# Patient Record
Sex: FEMALE | Race: WHITE | NOT HISPANIC OR LATINO | Employment: OTHER | URBAN - METROPOLITAN AREA
[De-identification: names, ages, dates, MRNs, and addresses within clinical notes are randomized per-mention and may not be internally consistent; named-entity substitution may affect disease eponyms.]

---

## 2017-01-31 ENCOUNTER — OFFICE VISIT (OUTPATIENT)
Dept: MEDICAL GROUP | Facility: PHYSICIAN GROUP | Age: 82
End: 2017-01-31
Payer: COMMERCIAL

## 2017-01-31 VITALS
SYSTOLIC BLOOD PRESSURE: 122 MMHG | RESPIRATION RATE: 16 BRPM | DIASTOLIC BLOOD PRESSURE: 80 MMHG | OXYGEN SATURATION: 91 % | WEIGHT: 110 LBS | HEIGHT: 62 IN | BODY MASS INDEX: 20.24 KG/M2 | TEMPERATURE: 98.1 F | HEART RATE: 61 BPM

## 2017-01-31 DIAGNOSIS — F41.0 PANIC ATTACKS: ICD-10-CM

## 2017-01-31 DIAGNOSIS — M62.830 MUSCLE SPASM OF BACK: ICD-10-CM

## 2017-01-31 DIAGNOSIS — F41.9 ANXIETY: ICD-10-CM

## 2017-01-31 DIAGNOSIS — M85.80 OSTEOPENIA: ICD-10-CM

## 2017-01-31 DIAGNOSIS — I10 ESSENTIAL HYPERTENSION, BENIGN: ICD-10-CM

## 2017-01-31 PROCEDURE — 3288F FALL RISK ASSESSMENT DOCD: CPT | Performed by: FAMILY MEDICINE

## 2017-01-31 PROCEDURE — 4040F PNEUMOC VAC/ADMIN/RCVD: CPT | Performed by: FAMILY MEDICINE

## 2017-01-31 PROCEDURE — 0518F FALL PLAN OF CARE DOCD: CPT | Mod: 8P | Performed by: FAMILY MEDICINE

## 2017-01-31 PROCEDURE — G8482 FLU IMMUNIZE ORDER/ADMIN: HCPCS | Performed by: FAMILY MEDICINE

## 2017-01-31 PROCEDURE — G8419 CALC BMI OUT NRM PARAM NOF/U: HCPCS | Performed by: FAMILY MEDICINE

## 2017-01-31 PROCEDURE — 1036F TOBACCO NON-USER: CPT | Performed by: FAMILY MEDICINE

## 2017-01-31 PROCEDURE — G8432 DEP SCR NOT DOC, RNG: HCPCS | Performed by: FAMILY MEDICINE

## 2017-01-31 PROCEDURE — 99214 OFFICE O/P EST MOD 30 MIN: CPT | Performed by: FAMILY MEDICINE

## 2017-01-31 PROCEDURE — 1100F PTFALLS ASSESS-DOCD GE2>/YR: CPT | Performed by: FAMILY MEDICINE

## 2017-01-31 RX ORDER — PAROXETINE HYDROCHLORIDE 20 MG/1
TABLET, FILM COATED ORAL
Qty: 90 TAB | Refills: 3 | Status: SHIPPED | OUTPATIENT
Start: 2017-01-31 | End: 2017-08-23 | Stop reason: SDUPTHER

## 2017-01-31 RX ORDER — ALENDRONATE SODIUM 70 MG/1
TABLET ORAL
Qty: 12 TAB | Refills: 3 | Status: SHIPPED | OUTPATIENT
Start: 2017-01-31 | End: 2017-12-19 | Stop reason: SDUPTHER

## 2017-01-31 NOTE — MR AVS SNAPSHOT
"        Marifer Eldridge   2017 10:40 AM   Office Visit   MRN: [id number]    Department:  [facility]   Dept Phone:  [phone]    Description:  Female : 1931   Provider:  Angelito Thomas M.D.           Reason for Visit     Hypertension 3 month follow up      Allergies as of 2017     Allergen Noted Reactions    Chocolate 2014       Diarrhea    Metrogel 2012       Headaches    Tessalon [Benzonatate] 2009       Headache      Zoloft 2009       headache      You were diagnosed with     Osteopenia   [867629]       Panic attacks   [258466]       Anxiety   [037508]       Essential hypertension, benign   [401.1.ICD-9-CM]       Muscle spasm of back   [648398]         Vital Signs     Blood Pressure Pulse Temperature Respirations Height Weight    122/80 mmHg 61 36.7 °C (98.1 °F) 16 1.575 m (5' 2\") 49.896 kg (110 lb)    Body Mass Index Oxygen Saturation Last Menstrual Period Smoking Status          20.11 kg/m2 91% 1986 Never Smoker         Basic Information     Date Of Birth Sex Race Ethnicity Preferred Language    1931 Female White Non- English      Problem List              ICD-10-CM Priority Class Noted - Resolved    Anxiety F41.9   2009 - Present    Essential hypertension, benign I10   2009 - Present    Diarrhea following gastrointestinal surgery R19.7, Z98.890   2009 - Present    Reactive airway disease J45.909   2009 - Present    Panic attacks F41.0   2009 - Present    Systolic murmur R01.1   2009 - Present    Hemorrhoid K64.9   2009 - Present    Vitamin D deficiency disease E55.9   2010 - Present    Osteopenia M85.80   2011 - Present    Acne rosacea L71.9   2012 - Present    Personal history of ovarian cancer Z85.43   2012 - Present    Peripheral neuropathy (CMS-HCC) G62.9   3/12/2013 - Present    Neural foraminal stenosis of cervical spine M99.81   4/10/2014 - Present    Overactive bladder N32.81  "  4/7/2016 - Present    Meningioma (HCC) D32.9   4/29/2016 - Present    Muscle spasm of back M62.830   10/25/2016 - Present      Health Maintenance        Date Due Completion Dates    MAMMOGRAM 11/18/2014 11/18/2013, 11/16/2012, 4/12/2011, 3/30/2010, 3/30/2010, 3/10/2009, 3/10/2009, 2/12/2008, 2/12/2008    BONE DENSITY 12/5/2018 12/5/2013, 11/23/2011    COLONOSCOPY 3/30/2020 3/30/2010 (Done)    Override on 3/30/2010: Done (normal)    IMM DTaP/Tdap/Td Vaccine (2 - Td) 7/17/2022 7/17/2012            Current Immunizations     13-VALENT PCV PREVNAR 10/25/2016    Influenza TIV (IM) 11/1/2012, 10/31/2011, 10/5/2010    Influenza Vaccine 10/1/2007    Influenza Vaccine Adult HD 10/25/2016    Influenza Vaccine Quad Inj (Pf) 10/6/2014    Influenza Vaccine Quad Inj (Preserved) 10/22/2015    Pneumococcal polysaccharide vaccine (PPSV-23) 12/13/2011    SHINGLES VACCINE 2/25/2010    Tdap Vaccine 7/17/2012    Tetanus Vaccine 9/1/2002      Below and/or attached are the medications your provider expects you to take. Review all of your home medications and newly ordered medications with your provider and/or pharmacist. Follow medication instructions as directed by your provider and/or pharmacist. Please keep your medication list with you and share with your provider. Update the information when medications are discontinued, doses are changed, or new medications (including over-the-counter products) are added; and carry medication information at all times in the event of emergency situations     Allergies:  CHOCOLATE - (reactions not documented)     METROGEL - (reactions not documented)     TESSALON - (reactions not documented)     ZOLOFT - (reactions not documented)               Medications  Valid as of: January 31, 2017 - 11:56 AM    Generic Name Brand Name Tablet Size Instructions for use    Alendronate Sodium (Tab) FOSAMAX 70 MG TAKE 1 TABLET EVERY 7 DAYS        Alendronate Sodium (Tab) FOSAMAX 70 MG TAKE 1 TABLET EVERY 7 DAYS           Aspirin (Tablet Delayed Response) ECOTRIN 325 MG Take 1 Tab by mouth every day.        B Complex-C   Take  by mouth.        Cholecalciferol (Tab) cholecalciferol 1000 UNIT Take 1 Tab by mouth every day.        Diphenoxylate-Atropine (Tab) LOMOTIL 2.5-0.025 MG TAKE  (1)  TABLET  FOUR TIMES DAILY AS NEEDED FOR DIARRHEA.        Hydrocodone-Acetaminophen (Tab) NORCO 5-325 MG Take 1-2 Tabs by mouth every four hours as needed.        Latanoprost (Solution) XALATAN 0.005 % Place 1 Drop in right eye every evening.        Metaxalone (Tab) Metaxalone 400 MG Take 400-800 mg by mouth 3 times a day as needed for Muscle Spasms.        Metaxalone (Tab) SKELAXIN 800 MG TAKE 1/2-1 TABLET BY MOUTH 3 TIMES A DAY AS NEEDED FOR MUSCLE SPASM.        Oxybutynin Chloride (Tab) DITROPAN 5 MG TAKE 1 TABLET BY MOUTH 4 TIMES DAILY AS NEEDED.        Oxybutynin Chloride (Tab) DITROPAN 5 MG TAKE 1 TABLET BY MOUTH 4 TIMES DAILY AS NEEDED.        PARoxetine HCl (Tab) PAXIL 20 MG TAKE 1 TABLET DAILY WITH   FOOD        Propranolol HCl (Tab) INDERAL 10 MG TAKE (1) TABLET BY MOUTH TWICE DAILY.        Triamterene-HCTZ (Cap) MAXZIDE-25/DYAZIDE 37.5-25 MG TAKE 1 CAPSULE EVERY       MORNING        .                 Medicines prescribed today were sent to:     [facility] [address] [address]    Phone: [phone] Fax: [phone]    Open 24 Hours?: No    [facility] [place], [place] - [address] AT PORTAL TO REGISTERED [facility]    [address] [facility] [address]    Phone: [phone] Fax: [phone]    Open 24 Hours?: No    [id number] - [place] - [address]    [address] [place] [address]    Phone: [phone] Fax: [phone]    Open 24 Hours?: No      Medication refill instructions:       If your prescription bottle indicates you have medication refills left, it is not necessary to call your provider’s office.  Please contact your pharmacy and they will refill your medication.    If your prescription bottle indicates you do not have any refills left, you may request refills at any time through one of the following ways: The online [employer] system (except Urgent Care), by calling your provider’s office, or by asking your pharmacy to contact your provider’s office with a refill request. Medication refills are processed only during regular business hours and may not be available until the next business day. Your provider may request additional information or to have a follow-up visit with you prior to refilling your medication.   *Please Note: Medication refills are assigned a new Rx number when refilled electronically. Your pharmacy may indicate that no refills were authorized even though a new prescription for the same medication is available at the pharmacy. Please request the medicine by name with the pharmacy before contacting your provider for a refill.           [employer] Access Code: [email or web address]  Expires: 3/2/2017 11:56 AM    [employer]  A secure, online tool to manage your health information     [employer]’s [employer]® is a secure, online tool that connects you to your personalized health information from the privacy of your home -- day or night - making it very easy for you to manage your healthcare. Once the activation process is completed, you can even access your medical information using the [employer] denny, which is available for free in the Apple Denny store or Google Play store.     [employer] provides the following levels of access (as shown below):   My Chart Features   [facility] Doctor [facility]  Specialists [facility]  Urgent  Care Non-[facility]  Primary Care  Doctor   Email your healthcare team securely and privately 24/7 X X X    Manage appointments: schedule your next appointment; view details of past/upcoming appointments X      Request prescription refills. X      View recent personal medical records,  including lab and immunizations X X X X   View health record, including health history, allergies, medications X X X X   Read reports about your outpatient visits, procedures, consult and ER notes X X X X   See your discharge summary, which is a recap of your hospital and/or ER visit that includes your diagnosis, lab results, and care plan. X X       How to register for [employer]:  1. Go to  [email or web address].  2. Click on the Sign Up Now box, which takes you to the New Member Sign Up page. You will need to provide the following information:  a. Enter your [employer] Access Code exactly as it appears at the top of this page. (You will not need to use this code after you’ve completed the sign-up process. If you do not sign up before the expiration date, you must request a new code.)   b. Enter your date of birth.   c. Enter your home email address.   d. Click Submit, and follow the next screen’s instructions.  3. Create a [employer] ID. This will be your [employer] login ID and cannot be changed, so think of one that is secure and easy to remember.  4. Create a [employer] password. You can change your password at any time.  5. Enter your Password Reset Question and Answer. This can be used at a later time if you forget your password.   6. Enter your e-mail address. This allows you to receive e-mail notifications when new information is available in [employer].  7. Click Sign Up. You can now view your health information.    For assistance activating your [employer] account, call [phone]

## 2017-02-01 NOTE — PROGRESS NOTES
Patient comes in. Her weight is stable. She still having problems with her back. She is going to be seeing Dr. Dyer her pain specialist tomorrow about this. She needs refills of some of her medications. She has no chest pains and no shortness of breath.    I reviewed the following    Past Medical History   Diagnosis Date   • Essential hypertension, benign    • Symptomatic menopausal or female climacteric states    • Generalized anxiety disorder    • Unspecified pleural effusion    • Panic attacks    • Pain      low back pain   • Dental disorder      upper partial   • Unspecified urinary incontinence    • Arthritis      back   • Cancer (CMS-HCC) 2004     ovarian   • Reactive airway disease      uses no inhalers   • Unspecified cataract      bilateral IOL   • Heart murmur      systolic   • Other specified disorder of intestines      diarrhea from some foods   • Overactive bladder         Past Surgical History   Procedure Laterality Date   • Colonoscopy  3/2010     Normal    • Angiogram  02/23/2007     cardiac  wnl   • Thoracotomy  01/2008     pleurodesis   • Colonoscopy  1999, 2005     both ok    • Abdominal hysterectomy total       still has 1 ovary   • Oophorectomy  5/2008        Dr Dee   • Lumbar laminectomy diskectomy  1/27/2015     Performed by Maxwell Lei M.D. at SURGERY [facility] ORS   • Abdominal exploration         Allergies   Allergen Reactions   • Chocolate      Diarrhea   • Metrogel      Headaches   • Tessalon [Benzonatate]      Headache     • Zoloft      headache       Current Outpatient Prescriptions   Medication Sig Dispense Refill   • alendronate (FOSAMAX) 70 MG Tab TAKE 1 TABLET EVERY 7 DAYS 12 Tab 3   • paroxetine (PAXIL) 20 MG Tab TAKE 1 TABLET DAILY WITH   FOOD 90 Tab 3   • metaxalone (SKELAXIN) 800 MG Tab TAKE 1/2-1 TABLET BY MOUTH 3 TIMES A DAY AS NEEDED FOR MUSCLE SPASM. 30 Tab 0   • diphenoxylate-atropine (LOMOTIL) 2.5-0.025 MG Tab TAKE  (1)  TABLET  FOUR TIMES DAILY AS NEEDED FOR  DIARRHEA. 90 Tab 1   • oxybutynin (DITROPAN) 5 MG Tab TAKE 1 TABLET BY MOUTH 4 TIMES DAILY AS NEEDED. 120 Tab 3   • alendronate (FOSAMAX) 70 MG Tab TAKE 1 TABLET EVERY 7 DAYS 12 Tab 3   • metaxalone 400 MG Tab Take 400-800 mg by mouth 3 times a day as needed for Muscle Spasms. 60 Tab 2   • triamterene/hctz (MAXZIDE-25/DYAZIDE) 37.5-25 MG Cap TAKE 1 CAPSULE EVERY       MORNING 90 Cap 3   • hydrocodone-acetaminophen (NORCO) 5-325 MG Tab per tablet Take 1-2 Tabs by mouth every four hours as needed. 60 Tab 0   • oxybutynin (DITROPAN) 5 MG Tab TAKE 1 TABLET BY MOUTH 4 TIMES DAILY AS NEEDED. 120 Tab 3   • aspirin EC (ECOTRIN) 325 MG Tablet Delayed Response Take 1 Tab by mouth every day. 90 Tab 3   • propranolol (INDERAL) 10 MG Tab TAKE (1) TABLET BY MOUTH TWICE DAILY. 180 Tab 3   • latanoprost (XALATAN) 0.005 % SOLN Place 1 Drop in right eye every evening.     • vitamin D (CHOLECALCIFEROL) 1000 UNIT TABS Take 1 Tab by mouth every day. 30 Tab 3   • SUPER B COMPLEX/C PO Take  by mouth.       No current facility-administered medications for this visit.        History reviewed. No pertinent family history.    Social History     Social History   • Marital Status:      Spouse Name: N/A   • Number of Children: N/A   • Years of Education: N/A     Occupational History   • Not on file.     Social History Main Topics   • Smoking status: Never Smoker    • Smokeless tobacco: Never Used   • Alcohol Use: No   • Drug Use: No   • Sexual Activity: No     Other Topics Concern   • Not on file     Social History Narrative          Physical Exam   Constitutional: She is oriented. She appears well-developed and well-nourished. No distress.   HENT:  Head: Normocephalic and atraumatic.   Right Ear: External ear normal. Ear canal and TM normal   Left Ear: External ear normal. Ear canal and TM normal  Nose: Nose normal.   Mouth/Throat: Oropharynx is clear and moist.   Eyes: Conjunctivae and extraocular motions are normal. Pupils are equal,  round, and reactive to light. Fundi benign bilaterally   Neck: No thyromegaly present.   Cardiovascular: Normal rate, regular rhythm, normal heart sounds and intact distal pulses.  Exam reveals no gallop.    No murmur heard.  Pulmonary/Chest: Effort normal and breath sounds normal. No respiratory distress. She has no wheezes. She has no rales.   Abdominal: Soft. Bowel sounds are normal. No hepatosplenomegaly She exhibits no distension. No tenderness. She has no rebound and no guarding.   Musculoskeletal: Normal range of motion. She exhibits no edema back is 2+ tender over the L1-3 paraspinous muscles bilaterally.  Lymphadenopathy:     She has no cervical adenopathy.   No supraclavicular adenopathy  Neurological: She is alert and oriented. She has normal reflexes.        Babinskis downgoing bilaterally   Skin: Skin is warm and dry. No rash noted. No erythema.   Psychiatric: She has a normal mood and appropriate affect. Her behavior is normal. Judgment and thought content normal.     1. Osteopenia  alendronate (FOSAMAX) 70 MG Tab--refill    2. Panic attacks--controlled with paroxetine  paroxetine (PAXIL) 20 MG Tab--refill    3. Anxiety   continue paroxetine    4. Essential hypertension, benign   controlled    5. Muscle spasm of back   continue to see Dr. Dyer      Recheck 6 months or when necessary    Please note that this dictation was created using voice recognition software. I have worked with consultants from the vendor as well as technical experts from [facility] to optimize the interface. I have made every reasonable attempt to correct obvious errors, but I expect that there are errors of grammar and possibly content that I did not discover before finalizing the note.

## 2017-02-01 NOTE — PATIENT INSTRUCTIONS
Patient given written instructions regarding labs, imaging, medications, referrals, dietary and lifestyle management, and return visit.    Angelito Thomas MD

## 2017-02-09 ENCOUNTER — APPOINTMENT (OUTPATIENT)
Dept: RADIOLOGY | Facility: MEDICAL CENTER | Age: 82
End: 2017-02-09
Attending: PHYSICAL MEDICINE & REHABILITATION
Payer: COMMERCIAL

## 2017-03-10 RX ORDER — METAXALONE 800 MG/1
TABLET ORAL
Qty: 30 TAB | Refills: 2 | Status: SHIPPED | OUTPATIENT
Start: 2017-03-10 | End: 2017-06-15 | Stop reason: SDUPTHER

## 2017-08-10 RX ORDER — OXYBUTYNIN CHLORIDE 5 MG/1
5 TABLET ORAL 4 TIMES DAILY PRN
Qty: 120 TAB | Refills: 6 | Status: SHIPPED | OUTPATIENT
Start: 2017-08-10 | End: 2017-10-10

## 2017-08-23 ENCOUNTER — OFFICE VISIT (OUTPATIENT)
Dept: MEDICAL GROUP | Facility: PHYSICIAN GROUP | Age: 82
End: 2017-08-23
Payer: COMMERCIAL

## 2017-08-23 ENCOUNTER — HOSPITAL ENCOUNTER (OUTPATIENT)
Dept: LAB | Facility: MEDICAL CENTER | Age: 82
End: 2017-08-23
Attending: FAMILY MEDICINE
Payer: COMMERCIAL

## 2017-08-23 VITALS
WEIGHT: 106 LBS | RESPIRATION RATE: 16 BRPM | BODY MASS INDEX: 19.51 KG/M2 | DIASTOLIC BLOOD PRESSURE: 62 MMHG | HEIGHT: 62 IN | TEMPERATURE: 97 F | SYSTOLIC BLOOD PRESSURE: 130 MMHG | HEART RATE: 66 BPM | OXYGEN SATURATION: 92 %

## 2017-08-23 DIAGNOSIS — R19.7 DIARRHEA FOLLOWING GASTROINTESTINAL SURGERY: ICD-10-CM

## 2017-08-23 DIAGNOSIS — Z98.890 DIARRHEA FOLLOWING GASTROINTESTINAL SURGERY: ICD-10-CM

## 2017-08-23 DIAGNOSIS — Z85.43 PERSONAL HISTORY OF OVARIAN CANCER: ICD-10-CM

## 2017-08-23 DIAGNOSIS — M48.02 NEURAL FORAMINAL STENOSIS OF CERVICAL SPINE: ICD-10-CM

## 2017-08-23 DIAGNOSIS — I10 ESSENTIAL HYPERTENSION: ICD-10-CM

## 2017-08-23 DIAGNOSIS — R53.83 FATIGUE, UNSPECIFIED TYPE: ICD-10-CM

## 2017-08-23 DIAGNOSIS — R63.4 UNINTENTIONAL WEIGHT LOSS: ICD-10-CM

## 2017-08-23 DIAGNOSIS — F41.0 PANIC ATTACKS: ICD-10-CM

## 2017-08-23 LAB
BASOPHILS # BLD AUTO: 0.4 % (ref 0–1.8)
BASOPHILS # BLD: 0.02 K/UL (ref 0–0.12)
BUN SERPL-MCNC: 23 MG/DL (ref 8–22)
CANCER AG125 SERPL-ACNC: 9.8 U/ML (ref 0–35)
CREAT SERPL-MCNC: 0.78 MG/DL (ref 0.5–1.4)
EOSINOPHIL # BLD AUTO: 0.17 K/UL (ref 0–0.51)
EOSINOPHIL NFR BLD: 3.5 % (ref 0–6.9)
ERYTHROCYTE [DISTWIDTH] IN BLOOD BY AUTOMATED COUNT: 44.3 FL (ref 35.9–50)
GFR SERPL CREATININE-BSD FRML MDRD: >60 ML/MIN/1.73 M 2
HCT VFR BLD AUTO: 41.7 % (ref 37–47)
HGB BLD-MCNC: 13.5 G/DL (ref 12–16)
IMM GRANULOCYTES # BLD AUTO: 0.01 K/UL (ref 0–0.11)
IMM GRANULOCYTES NFR BLD AUTO: 0.2 % (ref 0–0.9)
LYMPHOCYTES # BLD AUTO: 1.58 K/UL (ref 1–4.8)
LYMPHOCYTES NFR BLD: 32.9 % (ref 22–41)
MCH RBC QN AUTO: 33.1 PG (ref 27–33)
MCHC RBC AUTO-ENTMCNC: 32.4 G/DL (ref 33.6–35)
MCV RBC AUTO: 102.2 FL (ref 81.4–97.8)
MONOCYTES # BLD AUTO: 0.37 K/UL (ref 0–0.85)
MONOCYTES NFR BLD AUTO: 7.7 % (ref 0–13.4)
NEUTROPHILS # BLD AUTO: 2.65 K/UL (ref 2–7.15)
NEUTROPHILS NFR BLD: 55.3 % (ref 44–72)
NRBC # BLD AUTO: 0 K/UL
NRBC BLD AUTO-RTO: 0 /100 WBC
PLATELET # BLD AUTO: 226 K/UL (ref 164–446)
PMV BLD AUTO: 10 FL (ref 9–12.9)
RBC # BLD AUTO: 4.08 M/UL (ref 4.2–5.4)
T4 FREE SERPL-MCNC: 0.8 NG/DL (ref 0.53–1.43)
TSH SERPL DL<=0.005 MIU/L-ACNC: 1.03 UIU/ML (ref 0.3–3.7)
WBC # BLD AUTO: 4.8 K/UL (ref 4.8–10.8)

## 2017-08-23 PROCEDURE — 99214 OFFICE O/P EST MOD 30 MIN: CPT | Performed by: FAMILY MEDICINE

## 2017-08-23 PROCEDURE — 84439 ASSAY OF FREE THYROXINE: CPT

## 2017-08-23 PROCEDURE — 82565 ASSAY OF CREATININE: CPT

## 2017-08-23 PROCEDURE — 85025 COMPLETE CBC W/AUTO DIFF WBC: CPT

## 2017-08-23 PROCEDURE — 86304 IMMUNOASSAY TUMOR CA 125: CPT

## 2017-08-23 PROCEDURE — 84520 ASSAY OF UREA NITROGEN: CPT

## 2017-08-23 PROCEDURE — 36415 COLL VENOUS BLD VENIPUNCTURE: CPT

## 2017-08-23 PROCEDURE — 84443 ASSAY THYROID STIM HORMONE: CPT

## 2017-08-23 RX ORDER — DIPHENOXYLATE HYDROCHLORIDE AND ATROPINE SULFATE 2.5; .025 MG/1; MG/1
TABLET ORAL
Qty: 90 TAB | Refills: 1 | Status: SHIPPED | OUTPATIENT
Start: 2017-08-23 | End: 2019-08-16

## 2017-08-23 RX ORDER — PAROXETINE HYDROCHLORIDE 20 MG/1
TABLET, FILM COATED ORAL
Qty: 90 TAB | Refills: 3 | Status: SHIPPED | OUTPATIENT
Start: 2017-08-23 | End: 2018-09-18 | Stop reason: SDUPTHER

## 2017-08-23 RX ORDER — TRIAMTERENE AND HYDROCHLOROTHIAZIDE 37.5; 25 MG/1; MG/1
CAPSULE ORAL
Qty: 90 CAP | Refills: 3 | Status: SHIPPED | OUTPATIENT
Start: 2017-08-23 | End: 2018-10-22 | Stop reason: SDUPTHER

## 2017-08-23 RX ORDER — METAXALONE 800 MG/1
800 TABLET ORAL 3 TIMES DAILY
Qty: 90 TAB | Refills: 2 | Status: SHIPPED | OUTPATIENT
Start: 2017-08-23 | End: 2017-10-10

## 2017-08-23 ASSESSMENT — PATIENT HEALTH QUESTIONNAIRE - PHQ9
CLINICAL INTERPRETATION OF PHQ2 SCORE: 2
SUM OF ALL RESPONSES TO PHQ QUESTIONS 1-9: 7
5. POOR APPETITE OR OVEREATING: 1 - SEVERAL DAYS

## 2017-08-23 NOTE — PROGRESS NOTES
Patient comes in with several issues. She is tired. She's lost 4 more pounds. She is worried that her ovarian cancer may have returned. She does not have any pelvic fullness or vaginal bleeding.  She needs refills of medications.  She has ongoing issues with back pain. She is thinking of going to a chiropractor. I recommended Dr. Ceja in [facility] who does a good job.    I reviewed the following    Past Medical History   Diagnosis Date   • Essential hypertension, benign    • Symptomatic menopausal or female climacteric states    • Generalized anxiety disorder    • Unspecified pleural effusion    • Panic attacks    • Pain      low back pain   • Dental disorder      upper partial   • Unspecified urinary incontinence    • Arthritis      back   • Cancer (CMS-HCC) 2004     ovarian   • Reactive airway disease      uses no inhalers   • Unspecified cataract      bilateral IOL   • Heart murmur      systolic   • Other specified disorder of intestines      diarrhea from some foods   • Overactive bladder         Past Surgical History   Procedure Laterality Date   • Colonoscopy  3/2010     Normal    • Angiogram  02/23/2007     cardiac  wnl   • Thoracotomy  01/2008     pleurodesis   • Colonoscopy  1999, 2005     both ok    • Abdominal hysterectomy total       still has 1 ovary   • Oophorectomy  5/2008        Dr Dee   • Lumbar laminectomy diskectomy  1/27/2015     Performed by Maxwell Lei M.D. at SURGERY [facility] ORS   • Abdominal exploration         Allergies   Allergen Reactions   • Chocolate      Diarrhea   • Metrogel      Headaches   • Tessalon [Benzonatate]      Headache     • Zoloft      headache       Current Outpatient Prescriptions   Medication Sig Dispense Refill   • metaxalone (SKELAXIN) 800 MG Tab Take 1 Tab by mouth 3 times a day. 90 Tab 2   • triamterene/hctz (MAXZIDE-25/DYAZIDE) 37.5-25 MG Cap TAKE 1 CAPSULE EVERY       MORNING 90 Cap 3   • paroxetine (PAXIL) 20 MG Tab TAKE 1 TABLET DAILY WITH    FOOD 90 Tab 3   • diphenoxylate-atropine (LOMOTIL) 2.5-0.025 MG Tab TAKE  (1)  TABLET  FOUR TIMES DAILY AS NEEDED FOR DIARRHEA. 90 Tab 1   • alendronate (FOSAMAX) 70 MG Tab TAKE 1 TABLET EVERY 7 DAYS 12 Tab 3   • oxybutynin (DITROPAN) 5 MG Tab TAKE 1 TABLET BY MOUTH 4 TIMES DAILY AS NEEDED. 120 Tab 3   • aspirin EC (ECOTRIN) 325 MG Tablet Delayed Response Take 1 Tab by mouth every day. 90 Tab 3   • latanoprost (XALATAN) 0.005 % SOLN Place 1 Drop in right eye every evening.     • vitamin D (CHOLECALCIFEROL) 1000 UNIT TABS Take 1 Tab by mouth every day. 30 Tab 3   • SUPER B COMPLEX/C PO Take  by mouth.     • oxybutynin (DITROPAN) 5 MG Tab Take 1 Tab by mouth 4 times a day as needed. 120 Tab 6   • propranolol (INDERAL) 10 MG Tab TAKE (1) TABLET BY MOUTH TWICE DAILY. 180 Tab 3   • PROCTOSOL HC 2.5 % Cream APPLY TO ANAL AREA 2 TIMES A DAY. 28.35 g 1   • alendronate (FOSAMAX) 70 MG Tab TAKE 1 TABLET EVERY 7 DAYS 12 Tab 3   • metaxalone 400 MG Tab Take 400-800 mg by mouth 3 times a day as needed for Muscle Spasms. 60 Tab 2   • hydrocodone-acetaminophen (NORCO) 5-325 MG Tab per tablet Take 1-2 Tabs by mouth every four hours as needed. (Patient not taking: Reported on 8/23/2017) 60 Tab 0     No current facility-administered medications for this visit.        History reviewed. No pertinent family history.    Social History     Social History   • Marital Status:      Spouse Name: N/A   • Number of Children: N/A   • Years of Education: N/A     Occupational History   • Not on file.     Social History Main Topics   • Smoking status: Never Smoker    • Smokeless tobacco: Never Used   • Alcohol Use: No   • Drug Use: No   • Sexual Activity: No     Other Topics Concern   • Not on file     Social History Narrative          Physical Exam   Constitutional: She is oriented. She appears well-developed and thin. No distress.   HENT:  Head: Normocephalic and atraumatic.   Right Ear: External ear normal. Ear canal and TM normal   Left  Ear: External ear normal. Ear canal and TM normal  Nose: Nose normal.   Mouth/Throat: Oropharynx is clear and moist.   Eyes: Conjunctivae and extraocular motions are normal. Pupils are equal, round, and reactive to light. Fundi benign bilaterally   Neck: No thyromegaly present.   Cardiovascular: Normal rate, regular rhythm, normal heart sounds and intact distal pulses.  Exam reveals no gallop.    No murmur heard.  Pulmonary/Chest: Effort normal and breath sounds normal. No respiratory distress. She has no wheezes. She has no rales.   Abdominal: Soft. Bowel sounds are normal. No hepatosplenomegaly She exhibits no distension. No tenderness. She has no rebound and no guarding.   Musculoskeletal: Normal range of motion. She exhibits no edema and no tenderness.   Lymphadenopathy:     She has no cervical adenopathy.   No supraclavicular adenopathy  Neurological: She is alert and oriented. She has normal reflexes.        Babinskis downgoing bilaterally   Skin: Skin is warm and dry. No rash noted. No erythema.   Psychiatric: She has a normal mood and appropriate affect. Her behavior is normal. Judgment and thought content normal.     1. Unintentional weight loss--Etiology not clear  BUN    CREATININE    CBC WITH DIFFERENTIAL    CANCER ANTIGEN 125    CT-CHEST,ABDOMEN,PELVIS WITH   2. Fatigue, unspecified type --etiology  BUN    CREATININE    CBC WITH DIFFERENTIAL    TSH    FREE THYROXINE   3. Personal history of ovarian cancer--needs reassessment  BUN    CREATININE    CANCER ANTIGEN 125    CT-CHEST,ABDOMEN,PELVIS WITH   4. Neural foraminal stenosis of cervical spine  metaxalone (SKELAXIN) 800 MG Tab  Patient will also try Dr. Ceja in [facility] who is a good chiropractor    5. Essential hypertension  triamterene/hctz (MAXZIDE-25/DYAZIDE) 37.5-25 MG Cap--Refill    6. Panic attacks  paroxetine (PAXIL) 20 MG Tab--Refill    7. Diarrhea following gastrointestinal surgery  diphenoxylate-atropine (LOMOTIL) 2.5-0.025 MG  Tab--Refill      Recheck when necessary above results    Please note that this dictation was created using voice recognition software. I have worked with consultants from the vendor as well as technical experts from [facility] to optimize the interface. I have made every reasonable attempt to correct obvious errors, but I expect that there are errors of grammar and possibly content that I did not discover before finalizing the note.

## 2017-08-23 NOTE — MR AVS SNAPSHOT
"        Marifer Eldridge   2017 10:20 AM   Office Visit   MRN: [id number]    Department:  [facility]   Dept Phone:  [phone]    Description:  Female : 1931   Provider:  Angelito Thomas M.D.           Reason for Visit     Fatigue           Allergies as of 2017     Allergen Noted Reactions    Chocolate 2014       Diarrhea    Metrogel 2012       Headaches    Tessalon [Benzonatate] 2009       Headache      Zoloft 2009       headache      You were diagnosed with     Unintentional weight loss   [175452]       Fatigue, unspecified type   [1308743]       Personal history of ovarian cancer   [354372]       Neural foraminal stenosis of cervical spine   [394540]       Essential hypertension   [2610621]       Panic attacks   [887531]       Diarrhea following gastrointestinal surgery   [481719]         Vital Signs     Blood Pressure Pulse Temperature Respirations Height Weight    130/62 mmHg 66 36.1 °C (97 °F) 16 1.575 m (5' 2\") 48.081 kg (106 lb)    Body Mass Index Oxygen Saturation Last Menstrual Period Smoking Status          19.38 kg/m2 92% 1986 Never Smoker         Basic Information     Date Of Birth Sex Race Ethnicity Preferred Language    1931 Female White Non- English      Problem List              ICD-10-CM Priority Class Noted - Resolved    Anxiety F41.9   2009 - Present    Essential hypertension, benign I10   2009 - Present    Diarrhea following gastrointestinal surgery R19.7, Z98.890   2009 - Present    Reactive airway disease J45.909   2009 - Present    Panic attacks F41.0   2009 - Present    Systolic murmur R01.1   2009 - Present    Hemorrhoid K64.9   2009 - Present    Vitamin D deficiency disease E55.9   2010 - Present    Osteopenia M85.80   2011 - Present    Acne rosacea L71.9   2012 - Present    Personal history of ovarian cancer Z85.43   2012 - Present    Peripheral neuropathy G62.9   " 3/12/2013 - Present    Neural foraminal stenosis of cervical spine M99.81   4/10/2014 - Present    Overactive bladder N32.81   4/7/2016 - Present    Meningioma (HCC) D32.9   4/29/2016 - Present    Muscle spasm of back M62.830   10/25/2016 - Present      Health Maintenance        Date Due Completion Dates    MAMMOGRAM 11/18/2014 11/18/2013, 11/16/2012, 4/12/2011, 3/30/2010, 3/30/2010, 3/10/2009, 3/10/2009, 2/12/2008, 2/12/2008    IMM INFLUENZA (1) 9/1/2017 10/25/2016, 10/22/2015, 10/6/2014, 11/1/2012, 10/31/2011, 10/5/2010    BONE DENSITY 12/5/2018 12/5/2013, 11/23/2011    COLONOSCOPY 3/30/2020 3/30/2010 (Done)    Override on 3/30/2010: Done (normal)    IMM DTaP/Tdap/Td Vaccine (2 - Td) 7/17/2022 7/17/2012            Current Immunizations     13-VALENT PCV PREVNAR 10/25/2016    Influenza TIV (IM) 11/1/2012, 10/31/2011, 10/5/2010    Influenza Vaccine 10/1/2007    Influenza Vaccine Adult HD 10/25/2016    Influenza Vaccine Quad Inj (Pf) 10/6/2014    Influenza Vaccine Quad Inj (Preserved) 10/22/2015    Pneumococcal polysaccharide vaccine (PPSV-23) 12/13/2011    SHINGLES VACCINE 2/25/2010    Tdap Vaccine 7/17/2012    Tetanus Vaccine 9/1/2002      Below and/or attached are the medications your provider expects you to take. Review all of your home medications and newly ordered medications with your provider and/or pharmacist. Follow medication instructions as directed by your provider and/or pharmacist. Please keep your medication list with you and share with your provider. Update the information when medications are discontinued, doses are changed, or new medications (including over-the-counter products) are added; and carry medication information at all times in the event of emergency situations     Allergies:  CHOCOLATE - (reactions not documented)     METROGEL - (reactions not documented)     TESSALON - (reactions not documented)     ZOLOFT - (reactions not documented)               Medications  Valid as of: August 23,  2017 - 11:06 AM    Generic Name Brand Name Tablet Size Instructions for use    Alendronate Sodium (Tab) FOSAMAX 70 MG TAKE 1 TABLET EVERY 7 DAYS        Alendronate Sodium (Tab) FOSAMAX 70 MG TAKE 1 TABLET EVERY 7 DAYS        Aspirin (Tablet Delayed Response) ECOTRIN 325 MG Take 1 Tab by mouth every day.        B Complex-C   Take  by mouth.        Cholecalciferol (Tab) cholecalciferol 1000 UNIT Take 1 Tab by mouth every day.        Diphenoxylate-Atropine (Tab) LOMOTIL 2.5-0.025 MG TAKE  (1)  TABLET  FOUR TIMES DAILY AS NEEDED FOR DIARRHEA.        Hydrocodone-Acetaminophen (Tab) NORCO 5-325 MG Take 1-2 Tabs by mouth every four hours as needed.        Hydrocortisone (Cream) PROCTOSOL HC 2.5 % APPLY TO ANAL AREA 2 TIMES A DAY.        Latanoprost (Solution) XALATAN 0.005 % Place 1 Drop in right eye every evening.        Metaxalone (Tab) Metaxalone 400 MG Take 400-800 mg by mouth 3 times a day as needed for Muscle Spasms.        Metaxalone (Tab) SKELAXIN 800 MG Take 1 Tab by mouth 3 times a day.        Oxybutynin Chloride (Tab) DITROPAN 5 MG TAKE 1 TABLET BY MOUTH 4 TIMES DAILY AS NEEDED.        Oxybutynin Chloride (Tab) DITROPAN 5 MG Take 1 Tab by mouth 4 times a day as needed.        PARoxetine HCl (Tab) PAXIL 20 MG TAKE 1 TABLET DAILY WITH   FOOD        Propranolol HCl (Tab) INDERAL 10 MG TAKE (1) TABLET BY MOUTH TWICE DAILY.        Triamterene-HCTZ (Cap) MAXZIDE-25/DYAZIDE 37.5-25 MG TAKE 1 CAPSULE EVERY       MORNING        .                 Medicines prescribed today were sent to:     [facility] - [place], [place] - [address]    [address] [facility] [address]    Phone: [phone] Fax: [phone]    Open 24 Hours?: No    [facility] - [place], [place] - [address] AT PORTAL TO REGISTERED [facility] SITES    [address] [facility] [address]    Phone: [phone] Fax: [phone]    Open 24 Hours?: No    [id number] - [place], [place] - [address] [facility]    [address] [facility] [place] [address]    Phone: [phone] Fax: [phone]    Open 24 Hours?: No      Medication refill instructions:       If your prescription bottle indicates you have medication refills left, it is not necessary to call your provider’s office. Please contact your pharmacy and they will refill your medication.    If your prescription bottle indicates you do not have any refills left, you may request refills at any time through one of the following ways: The online [facility] system (except Urgent Care), by calling your provider’s office, or by asking your pharmacy to contact your provider’s office with a refill request. Medication refills are processed only during regular business hours and may not be available until the next business day. Your provider may request additional information or to have a follow-up visit with you prior to refilling your medication.   *Please Note: Medication refills are assigned a new Rx number when refilled electronically. Your pharmacy may indicate that no refills were authorized even though a new prescription for the same medication is available at the pharmacy. Please request the medicine by name with the pharmacy before contacting your provider for a refill.        Your To Do List     Future Labs/Procedures Complete By Expires    BUN  As directed 8/24/2018    CANCER ANTIGEN 125  As directed 8/23/2018    CBC WITH DIFFERENTIAL  As directed 8/24/2018    CREATININE  As directed 8/24/2018    CT-CHEST,ABDOMEN,PELVIS WITH  As directed 8/23/2018    FREE THYROXINE  As directed 8/24/2018    TSH  As directed 8/24/2018         [facility] Status: Patient Declined

## 2017-09-07 ENCOUNTER — HOSPITAL ENCOUNTER (OUTPATIENT)
Dept: RADIOLOGY | Facility: MEDICAL CENTER | Age: 82
End: 2017-09-07
Attending: FAMILY MEDICINE
Payer: COMMERCIAL

## 2017-09-07 DIAGNOSIS — R63.4 UNINTENTIONAL WEIGHT LOSS: ICD-10-CM

## 2017-09-07 DIAGNOSIS — Z85.43 PERSONAL HISTORY OF OVARIAN CANCER: ICD-10-CM

## 2017-09-07 PROCEDURE — 700117 HCHG RX CONTRAST REV CODE 255: Performed by: FAMILY MEDICINE

## 2017-09-07 PROCEDURE — 71260 CT THORAX DX C+: CPT

## 2017-09-07 RX ADMIN — IOHEXOL 100 ML: 350 INJECTION, SOLUTION INTRAVENOUS at 11:19

## 2017-09-07 RX ADMIN — IOHEXOL 50 ML: 240 INJECTION, SOLUTION INTRATHECAL; INTRAVASCULAR; INTRAVENOUS; ORAL at 11:19

## 2017-09-12 DIAGNOSIS — R59.0 HILAR ADENOPATHY: ICD-10-CM

## 2017-09-12 DIAGNOSIS — Z85.43 PERSONAL HISTORY OF OVARIAN CANCER: ICD-10-CM

## 2017-09-14 ENCOUNTER — TELEPHONE (OUTPATIENT)
Dept: MEDICAL GROUP | Facility: PHYSICIAN GROUP | Age: 82
End: 2017-09-14

## 2017-09-14 NOTE — TELEPHONE ENCOUNTER
1. Caller Name: Marifer Eldridge                                         Call Back Number: [phone] (home)       Patient approves a detailed voicemail message: N\A    Marifer called with concerns with her appointment with Dr. Vee, She was told he is on vacation and couldn't get in until 9/28/17. I called there office spoke with Alexsandra and Alexsandra told when her appointment was and Dr. Vee will be back on Monday. Marifer wanted me to get her in A.S.A.P. and didn't care who she saw. After finding out that Dr. Vee will be back on Monday. I called Marifer let her know the update on Dr. Vee.   Marifer also wanted to understand more of what was found. So I googled hilar lymphadenopathy and explained that this lymph Node is behind the lungs but she needs further testing and that would be done By Dr. Vee to determine if it is cancerous or not some times when an node is in large it could be irritated or she has an infection as well as possible mastitis of cancer but would not know until the Doctor does all of his testing to confirm what it is. Pt stated she understood and will wait until her visit. I also let her know that Dr. Vee office will be calling her about her appointment.

## 2017-10-10 ENCOUNTER — APPOINTMENT (OUTPATIENT)
Dept: RADIOLOGY | Facility: MEDICAL CENTER | Age: 82
End: 2017-10-10
Attending: INTERNAL MEDICINE
Payer: COMMERCIAL

## 2017-10-10 ENCOUNTER — APPOINTMENT (OUTPATIENT)
Dept: RADIOLOGY | Facility: MEDICAL CENTER | Age: 82
DRG: 871 | End: 2017-10-10
Payer: COMMERCIAL

## 2017-10-10 ENCOUNTER — HOSPITAL ENCOUNTER (INPATIENT)
Facility: MEDICAL CENTER | Age: 82
LOS: 2 days | DRG: 871 | End: 2017-10-12
Attending: EMERGENCY MEDICINE | Admitting: INTERNAL MEDICINE
Payer: COMMERCIAL

## 2017-10-10 ENCOUNTER — RESOLUTE PROFESSIONAL BILLING HOSPITAL PROF FEE (OUTPATIENT)
Dept: HOSPITALIST | Facility: MEDICAL CENTER | Age: 82
End: 2017-10-10
Payer: COMMERCIAL

## 2017-10-10 DIAGNOSIS — R53.1 WEAKNESS: ICD-10-CM

## 2017-10-10 DIAGNOSIS — R41.0 CONFUSION: ICD-10-CM

## 2017-10-10 DIAGNOSIS — R31.9 URINARY TRACT INFECTION WITH HEMATURIA, SITE UNSPECIFIED: ICD-10-CM

## 2017-10-10 DIAGNOSIS — E86.0 DEHYDRATION: ICD-10-CM

## 2017-10-10 DIAGNOSIS — N39.0 URINARY TRACT INFECTION WITH HEMATURIA, SITE UNSPECIFIED: ICD-10-CM

## 2017-10-10 PROBLEM — R41.82 ALTERED MENTAL STATUS: Status: ACTIVE | Noted: 2017-10-10

## 2017-10-10 PROBLEM — A41.9 SEPSIS (HCC): Status: ACTIVE | Noted: 2017-10-10

## 2017-10-10 PROBLEM — R79.89 ELEVATED TROPONIN: Status: ACTIVE | Noted: 2017-10-10

## 2017-10-10 LAB
ALBUMIN SERPL BCP-MCNC: 3.6 G/DL (ref 3.2–4.9)
ALBUMIN/GLOB SERPL: 1.5 G/DL
ALP SERPL-CCNC: 48 U/L (ref 30–99)
ALT SERPL-CCNC: 15 U/L (ref 2–50)
ANION GAP SERPL CALC-SCNC: 9 MMOL/L (ref 0–11.9)
APPEARANCE UR: CLEAR
APTT PPP: 25 SEC (ref 24.7–36)
AST SERPL-CCNC: 23 U/L (ref 12–45)
BASOPHILS # BLD AUTO: 0.1 % (ref 0–1.8)
BASOPHILS # BLD AUTO: 0.1 % (ref 0–1.8)
BASOPHILS # BLD: 0.01 K/UL (ref 0–0.12)
BASOPHILS # BLD: 0.01 K/UL (ref 0–0.12)
BILIRUB SERPL-MCNC: 1.3 MG/DL (ref 0.1–1.5)
BILIRUB UR QL STRIP.AUTO: NEGATIVE
BNP SERPL-MCNC: 65 PG/ML (ref 0–100)
BUN SERPL-MCNC: 23 MG/DL (ref 8–22)
CALCIUM SERPL-MCNC: 8.3 MG/DL (ref 8.5–10.5)
CHLORIDE SERPL-SCNC: 101 MMOL/L (ref 96–112)
CO2 SERPL-SCNC: 28 MMOL/L (ref 20–33)
COLOR UR: ABNORMAL
CREAT SERPL-MCNC: 0.61 MG/DL (ref 0.5–1.4)
EOSINOPHIL # BLD AUTO: 0 K/UL (ref 0–0.51)
EOSINOPHIL # BLD AUTO: 0 K/UL (ref 0–0.51)
EOSINOPHIL NFR BLD: 0 % (ref 0–6.9)
EOSINOPHIL NFR BLD: 0 % (ref 0–6.9)
ERYTHROCYTE [DISTWIDTH] IN BLOOD BY AUTOMATED COUNT: 43.4 FL (ref 35.9–50)
ERYTHROCYTE [DISTWIDTH] IN BLOOD BY AUTOMATED COUNT: 43.9 FL (ref 35.9–50)
EST. AVERAGE GLUCOSE BLD GHB EST-MCNC: 120 MG/DL
GFR SERPL CREATININE-BSD FRML MDRD: >60 ML/MIN/1.73 M 2
GLOBULIN SER CALC-MCNC: 2.4 G/DL (ref 1.9–3.5)
GLUCOSE SERPL-MCNC: 158 MG/DL (ref 65–99)
GLUCOSE UR STRIP.AUTO-MCNC: NEGATIVE MG/DL
HBA1C MFR BLD: 5.8 % (ref 0–5.6)
HCT VFR BLD AUTO: 33.6 % (ref 37–47)
HCT VFR BLD AUTO: 35.9 % (ref 37–47)
HGB BLD-MCNC: 11.3 G/DL (ref 12–16)
HGB BLD-MCNC: 12.3 G/DL (ref 12–16)
IMM GRANULOCYTES # BLD AUTO: 0.01 K/UL (ref 0–0.11)
IMM GRANULOCYTES # BLD AUTO: 0.02 K/UL (ref 0–0.11)
IMM GRANULOCYTES NFR BLD AUTO: 0.1 % (ref 0–0.9)
IMM GRANULOCYTES NFR BLD AUTO: 0.3 % (ref 0–0.9)
INR PPP: 1.08 (ref 0.87–1.13)
KETONES UR STRIP.AUTO-MCNC: ABNORMAL MG/DL
LACTATE BLD-SCNC: 1.7 MMOL/L (ref 0.5–2)
LACTATE BLD-SCNC: 2.2 MMOL/L (ref 0.5–2)
LACTATE BLD-SCNC: 2.5 MMOL/L (ref 0.5–2)
LEUKOCYTE ESTERASE UR QL STRIP.AUTO: NEGATIVE
LYMPHOCYTES # BLD AUTO: 0.47 K/UL (ref 1–4.8)
LYMPHOCYTES # BLD AUTO: 1.25 K/UL (ref 1–4.8)
LYMPHOCYTES NFR BLD: 13.5 % (ref 22–41)
LYMPHOCYTES NFR BLD: 6.1 % (ref 22–41)
MAGNESIUM SERPL-MCNC: 1.3 MG/DL (ref 1.5–2.5)
MCH RBC QN AUTO: 33.8 PG (ref 27–33)
MCH RBC QN AUTO: 34.2 PG (ref 27–33)
MCHC RBC AUTO-ENTMCNC: 33.6 G/DL (ref 33.6–35)
MCHC RBC AUTO-ENTMCNC: 34.3 G/DL (ref 33.6–35)
MCV RBC AUTO: 100.6 FL (ref 81.4–97.8)
MCV RBC AUTO: 99.7 FL (ref 81.4–97.8)
MICRO URNS: ABNORMAL
MONOCYTES # BLD AUTO: 0.28 K/UL (ref 0–0.85)
MONOCYTES # BLD AUTO: 0.85 K/UL (ref 0–0.85)
MONOCYTES NFR BLD AUTO: 3.7 % (ref 0–13.4)
MONOCYTES NFR BLD AUTO: 9.2 % (ref 0–13.4)
NEUTROPHILS # BLD AUTO: 6.89 K/UL (ref 2–7.15)
NEUTROPHILS # BLD AUTO: 7.11 K/UL (ref 2–7.15)
NEUTROPHILS NFR BLD: 77.1 % (ref 44–72)
NEUTROPHILS NFR BLD: 89.8 % (ref 44–72)
NITRITE UR QL STRIP.AUTO: NEGATIVE
NRBC # BLD AUTO: 0 K/UL
NRBC # BLD AUTO: 0 K/UL
NRBC BLD AUTO-RTO: 0 /100 WBC
NRBC BLD AUTO-RTO: 0 /100 WBC
PH UR STRIP.AUTO: 5 [PH]
PHOSPHATE SERPL-MCNC: 2.6 MG/DL (ref 2.5–4.5)
PLATELET # BLD AUTO: 180 K/UL (ref 164–446)
PLATELET # BLD AUTO: 183 K/UL (ref 164–446)
PMV BLD AUTO: 9.5 FL (ref 9–12.9)
PMV BLD AUTO: 9.7 FL (ref 9–12.9)
POTASSIUM SERPL-SCNC: 3 MMOL/L (ref 3.6–5.5)
PROT SERPL-MCNC: 6 G/DL (ref 6–8.2)
PROT UR QL STRIP: NEGATIVE MG/DL
PROTHROMBIN TIME: 14.3 SEC (ref 12–14.6)
RBC # BLD AUTO: 3.34 M/UL (ref 4.2–5.4)
RBC # BLD AUTO: 3.6 M/UL (ref 4.2–5.4)
RBC UR QL AUTO: NEGATIVE
SODIUM SERPL-SCNC: 138 MMOL/L (ref 135–145)
SP GR UR STRIP.AUTO: 1.02
TROPONIN I SERPL-MCNC: 0.2 NG/ML (ref 0–0.04)
TSH SERPL DL<=0.005 MIU/L-ACNC: 0.45 UIU/ML (ref 0.3–3.7)
UROBILINOGEN UR STRIP.AUTO-MCNC: NORMAL MG/DL
WBC # BLD AUTO: 7.7 K/UL (ref 4.8–10.8)
WBC # BLD AUTO: 9.2 K/UL (ref 4.8–10.8)

## 2017-10-10 PROCEDURE — 85730 THROMBOPLASTIN TIME PARTIAL: CPT

## 2017-10-10 PROCEDURE — 700101 HCHG RX REV CODE 250: Performed by: INTERNAL MEDICINE

## 2017-10-10 PROCEDURE — 84484 ASSAY OF TROPONIN QUANT: CPT

## 2017-10-10 PROCEDURE — 83036 HEMOGLOBIN GLYCOSYLATED A1C: CPT

## 2017-10-10 PROCEDURE — 700105 HCHG RX REV CODE 258: Performed by: EMERGENCY MEDICINE

## 2017-10-10 PROCEDURE — 84443 ASSAY THYROID STIM HORMONE: CPT

## 2017-10-10 PROCEDURE — 85025 COMPLETE CBC W/AUTO DIFF WBC: CPT | Mod: 91

## 2017-10-10 PROCEDURE — 87040 BLOOD CULTURE FOR BACTERIA: CPT

## 2017-10-10 PROCEDURE — 84100 ASSAY OF PHOSPHORUS: CPT

## 2017-10-10 PROCEDURE — 700111 HCHG RX REV CODE 636 W/ 250 OVERRIDE (IP): Performed by: INTERNAL MEDICINE

## 2017-10-10 PROCEDURE — 36415 COLL VENOUS BLD VENIPUNCTURE: CPT

## 2017-10-10 PROCEDURE — 99285 EMERGENCY DEPT VISIT HI MDM: CPT

## 2017-10-10 PROCEDURE — 96361 HYDRATE IV INFUSION ADD-ON: CPT

## 2017-10-10 PROCEDURE — 85610 PROTHROMBIN TIME: CPT

## 2017-10-10 PROCEDURE — 83605 ASSAY OF LACTIC ACID: CPT

## 2017-10-10 PROCEDURE — 96372 THER/PROPH/DIAG INJ SC/IM: CPT

## 2017-10-10 PROCEDURE — 87086 URINE CULTURE/COLONY COUNT: CPT

## 2017-10-10 PROCEDURE — 96365 THER/PROPH/DIAG IV INF INIT: CPT

## 2017-10-10 PROCEDURE — 99223 1ST HOSP IP/OBS HIGH 75: CPT | Performed by: INTERNAL MEDICINE

## 2017-10-10 PROCEDURE — 83735 ASSAY OF MAGNESIUM: CPT

## 2017-10-10 PROCEDURE — 770020 HCHG ROOM/CARE - TELE (206)

## 2017-10-10 PROCEDURE — 80053 COMPREHEN METABOLIC PANEL: CPT

## 2017-10-10 PROCEDURE — 81003 URINALYSIS AUTO W/O SCOPE: CPT

## 2017-10-10 PROCEDURE — 83880 ASSAY OF NATRIURETIC PEPTIDE: CPT

## 2017-10-10 PROCEDURE — 71010 DX-CHEST-PORTABLE (1 VIEW): CPT

## 2017-10-10 PROCEDURE — 80048 BASIC METABOLIC PNL TOTAL CA: CPT

## 2017-10-10 RX ORDER — SODIUM CHLORIDE AND POTASSIUM CHLORIDE 150; 900 MG/100ML; MG/100ML
INJECTION, SOLUTION INTRAVENOUS CONTINUOUS
Status: DISCONTINUED | OUTPATIENT
Start: 2017-10-10 | End: 2017-10-12 | Stop reason: HOSPADM

## 2017-10-10 RX ORDER — CEFTRIAXONE 1 G/1
1 INJECTION, POWDER, FOR SOLUTION INTRAMUSCULAR; INTRAVENOUS DAILY
Status: DISCONTINUED | OUTPATIENT
Start: 2017-10-10 | End: 2017-10-10

## 2017-10-10 RX ORDER — DEXTROSE MONOHYDRATE 25 G/50ML
25 INJECTION, SOLUTION INTRAVENOUS
Status: DISCONTINUED | OUTPATIENT
Start: 2017-10-10 | End: 2017-10-12 | Stop reason: HOSPADM

## 2017-10-10 RX ORDER — SODIUM CHLORIDE 9 MG/ML
1000 INJECTION, SOLUTION INTRAVENOUS
Status: COMPLETED | OUTPATIENT
Start: 2017-10-10 | End: 2017-10-11

## 2017-10-10 RX ORDER — HYDROCODONE BITARTRATE AND ACETAMINOPHEN 5; 325 MG/1; MG/1
1-2 TABLET ORAL EVERY 4 HOURS PRN
COMMUNITY
End: 2017-12-07

## 2017-10-10 RX ORDER — AMOXICILLIN 250 MG
2 CAPSULE ORAL 2 TIMES DAILY
Status: DISCONTINUED | OUTPATIENT
Start: 2017-10-10 | End: 2017-10-12

## 2017-10-10 RX ORDER — BISACODYL 10 MG
10 SUPPOSITORY, RECTAL RECTAL
Status: DISCONTINUED | OUTPATIENT
Start: 2017-10-10 | End: 2017-10-12 | Stop reason: HOSPADM

## 2017-10-10 RX ORDER — POLYETHYLENE GLYCOL 3350 17 G/17G
1 POWDER, FOR SOLUTION ORAL
Status: DISCONTINUED | OUTPATIENT
Start: 2017-10-10 | End: 2017-10-12 | Stop reason: HOSPADM

## 2017-10-10 RX ORDER — SODIUM CHLORIDE 9 MG/ML
30 INJECTION, SOLUTION INTRAVENOUS
Status: DISCONTINUED | OUTPATIENT
Start: 2017-10-10 | End: 2017-10-12 | Stop reason: HOSPADM

## 2017-10-10 RX ORDER — CEFTRIAXONE 1 G/1
1 INJECTION, POWDER, FOR SOLUTION INTRAMUSCULAR; INTRAVENOUS ONCE
Status: COMPLETED | OUTPATIENT
Start: 2017-10-10 | End: 2017-10-10

## 2017-10-10 RX ORDER — OXYBUTYNIN CHLORIDE 5 MG/1
5 TABLET ORAL 4 TIMES DAILY PRN
COMMUNITY
End: 2019-08-16

## 2017-10-10 RX ORDER — ACETAMINOPHEN 325 MG/1
650 TABLET ORAL EVERY 6 HOURS PRN
Status: DISCONTINUED | OUTPATIENT
Start: 2017-10-10 | End: 2017-10-12 | Stop reason: HOSPADM

## 2017-10-10 RX ORDER — ONDANSETRON 2 MG/ML
4 INJECTION INTRAMUSCULAR; INTRAVENOUS EVERY 4 HOURS PRN
Status: DISCONTINUED | OUTPATIENT
Start: 2017-10-10 | End: 2017-10-12 | Stop reason: HOSPADM

## 2017-10-10 RX ORDER — PROPRANOLOL HYDROCHLORIDE 10 MG/1
10 TABLET ORAL 2 TIMES DAILY
COMMUNITY
End: 2017-10-10

## 2017-10-10 RX ORDER — ONDANSETRON 4 MG/1
4 TABLET, ORALLY DISINTEGRATING ORAL EVERY 4 HOURS PRN
Status: DISCONTINUED | OUTPATIENT
Start: 2017-10-10 | End: 2017-10-12 | Stop reason: HOSPADM

## 2017-10-10 RX ORDER — SODIUM CHLORIDE 9 MG/ML
1000 INJECTION, SOLUTION INTRAVENOUS CONTINUOUS
Status: DISCONTINUED | OUTPATIENT
Start: 2017-10-10 | End: 2017-10-10

## 2017-10-10 RX ADMIN — POTASSIUM CHLORIDE AND SODIUM CHLORIDE: 900; 150 INJECTION, SOLUTION INTRAVENOUS at 20:07

## 2017-10-10 RX ADMIN — SODIUM CHLORIDE 1000 ML: 9 INJECTION, SOLUTION INTRAVENOUS at 16:02

## 2017-10-10 RX ADMIN — CEFTRIAXONE SODIUM 1 G: 1 INJECTION, POWDER, FOR SOLUTION INTRAMUSCULAR; INTRAVENOUS at 19:25

## 2017-10-10 RX ADMIN — ENOXAPARIN SODIUM 40 MG: 100 INJECTION SUBCUTANEOUS at 19:25

## 2017-10-10 ASSESSMENT — COPD QUESTIONNAIRES
COPD SCREENING SCORE: 2
HAVE YOU SMOKED AT LEAST 100 CIGARETTES IN YOUR ENTIRE LIFE: NO/DON'T KNOW
DURING THE PAST 4 WEEKS HOW MUCH DID YOU FEEL SHORT OF BREATH: NONE/LITTLE OF THE TIME
DO YOU EVER COUGH UP ANY MUCUS OR PHLEGM?: NO/ONLY WITH OCCASIONAL COLDS OR INFECTIONS

## 2017-10-10 ASSESSMENT — LIFESTYLE VARIABLES: EVER_SMOKED: UNABLE TO EVALUATE AT THIS TIME - NEEDS ASSESSMENT PRIOR TO DISCHARGE

## 2017-10-10 NOTE — ED NOTES
85 y/o female transfer from [place] for [facility]. She lives alone and spoke with family on the phone last night and sounded well. Today she was discovered to be very confused and agitated. Given normal saline 2300mL and cipro 400mg IV prior to arrival for UTI. AAOxself only. Placed in two point soft restraints as a result of pulling at lines.  SIRS score 3, orders placed per protocol. Chart up for ERP.

## 2017-10-10 NOTE — ED PROVIDER NOTES
"ED Provider Note    Scribed for Dr. Andres Elliott M.D. by Fernando Nunez. 10/10/2017  3:20 PM    Primary Care Provider: Angelito Thomas M.D.  Means of arrival: EMS  History limited by: Altered level of consciousness    CHIEF COMPLAINT  Chief Complaint   Patient presents with   • ALOC       HPI  Marifer Eldridge is a 86 y.o. female who presents to the ED brought in by EMS as a transfer patient from [place] due to an altered level of consciousness. Per nurse's notes, the patient lives alone. Her brother saw her at 5 PM last night and she seemed well. Today, she was found unresponsive in her home by her family. The patient denies pain currently and states that \"nothing\" happened to her. The patient thinks that she is 24 years old and she does not know where she is or why she is here.     Further history of present illness cannot be obtained due to the patient's altered level of consciousness.      REVIEW OF SYSTEMS    NEUROLOGIC:  Positive for altered level of consciousness.   See HPI for further details. Further review of systems is unobtainable as noted above.   C    PAST MEDICAL HISTORY  Past Medical History:   Diagnosis Date   • Cancer (CMS-HCC) 2004    ovarian   • Arthritis     back   • Dental disorder     upper partial   • Essential hypertension, benign    • Generalized anxiety disorder    • Heart murmur     systolic   • Other specified disorder of intestines     diarrhea from some foods   • Overactive bladder    • Pain     low back pain   • Panic attacks    • Reactive airway disease     uses no inhalers   • Symptomatic menopausal or female climacteric states    • Unspecified cataract     bilateral IOL   • Unspecified pleural effusion    • Unspecified urinary incontinence        FAMILY HISTORY  Patient would not tell me    SOCIAL HISTORY   reports that she has never smoked. She has never used smokeless tobacco. She reports that she does not drink alcohol or use drugs.    SURGICAL HISTORY  Past Surgical " "History:   Procedure Laterality Date   • LUMBAR LAMINECTOMY DISKECTOMY  1/27/2015    Performed by Maxwell Lei M.D. at [facility]   • COLONOSCOPY  3/2010    Normal    • OOPHORECTOMY  5/2008       Dr Dee   • THORACOTOMY  01/2008    pleurodesis   • ANGIOGRAM  02/23/2007    cardiac  wnl   • ABDOMINAL EXPLORATION     • ABDOMINAL HYSTERECTOMY TOTAL      still has 1 ovary   • COLONOSCOPY  1999, 2005    both ok        CURRENT MEDICATIONS  No current facility-administered medications on file prior to encounter.      Current Outpatient Prescriptions on File Prior to Encounter   Medication Sig Dispense Refill   • triamterene/hctz (MAXZIDE-25/DYAZIDE) 37.5-25 MG Cap TAKE 1 CAPSULE EVERY       MORNING 90 Cap 3   • paroxetine (PAXIL) 20 MG Tab TAKE 1 TABLET DAILY WITH   FOOD 90 Tab 3   • diphenoxylate-atropine (LOMOTIL) 2.5-0.025 MG Tab TAKE  (1)  TABLET  FOUR TIMES DAILY AS NEEDED FOR DIARRHEA. 90 Tab 1   • propranolol (INDERAL) 10 MG Tab TAKE (1) TABLET BY MOUTH TWICE DAILY. 180 Tab 3   • alendronate (FOSAMAX) 70 MG Tab TAKE 1 TABLET EVERY 7 DAYS 12 Tab 3   • metaxalone 400 MG Tab Take 400-800 mg by mouth 3 times a day as needed for Muscle Spasms. 60 Tab 2   • aspirin EC (ECOTRIN) 325 MG Tablet Delayed Response Take 1 Tab by mouth every day. 90 Tab 3   • latanoprost (XALATAN) 0.005 % SOLN Place 1 Drop in right eye every evening.     • vitamin D (CHOLECALCIFEROL) 1000 UNIT TABS Take 1 Tab by mouth every day. 30 Tab 3       ALLERGIES  Allergies   Allergen Reactions   • Chocolate      Diarrhea   • Metrogel      Headaches   • Tessalon [Benzonatate]      Headache     • Zoloft      headache       PHYSICAL EXAM  VITAL SIGNS: /57   Pulse 90   Temp 37.5 °C (99.5 °F)   Resp 14   Ht 1.575 m (5' 2\")   Wt 48.1 kg (106 lb)   LMP 03/20/2008   SpO2 94%   BMI 19.39 kg/m²      Constitutional: Non-verbal, opens eyes when I walk in. Trying to cover herself up with sheet but is not verbal. After a few minutes in room, " starts talking a little bit. But very confused.. Very pale and ill-appearing.   HENT: Normocephalic, Atraumatic, Bilateral external ears normal, Oropharynx pink and very dry with no exudates, Nose patent.  Eyes: Sclera and conjunctiva clear, No discharge.   Neck: Anterior is midline, Supple no nuchal rigidity, no thyromegaly or mass. Non-tender  Lymphatic: No supraclavicular lymph nodes.   Cardiovascular: Heart is regular rate and rhythm, positive murmur, rub or thrill.   Thorax & Lungs: Chest is symmetrical, with good breath sounds anteriorly.  Abdomen: Soft, No tenderness no hepatosplenomegaly there is no guarding or rebound, No masses, No pulsatile masses.   Skin: Very pale. Warm, Dry, no petechia, purpura, or rash.   Back: Non tender with palpation, No CVA tenderness.   Extremities: No edema. Non tender.   Musculoskeletal: Restraints on arms. Resists me raising her legs. Good passive range of motion to upper or lower extremities. Pulses 2+ radially and femorally. Very thin frail female  Neurologic: Oriented to self only. Not following commands but moving arms and legs somewhat appropriately and trying to cover herself with sheets. Strength is 4/5 in the upper and lower extremities. Sensory could not tell she could feel me touching or not.  Psychiatric: Confused. Flat affect     LABS  Results for orders placed or performed during the hospital encounter of 10/10/17   Lactic acid (lactate)   Result Value Ref Range    Lactic Acid 2.2 (H) 0.5 - 2.0 mmol/L   Lactic acid (lactate)   Result Value Ref Range    Lactic Acid 1.7 0.5 - 2.0 mmol/L   CBC WITH DIFFERENTIAL   Result Value Ref Range    WBC 7.7 4.8 - 10.8 K/uL    RBC 3.60 (L) 4.20 - 5.40 M/uL    Hemoglobin 12.3 12.0 - 16.0 g/dL    Hematocrit 35.9 (L) 37.0 - 47.0 %    MCV 99.7 (H) 81.4 - 97.8 fL    MCH 34.2 (H) 27.0 - 33.0 pg    MCHC 34.3 33.6 - 35.0 g/dL    RDW 43.4 35.9 - 50.0 fL    Platelet Count 180 164 - 446 K/uL    MPV 9.5 9.0 - 12.9 fL    Neutrophils-Polys  89.80 (H) 44.00 - 72.00 %    Lymphocytes 6.10 (L) 22.00 - 41.00 %    Monocytes 3.70 0.00 - 13.40 %    Eosinophils 0.00 0.00 - 6.90 %    Basophils 0.10 0.00 - 1.80 %    Immature Granulocytes 0.30 0.00 - 0.90 %    Nucleated RBC 0.00 /100 WBC    Neutrophils (Absolute) 6.89 2.00 - 7.15 K/uL    Lymphs (Absolute) 0.47 (L) 1.00 - 4.80 K/uL    Monos (Absolute) 0.28 0.00 - 0.85 K/uL    Eos (Absolute) 0.00 0.00 - 0.51 K/uL    Baso (Absolute) 0.01 0.00 - 0.12 K/uL    Immature Granulocytes (abs) 0.02 0.00 - 0.11 K/uL    NRBC (Absolute) 0.00 K/uL   COMP METABOLIC PANEL   Result Value Ref Range    Sodium 138 135 - 145 mmol/L    Potassium 3.0 (L) 3.6 - 5.5 mmol/L    Chloride 101 96 - 112 mmol/L    Co2 28 20 - 33 mmol/L    Anion Gap 9.0 0.0 - 11.9    Glucose 158 (H) 65 - 99 mg/dL    Bun 23 (H) 8 - 22 mg/dL    Creatinine 0.61 0.50 - 1.40 mg/dL    Calcium 8.3 (L) 8.5 - 10.5 mg/dL    AST(SGOT) 23 12 - 45 U/L    ALT(SGPT) 15 2 - 50 U/L    Alkaline Phosphatase 48 30 - 99 U/L    Total Bilirubin 1.3 0.1 - 1.5 mg/dL    Albumin 3.6 3.2 - 4.9 g/dL    Total Protein 6.0 6.0 - 8.2 g/dL    Globulin 2.4 1.9 - 3.5 g/dL    A-G Ratio 1.5 g/dL   URINALYSIS   Result Value Ref Range    Micro Urine Req see below     Color Straw     Character Clear     Specific Gravity 1.025 <1.035    Ph 5.0 5.0 - 8.0    Glucose Negative Negative mg/dL    Ketones Trace (A) Negative mg/dL    Protein Negative Negative mg/dL    Bilirubin Negative Negative    Urobilinogen, Urine Normal Negative    Nitrite Negative Negative    Leukocyte Esterase Negative Negative    Occult Blood Negative Negative   PROTHROMBIN TIME   Result Value Ref Range    PT 14.3 12.0 - 14.6 sec    INR 1.08 0.87 - 1.13   APTT   Result Value Ref Range    APTT 25.0 24.7 - 36.0 sec   ESTIMATED GFR   Result Value Ref Range    GFR If African American >60 >60 mL/min/1.73 m 2    GFR If Non African American >60 >60 mL/min/1.73 m 2   TROPONIN   Result Value Ref Range    Troponin I 0.20 (H) 0.00 - 0.04 ng/mL    BTYPE NATRIURETIC PEPTIDE   Result Value Ref Range    B Natriuretic Peptide 65 0 - 100 pg/mL   MAGNESIUM   Result Value Ref Range    Magnesium 1.3 (L) 1.5 - 2.5 mg/dL   PHOSPHORUS   Result Value Ref Range    Phosphorus 2.6 2.5 - 4.5 mg/dL     All labs reviewed by me.    RADIOLOGY/PROCEDURES  DX-CHEST-PORTABLE (1 VIEW)   Final Result      Right midlung atelectasis versus scarring.      Tiny right midlung and left upper lobe pulmonary nodules.      Mild bronchial wall thickening can be seen in the setting of bronchitis.      Atherosclerotic plaque.      ECHOCARDIOGRAM COMP W/O CONT    (Results Pending)     The radiologist's interpretations of all radiological studies have been reviewed by me.     COURSE & MEDICAL DECISION MAKING  Pertinent Labs & Imaging studies reviewed. (See chart for details)    Differential diagnoses include but are not limited to Mental status changes secondary to stroke, intracranial bleed, infections, pneumonia, urinary tract infection, dehydration, medication side effects, electrolyte imbalances.    3:20 PM- Reviewed medical records from transferring hospital. She thought she was dehydrated and was drinking something so she could have a CT scan today. When she got to the hospital, she felt dehydrated with confusion and was given 2 L normal saline. CT head was negative but she was found to have a UTI. The patient was given cipro IV. She had abnormal labs with glucose of 157, potassium of 3.7, BUN of 30, creatinine of 0.89, elevated troponin at 0.07. Her urine drug screen is negative. She has a WBC count of 8 and hemoglobin of 14.5. Urine is positive for nitrates and leukocytes.     3:25 PM - Patient seen and examined at bedside. Ordered blood culture, urine culture, urinalysis, CMP, CBC with differential, lactic acid, and chest x-ray.  Patient will be medicated with NS infusion 1,000 mL IV since she looks dehydrated.     3:39 PM- Ordered phosphorous, magnesium, BNP, troponin, and estimated  GFR to further evaluate symptoms.     3:48 PM - I discussed the patient's case and the above findings with Dr. Beyer (Hospitalist) who will see the patient.     3:51 PM- Ordered prothrombin time, blood culture, BNP, troponin, and APTT to further evaluate symptoms.     Decision Making  Patient who we are told was doing well at 5:00 last night. She was doing some type of bowel prepped for a CAT scan today. However they found this morning with a marked decreased mental status. They felt she was dehydrated at the [facility] hospital. A urine showed urinary tract infection positive. CAT scan was negative. She was given IV antibiotics and IV fluids. She isn't transferred to the emergency department here for further care and evaluation is given IV fluids here we ordered further blood work on her. [facility] Again she is receiving IV fluid boluses lactate has been done along with blood cultures urine culture and IV antibiotics. Still unclear as to what happened to her possibly she became dehydrated and had a urinary tract infection and became septic and confused from this but this is still somewhat unclear.    DISPOSITION:  Patient will be admitted to  Dr. Beyer (Hospitalist) in guarded condition.    FINAL IMPRESSION  1. Confusion    2. Weakness    3. Dehydration    4. Urinary tract infection with hematuria, site unspecified        PLAN  1.Admission [facility]ist's  2. Continue IV fluids and IV antibiotics  3. Continue workup and evaluation of her mental status change       Fernando HENAO (Malena), am scribing for, and in the presence of, Andres Elliott M.D..    Electronically signed by: Fernando Nunez (Malena), 10/10/2017    Andres HENAO M.D. personally performed the services described in this documentation, as scribed by Fernando Nunez in my presence, and it is both accurate and complete.      The note accurately reflects work and  decisions made by me.  Andres Elliott  10/10/2017  8:34 PM

## 2017-10-11 ENCOUNTER — APPOINTMENT (OUTPATIENT)
Dept: RADIOLOGY | Facility: MEDICAL CENTER | Age: 82
DRG: 871 | End: 2017-10-11
Attending: INTERNAL MEDICINE
Payer: COMMERCIAL

## 2017-10-11 PROBLEM — E83.51 HYPOCALCEMIA: Status: ACTIVE | Noted: 2017-10-11

## 2017-10-11 PROBLEM — G93.40 ACUTE ENCEPHALOPATHY: Status: ACTIVE | Noted: 2017-10-11

## 2017-10-11 PROBLEM — E87.6 HYPOKALEMIA: Status: ACTIVE | Noted: 2017-10-11

## 2017-10-11 PROBLEM — R91.1 LUNG NODULE: Status: ACTIVE | Noted: 2017-10-11

## 2017-10-11 PROBLEM — J18.9 PNEUMONIA: Status: ACTIVE | Noted: 2017-10-10

## 2017-10-11 PROBLEM — E83.42 HYPOMAGNESEMIA: Status: ACTIVE | Noted: 2017-10-11

## 2017-10-11 PROBLEM — G93.40 ACUTE ENCEPHALOPATHY: Status: ACTIVE | Noted: 2017-10-10

## 2017-10-11 PROBLEM — I10 HTN (HYPERTENSION): Status: ACTIVE | Noted: 2017-10-11

## 2017-10-11 LAB
ANION GAP SERPL CALC-SCNC: 6 MMOL/L (ref 0–11.9)
ANION GAP SERPL CALC-SCNC: 9 MMOL/L (ref 0–11.9)
BUN SERPL-MCNC: 17 MG/DL (ref 8–22)
BUN SERPL-MCNC: 19 MG/DL (ref 8–22)
CA-I SERPL-SCNC: 1.1 MMOL/L (ref 1.1–1.3)
CALCIUM SERPL-MCNC: 7.5 MG/DL (ref 8.5–10.5)
CALCIUM SERPL-MCNC: 8.1 MG/DL (ref 8.5–10.5)
CHLORIDE SERPL-SCNC: 102 MMOL/L (ref 96–112)
CHLORIDE SERPL-SCNC: 109 MMOL/L (ref 96–112)
CHOLEST SERPL-MCNC: 171 MG/DL (ref 100–199)
CO2 SERPL-SCNC: 23 MMOL/L (ref 20–33)
CO2 SERPL-SCNC: 27 MMOL/L (ref 20–33)
CREAT SERPL-MCNC: 0.59 MG/DL (ref 0.5–1.4)
CREAT SERPL-MCNC: 0.69 MG/DL (ref 0.5–1.4)
GFR SERPL CREATININE-BSD FRML MDRD: >60 ML/MIN/1.73 M 2
GFR SERPL CREATININE-BSD FRML MDRD: >60 ML/MIN/1.73 M 2
GLUCOSE SERPL-MCNC: 112 MG/DL (ref 65–99)
GLUCOSE SERPL-MCNC: 95 MG/DL (ref 65–99)
HDLC SERPL-MCNC: 71 MG/DL
LACTATE BLD-SCNC: 1.1 MMOL/L (ref 0.5–2)
LACTATE BLD-SCNC: 3 MMOL/L (ref 0.5–2)
LACTATE BLD-SCNC: 3 MMOL/L (ref 0.5–2)
LDLC SERPL CALC-MCNC: 85 MG/DL
LV EJECT FRACT  99904: 75
LV EJECT FRACT MOD 2C 99903: 86.75
LV EJECT FRACT MOD 4C 99902: 88.22
LV EJECT FRACT MOD BP 99901: 87.65
POTASSIUM SERPL-SCNC: 3.1 MMOL/L (ref 3.6–5.5)
POTASSIUM SERPL-SCNC: 3.7 MMOL/L (ref 3.6–5.5)
PROCALCITONIN SERPL-MCNC: <0.05 NG/ML
SODIUM SERPL-SCNC: 138 MMOL/L (ref 135–145)
SODIUM SERPL-SCNC: 138 MMOL/L (ref 135–145)
TRIGL SERPL-MCNC: 74 MG/DL (ref 0–149)
TROPONIN I SERPL-MCNC: 0.2 NG/ML (ref 0–0.04)
TROPONIN I SERPL-MCNC: 0.26 NG/ML (ref 0–0.04)

## 2017-10-11 PROCEDURE — 99233 SBSQ HOSP IP/OBS HIGH 50: CPT | Performed by: INTERNAL MEDICINE

## 2017-10-11 PROCEDURE — 87493 C DIFF AMPLIFIED PROBE: CPT

## 2017-10-11 PROCEDURE — 700102 HCHG RX REV CODE 250 W/ 637 OVERRIDE(OP): Performed by: INTERNAL MEDICINE

## 2017-10-11 PROCEDURE — 83605 ASSAY OF LACTIC ACID: CPT | Mod: 91

## 2017-10-11 PROCEDURE — 90662 IIV NO PRSV INCREASED AG IM: CPT | Performed by: INTERNAL MEDICINE

## 2017-10-11 PROCEDURE — 700101 HCHG RX REV CODE 250: Performed by: INTERNAL MEDICINE

## 2017-10-11 PROCEDURE — 770020 HCHG ROOM/CARE - TELE (206)

## 2017-10-11 PROCEDURE — 82330 ASSAY OF CALCIUM: CPT

## 2017-10-11 PROCEDURE — 93306 TTE W/DOPPLER COMPLETE: CPT

## 2017-10-11 PROCEDURE — 80048 BASIC METABOLIC PNL TOTAL CA: CPT

## 2017-10-11 PROCEDURE — 84484 ASSAY OF TROPONIN QUANT: CPT

## 2017-10-11 PROCEDURE — 700111 HCHG RX REV CODE 636 W/ 250 OVERRIDE (IP): Performed by: INTERNAL MEDICINE

## 2017-10-11 PROCEDURE — 700117 HCHG RX CONTRAST REV CODE 255: Performed by: INTERNAL MEDICINE

## 2017-10-11 PROCEDURE — 93306 TTE W/DOPPLER COMPLETE: CPT | Mod: 26 | Performed by: INTERNAL MEDICINE

## 2017-10-11 PROCEDURE — 700105 HCHG RX REV CODE 258: Performed by: INTERNAL MEDICINE

## 2017-10-11 PROCEDURE — 51798 US URINE CAPACITY MEASURE: CPT

## 2017-10-11 PROCEDURE — 71275 CT ANGIOGRAPHY CHEST: CPT

## 2017-10-11 PROCEDURE — 84145 PROCALCITONIN (PCT): CPT

## 2017-10-11 PROCEDURE — 90471 IMMUNIZATION ADMIN: CPT

## 2017-10-11 PROCEDURE — 36415 COLL VENOUS BLD VENIPUNCTURE: CPT

## 2017-10-11 PROCEDURE — A9270 NON-COVERED ITEM OR SERVICE: HCPCS | Performed by: INTERNAL MEDICINE

## 2017-10-11 PROCEDURE — 80061 LIPID PANEL: CPT

## 2017-10-11 RX ORDER — PAROXETINE 10 MG/1
20 TABLET, FILM COATED ORAL DAILY
Status: DISCONTINUED | OUTPATIENT
Start: 2017-10-11 | End: 2017-10-12 | Stop reason: HOSPADM

## 2017-10-11 RX ORDER — METAXALONE 800 MG/1
400-800 TABLET ORAL 3 TIMES DAILY PRN
Status: DISCONTINUED | OUTPATIENT
Start: 2017-10-11 | End: 2017-10-12 | Stop reason: HOSPADM

## 2017-10-11 RX ORDER — SODIUM CHLORIDE 9 MG/ML
1000 INJECTION, SOLUTION INTRAVENOUS ONCE
Status: COMPLETED | OUTPATIENT
Start: 2017-10-11 | End: 2017-10-11

## 2017-10-11 RX ORDER — L. ACIDOPHILUS/L.BULGARICUS 100MM CELL
1 GRANULES IN PACKET (EA) ORAL
Status: DISCONTINUED | OUTPATIENT
Start: 2017-10-11 | End: 2017-10-12 | Stop reason: HOSPADM

## 2017-10-11 RX ORDER — ASPIRIN 325 MG
325 TABLET ORAL DAILY
Status: DISCONTINUED | OUTPATIENT
Start: 2017-10-11 | End: 2017-10-12 | Stop reason: HOSPADM

## 2017-10-11 RX ORDER — PROPRANOLOL HYDROCHLORIDE 10 MG/1
10 TABLET ORAL 2 TIMES DAILY
Status: DISCONTINUED | OUTPATIENT
Start: 2017-10-11 | End: 2017-10-12 | Stop reason: HOSPADM

## 2017-10-11 RX ORDER — POTASSIUM CHLORIDE 20 MEQ/1
40 TABLET, EXTENDED RELEASE ORAL ONCE
Status: COMPLETED | OUTPATIENT
Start: 2017-10-11 | End: 2017-10-11

## 2017-10-11 RX ORDER — OXYBUTYNIN CHLORIDE 5 MG/1
5 TABLET ORAL 4 TIMES DAILY PRN
Status: DISCONTINUED | OUTPATIENT
Start: 2017-10-11 | End: 2017-10-12 | Stop reason: HOSPADM

## 2017-10-11 RX ORDER — METAXALONE 400 MG/1
400-800 TABLET ORAL 3 TIMES DAILY PRN
Status: DISCONTINUED | OUTPATIENT
Start: 2017-10-11 | End: 2017-10-11

## 2017-10-11 RX ORDER — HYDROCODONE BITARTRATE AND ACETAMINOPHEN 5; 325 MG/1; MG/1
1-2 TABLET ORAL EVERY 4 HOURS PRN
Status: DISCONTINUED | OUTPATIENT
Start: 2017-10-11 | End: 2017-10-12 | Stop reason: HOSPADM

## 2017-10-11 RX ORDER — LATANOPROST 50 UG/ML
1 SOLUTION/ DROPS OPHTHALMIC NIGHTLY
Status: DISCONTINUED | OUTPATIENT
Start: 2017-10-11 | End: 2017-10-12 | Stop reason: HOSPADM

## 2017-10-11 RX ADMIN — PROPRANOLOL HYDROCHLORIDE 10 MG: 10 TABLET ORAL at 15:17

## 2017-10-11 RX ADMIN — ASPIRIN 325 MG: 325 TABLET, COATED ORAL at 01:09

## 2017-10-11 RX ADMIN — SODIUM CHLORIDE 1000 ML: 9 INJECTION, SOLUTION INTRAVENOUS at 01:09

## 2017-10-11 RX ADMIN — LACTOBACILLUS ACIDOPHILUS / LACTOBACILLUS BULGARICUS 1 PACKET: 100 MILLION CFU STRENGTH GRANULES at 11:39

## 2017-10-11 RX ADMIN — POTASSIUM CHLORIDE 40 MEQ: 1500 TABLET, EXTENDED RELEASE ORAL at 01:09

## 2017-10-11 RX ADMIN — IOHEXOL: 350 INJECTION, SOLUTION INTRAVENOUS at 11:21

## 2017-10-11 RX ADMIN — POTASSIUM CHLORIDE AND SODIUM CHLORIDE: 900; 150 INJECTION, SOLUTION INTRAVENOUS at 04:55

## 2017-10-11 RX ADMIN — AZITHROMYCIN FOR INJECTION INJECTION, POWDER, LYOPHILIZED, FOR SOLUTION 500 MG: 500 INJECTION INTRAVENOUS at 12:33

## 2017-10-11 RX ADMIN — LACTOBACILLUS ACIDOPHILUS / LACTOBACILLUS BULGARICUS 1 PACKET: 100 MILLION CFU STRENGTH GRANULES at 15:17

## 2017-10-11 RX ADMIN — PROPRANOLOL HYDROCHLORIDE 10 MG: 10 TABLET ORAL at 20:22

## 2017-10-11 RX ADMIN — LATANOPROST 1 DROP: 50 SOLUTION OPHTHALMIC at 20:22

## 2017-10-11 RX ADMIN — MAGNESIUM GLUCONATE 500 MG ORAL TABLET 400 MG: 500 TABLET ORAL at 11:39

## 2017-10-11 RX ADMIN — PAROXETINE HYDROCHLORIDE 20 MG: 10 TABLET, FILM COATED ORAL at 15:17

## 2017-10-11 RX ADMIN — SODIUM CHLORIDE 1000 ML: 9 INJECTION, SOLUTION INTRAVENOUS at 18:39

## 2017-10-11 RX ADMIN — POTASSIUM CHLORIDE AND SODIUM CHLORIDE: 900; 150 INJECTION, SOLUTION INTRAVENOUS at 15:21

## 2017-10-11 RX ADMIN — CEFTRIAXONE 2 G: 2 INJECTION, SOLUTION INTRAVENOUS at 07:51

## 2017-10-11 RX ADMIN — STANDARDIZED SENNA CONCENTRATE AND DOCUSATE SODIUM 2 TABLET: 8.6; 5 TABLET, FILM COATED ORAL at 07:55

## 2017-10-11 RX ADMIN — ENOXAPARIN SODIUM 40 MG: 100 INJECTION SUBCUTANEOUS at 07:44

## 2017-10-11 RX ADMIN — INFLUENZA A VIRUSA/MICHIGAN/45/2015 X-275 (H1N1) ANTIGEN (FORMALDEHYDE INACTIVATED), INFLUENZA A VIRUS A/HONG KONG/4801/2014 X-263B (H3N2) ANTIGEN (FORMALDEHYDE INACTIVATED), AND INFLUENZA B VIRUS B/BRISBANE/60/2008 ANTIGEN (FORMALDEHYDE INACTIVATED) 0.5 ML: 60; 60; 60 INJECTION, SUSPENSION INTRAMUSCULAR at 07:45

## 2017-10-11 RX ADMIN — VITAMIN D, TAB 1000IU (100/BT) 1000 UNITS: 25 TAB at 15:17

## 2017-10-11 ASSESSMENT — COGNITIVE AND FUNCTIONAL STATUS - GENERAL
HELP NEEDED FOR BATHING: A LITTLE
MOVING TO AND FROM BED TO CHAIR: A LITTLE
TOILETING: A LITTLE
DRESSING REGULAR LOWER BODY CLOTHING: A LITTLE
DAILY ACTIVITIY SCORE: 18
TURNING FROM BACK TO SIDE WHILE IN FLAT BAD: A LITTLE
MOBILITY SCORE: 18
EATING MEALS: A LITTLE
SUGGESTED CMS G CODE MODIFIER MOBILITY: CK
CLIMB 3 TO 5 STEPS WITH RAILING: A LITTLE
WALKING IN HOSPITAL ROOM: A LITTLE
PERSONAL GROOMING: A LITTLE
STANDING UP FROM CHAIR USING ARMS: A LITTLE
SUGGESTED CMS G CODE MODIFIER DAILY ACTIVITY: CK
MOVING FROM LYING ON BACK TO SITTING ON SIDE OF FLAT BED: A LITTLE
DRESSING REGULAR UPPER BODY CLOTHING: A LITTLE

## 2017-10-11 ASSESSMENT — PAIN SCALES - GENERAL
PAINLEVEL_OUTOF10: 0

## 2017-10-11 ASSESSMENT — PATIENT HEALTH QUESTIONNAIRE - PHQ9
SUM OF ALL RESPONSES TO PHQ QUESTIONS 1-9: 0
SUM OF ALL RESPONSES TO PHQ9 QUESTIONS 1 AND 2: 0
1. LITTLE INTEREST OR PLEASURE IN DOING THINGS: NOT AT ALL
2. FEELING DOWN, DEPRESSED, IRRITABLE, OR HOPELESS: NOT AT ALL

## 2017-10-11 ASSESSMENT — LIFESTYLE VARIABLES: EVER_SMOKED: NEVER

## 2017-10-11 NOTE — PROGRESS NOTES
0600: Restraints discontinued. Pt A/Ox3, not clear as to reason she's hospitalized.  0643: Hospitalist paged  0647: Dr. Lizzy Amaral notified of drop in pt's Calcium level. Current Ca = 7.5. Verbal order given for ionized calcium level, order placed in EPIC.  0655: Bedside report given to JOHNNY Blackwood. Bed alarm on, call light within reach, bed locked & in lowest position, side rails up x2, safety maintained, pt demonstrated proper use of call light.

## 2017-10-11 NOTE — ASSESSMENT & PLAN NOTE
- pt admits to not drinking adequately at home. She lives alone.   - continue IV NS+KCl at 125cc/hr. Repeat BMP in AM.   - monitor hemodynamics.

## 2017-10-11 NOTE — ASSESSMENT & PLAN NOTE
- likely due possible pneumonia/bronchitis. Dehydration likely significantly contributing. Doubt sepsis contributing.   - continue antibiotic. Continue IVF rehydration.   - seems to be back to her baseline. Frequent re-orientation, reestablish circadian rhythm, encourage familiar faces/family in room, avoid or minimize narcotics/sedatives.

## 2017-10-11 NOTE — PROGRESS NOTES
[facility] Hospitalist Progress Note    Date of Service: 10/11/2017    Chief Complaint  86 y.o. female with HTN, reactive airway disease, arthritis, admitted 10/10/2017 with AMS, found unconscious by her family, and was confused on awakening. Noted to be very dehydrated, lactate 2.5. Trop 0.26. No leukocytosis. K 3.0. Crea WNL. BNP 65. UA showed 2-5 WBC, trace ketones, negative nitrite/LE. CXR showed mild bronchial wall thickening, with tiny right midlung and ANTONIO pulmonary nodules. Felt to have sepsis. Started on IVF ceftriaxone and IVF.     Interval Problem Update  10/11/2017 - uneventful night. VSS. Afebrile. Saturating well on RA. BMP unimpressive. Mg 1.3.     > Seen and examined. Feeling better. No CP, SOB, nausea, vomiting, abd pain, diarrhea. Denied any cough/phlegm. Admits to not drinking or eating much in last several days.         Consultants/Specialty  none    Disposition  Monitor on telemetry. PT/OT eval.         ROS     Pertinent positives/negatives as mentioned above.     A complete review of systems was done. All other systems were negative.      Physical Exam  Laboratory/Imaging   Hemodynamics  Temp (24hrs), Av.4 °C (99.3 °F), Min:37.2 °C (99 °F), Max:37.5 °C (99.5 °F)   Temperature: 37.4 °C (99.4 °F)  Pulse  Av.4  Min: 75  Max: 98 Heart Rate (Monitored): 83  Blood Pressure : 114/62, NIBP: 112/55      Respiratory      Respiration: 16, Pulse Oximetry: 93 %, O2 Daily Delivery Respiratory : Silicone Nasal Cannula             Fluids    Intake/Output Summary (Last 24 hours) at 10/11/17 7432  Last data filed at 10/11/17 0504   Gross per 24 hour   Intake              120 ml   Output              275 ml   Net             -155 ml       Nutrition  Orders Placed This Encounter   Procedures   • DIET ORDER     Standing Status:   Standing     Number of Occurrences:   1     Order Specific Question:   Diet:     Answer:   Cardiac [6]     Physical Exam   Constitutional: She appears well-developed. No distress.    Thin   HENT:   Head: Normocephalic and atraumatic.   Mouth/Throat: No oropharyngeal exudate.   Dry lips/oral mucosa   Eyes: Conjunctivae are normal. Pupils are equal, round, and reactive to light. No scleral icterus.   Neck: Normal range of motion. Neck supple.   Cardiovascular: Normal rate and regular rhythm.  Exam reveals no gallop and no friction rub.    No murmur heard.  Pulmonary/Chest: Effort normal and breath sounds normal. No respiratory distress. She has no wheezes. She has no rales. She exhibits no tenderness.   Bibasilar crackles   Abdominal: Soft. Bowel sounds are normal. She exhibits no distension. There is no tenderness. There is no rebound and no guarding.   Musculoskeletal: Normal range of motion. She exhibits no edema or tenderness.   Lymphadenopathy:     She has no cervical adenopathy.   Neurological: She is alert. No cranial nerve deficit. Coordination normal.   Skin: Skin is warm and dry. No rash noted. No erythema. No pallor.   Psychiatric: She has a normal mood and affect. Her behavior is normal. Judgment and thought content normal.   Nursing note and vitals reviewed.      Recent Labs      10/10/17   1539  10/10/17   2332   WBC  7.7  9.2   RBC  3.60*  3.34*   HEMOGLOBIN  12.3  11.3*   HEMATOCRIT  35.9*  33.6*   MCV  99.7*  100.6*   MCH  34.2*  33.8*   MCHC  34.3  33.6   RDW  43.4  43.9   PLATELETCT  180  183   MPV  9.5  9.7     Recent Labs      10/10/17   1539  10/10/17   2317  10/11/17   0517   SODIUM  138  138  138   POTASSIUM  3.0*  3.1*  3.7   CHLORIDE  101  102  109   CO2  28  27  23   GLUCOSE  158*  112*  95   BUN  23*  19  17   CREATININE  0.61  0.69  0.59   CALCIUM  8.3*  8.1*  7.5*     Recent Labs      10/10/17   1539   APTT  25.0   INR  1.08     Recent Labs      10/10/17   1539   BNPBTYPENAT  65              Assessment/Plan     Sepsis (CMS-[facility])- (present on admission)   Assessment & Plan    - This is sepsis (without associated acute organ dysfunction). Source likely respiratory. I  do not think she has UTI, UA was very unimpressive.   - continue IV ceftriaxone, will add azithromycin. Check procalcitonin. Follow cultures. Will get CTA chest to r/o consolidation.   - continue IVF, with PRN bolus per sepsis protocol. Will do serial lactate monitoring.   - close hemodynamic monitoring on telemetry.         Pneumonia- (present on admission)   Assessment & Plan    - radiographic findings consistent with possible pneumonia/bronchitis.   - continue IV ceftriaxone, add azithromycin. Check procalcitonin, and trend. Will get chest CT to evaluate further for consolidation.   - follow cultures.        Hypomagnesemia- (present on admission)   Assessment & Plan    - start magnesium oxide 400mg Po daily.         Hypocalcemia- (present on admission)   Assessment & Plan    - await ionized calcium, replace if low.         Hypokalemia- (present on admission)   Assessment & Plan    - continue K replacement with IVF. Replace magnesium.        Elevated troponin, likely demand ischemia- (present on admission)   Assessment & Plan    - await echo to evaluate for regional hypokinesia. She will need cardiac stress test once acute issues resolve. Continue ASA. Check lipid profile. Check CTA chest r/o PE.   - continue to monitor on telemetry.         Dehydration- (present on admission)   Assessment & Plan    - pt admits to not drinking adequately at home. She lives alone.   - continue IV NS+KCl at 125cc/hr. Repeat BMP in AM.   - monitor hemodynamics.         Acute encephalopathy- (present on admission)   Assessment & Plan    - likely due possible pneumonia/bronchitis. Dehydration likely significantly contributing. Doubt sepsis contributing.   - continue antibiotic. Continue IVF rehydration.   - seems to be back to her baseline. Frequent re-orientation, reestablish circadian rhythm, encourage familiar faces/family in room, avoid or minimize narcotics/sedatives.           HTN (hypertension)- (present on admission)    Assessment & Plan    - resume home dose propranolol. Hold triamterene/HCTZ for now as hydrating.         Lung nodule- (present on admission)   Assessment & Plan    - low risk. Never a smoker. Chest CT to further evaluate.   - will need continued outpatient surveillance of lung nodule per Fleischner society recommendations.             Reviewed items::  Labs reviewed, Medications reviewed and Radiology images reviewed  Michael catheter::  No Michael  DVT prophylaxis pharmacological::  Enoxaparin (Lovenox)  Ulcer Prophylaxis::  Not indicated  Antibiotics:  Treating active infection/contamination beyond 24 hours perioperative coverage

## 2017-10-11 NOTE — H&P
Hospital Medicine History and Physical    Date of Service  10/10/2017    Chief Complaint  Chief Complaint   Patient presents with   • ALOC       History of Presenting Illness  86 y.o. female who presented 10/10/2017 with altered mental status and dehydration. History is limited due to patients mental status. She was found unconscious at her home by her family. On awakening she was more confused than her baseline. She was sent to the ER for further evaluation. This time the patient is confused and is not answering questions appropriately. She is on a very severely dehydrated and elevated lactate of 2.5. Troponin was elevated 0.26. She does not appear to complain of any chest pain or shortness of breath    Primary Care Physician  Angelito Thomas M.D.    Consultants  None    Code Status  Full Code    Review of Systems  Review of Systems   Unable to perform ROS: Mental acuity        Past Medical History  Past Medical History:   Diagnosis Date   • Cancer (CMS-HCC) 2004    ovarian   • Arthritis     back   • Dental disorder     upper partial   • Essential hypertension, benign    • Generalized anxiety disorder    • Heart murmur     systolic   • Other specified disorder of intestines     diarrhea from some foods   • Overactive bladder    • Pain     low back pain   • Panic attacks    • Reactive airway disease     uses no inhalers   • Symptomatic menopausal or female climacteric states    • Unspecified cataract     bilateral IOL   • Unspecified pleural effusion    • Unspecified urinary incontinence        Surgical History  Past Surgical History:   Procedure Laterality Date   • LUMBAR LAMINECTOMY DISKECTOMY  1/27/2015    Performed by Maxwell Lei M.D. at SURGERY [facility] ORS   • COLONOSCOPY  3/2010    Normal    • OOPHORECTOMY  5/2008       Dr Dee   • THORACOTOMY  01/2008    pleurodesis   • ANGIOGRAM  02/23/2007    cardiac  wnl   • ABDOMINAL EXPLORATION     • ABDOMINAL HYSTERECTOMY TOTAL      still has 1 ovary   •  COLONOSCOPY  ,     both ok        Medications  No current facility-administered medications on file prior to encounter.      Current Outpatient Prescriptions on File Prior to Encounter   Medication Sig Dispense Refill   • triamterene/hctz (MAXZIDE-25/DYAZIDE) 37.5-25 MG Cap TAKE 1 CAPSULE EVERY       MORNING 90 Cap 3   • paroxetine (PAXIL) 20 MG Tab TAKE 1 TABLET DAILY WITH   FOOD 90 Tab 3   • diphenoxylate-atropine (LOMOTIL) 2.5-0.025 MG Tab TAKE  (1)  TABLET  FOUR TIMES DAILY AS NEEDED FOR DIARRHEA. 90 Tab 1   • propranolol (INDERAL) 10 MG Tab TAKE (1) TABLET BY MOUTH TWICE DAILY. 180 Tab 3   • alendronate (FOSAMAX) 70 MG Tab TAKE 1 TABLET EVERY 7 DAYS 12 Tab 3   • metaxalone 400 MG Tab Take 400-800 mg by mouth 3 times a day as needed for Muscle Spasms. 60 Tab 2   • hydrocodone-acetaminophen (NORCO) 5-325 MG Tab per tablet Take 1-2 Tabs by mouth every four hours as needed. (Patient not taking: Reported on 2017) 60 Tab 0   • aspirin EC (ECOTRIN) 325 MG Tablet Delayed Response Take 1 Tab by mouth every day. 90 Tab 3   • latanoprost (XALATAN) 0.005 % SOLN Place 1 Drop in right eye every evening.     • vitamin D (CHOLECALCIFEROL) 1000 UNIT TABS Take 1 Tab by mouth every day. 30 Tab 3       Family History  No family history on file.    Social History  Social History   Substance Use Topics   • Smoking status: Never Smoker   • Smokeless tobacco: Never Used   • Alcohol use No       Allergies  Allergies   Allergen Reactions   • Chocolate      Diarrhea   • Metrogel      Headaches   • Tessalon [Benzonatate]      Headache     • Zoloft      headache        Physical Exam  Laboratory   Hemodynamics  Temp (24hrs), Av.5 °C (99.5 °F), Min:37.5 °C (99.5 °F), Max:37.5 °C (99.5 °F)   Temperature: 37.5 °C (99.5 °F)  Pulse  Av.8  Min: 85  Max: 98 Heart Rate (Monitored): 98  Blood Pressure : 129/57, NIBP: 126/59      Respiratory      Respiration: 14, Pulse Oximetry: 92 %             Physical Exam   Constitutional:    Frail   HENT:   Head: Normocephalic and atraumatic.   Oral mucous membranes are dry   Eyes: Conjunctivae are normal. Pupils are equal, round, and reactive to light.   Neck: Neck supple.   Cardiovascular: Normal rate and regular rhythm.    Pulmonary/Chest: Effort normal and breath sounds normal. No respiratory distress. She has no wheezes. She has no rales.   Abdominal: Soft. Bowel sounds are normal. She exhibits no distension. There is no tenderness.   Musculoskeletal: Normal range of motion. She exhibits no edema.   Neurological: She is alert. No cranial nerve deficit. Coordination normal.   Disoriented to time place and person  Moves all 4 extremities purposefully   Skin: Skin is warm and dry.   Psychiatric: She has a normal mood and affect. Her behavior is normal.   Nursing note and vitals reviewed.      Recent Labs      10/10/17   1539   WBC  7.7   RBC  3.60*   HEMOGLOBIN  12.3   HEMATOCRIT  35.9*   MCV  99.7*   MCH  34.2*   MCHC  34.3   RDW  43.4   PLATELETCT  180   MPV  9.5     Recent Labs      10/10/17   1539   SODIUM  138   POTASSIUM  3.0*   CHLORIDE  101   CO2  28   GLUCOSE  158*   BUN  23*   CREATININE  0.61   CALCIUM  8.3*     Recent Labs      10/10/17   1539   ALTSGPT  15   ASTSGOT  23   ALKPHOSPHAT  48   TBILIRUBIN  1.3   GLUCOSE  158*     Recent Labs      10/10/17   1539   APTT  25.0   INR  1.08     Recent Labs      10/10/17   1539   BNPBTYPENAT  65         Lab Results   Component Value Date    TROPONINI 0.20 (H) 10/10/2017     Urinalysis:    Lab Results  Component Value Date/Time   SPECGRAVITY 1.025 10/10/2017 1628   GLUCOSEUR Negative 10/10/2017 1628   KETONES Trace (A) 10/10/2017 1628   NITRITE Negative 10/10/2017 1628   WBCURINE 2-5 04/30/2008 1230   BACTERIA Few (A) 04/30/2008 1230   EPITHELCELL Few 04/30/2008 1230        Imaging  DX-CHEST-PORTABLE (1 VIEW)   Final Result      Right midlung atelectasis versus scarring.      Tiny right midlung and left upper lobe pulmonary nodules.      Mild  bronchial wall thickening can be seen in the setting of bronchitis.      Atherosclerotic plaque.      ECHOCARDIOGRAM COMP W/O CONT    (Results Pending)        Assessment/Plan     I anticipate this patient will require at least two midnights for appropriate medical management, necessitating inpatient admission.    Sepsis (CMS-HCC)- (present on admission)   Assessment & Plan    This is sepsis (without associated acute organ dysfunction).   Sources UTI  IV fluids for septic protocol  Trend lactate to resolution  Continue IV ceftriaxone   Follow cultures        UTI (urinary tract infection)- (present on admission)   Assessment & Plan    Started on IV ceftriaxone  Follow blood and urine cultures  Continue IV fluid hydration        Dehydration- (present on admission)   Assessment & Plan    Continue IV fluid hydration  Follow BMP        Altered mental status- (present on admission)   Assessment & Plan    She likely has baseline dementia and she is currently confused  This is likely secondary to urinary tract infection, patient has been started on ceftriaxone  Avoid sedatives  Frequent reorientation            VTE prophylaxis: Lovenox.

## 2017-10-11 NOTE — PROGRESS NOTES
2 RN skin assessment with JOHNNY oNel.    Bilateral ears reddened & blanchable.  Bruising and swelling noted to nose.  BUE bruising noted.  Coccyx reddened & blanchable.  Bruise to right hip.  Bilateral heels dry/cracked.

## 2017-10-11 NOTE — ASSESSMENT & PLAN NOTE
- low risk. Never a smoker. Chest CT to further evaluate.   - will need continued outpatient surveillance of lung nodule per Fleischner society recommendations.

## 2017-10-11 NOTE — PROGRESS NOTES
0025: Hosptialist paged.  0052: Dr. Beyer returned page, informed that pt's K = 3.1, Lactic Acid = 2.5, & Troponin = 0.26. MD to put orders in.  0229: Hospitalist paged  0235: Dr. Fraire called back, updated on pt's status; bolus was complete, UOP low. Verbal order given for BMP with next scheduled Troponin & to straight cath if bladder scan >300ml. Ok to order cardiac diet.

## 2017-10-11 NOTE — ASSESSMENT & PLAN NOTE
- radiographic findings consistent with possible pneumonia/bronchitis.   - continue IV ceftriaxone, add azithromycin. Check procalcitonin, and trend. Will get chest CT to evaluate further for consolidation.   - follow cultures.

## 2017-10-11 NOTE — PROGRESS NOTES
Pt to floor @ 1950, bedside report given by ER nurse, . Pt confused, alert to self only, in bilateral soft wrist restraints. Pt is in a normal Sinus Rhythm per Josh in monitor room. Unable to complete admission database due to pt's condition, no family at bedside. Bed alarm on, bed locked & in lowest position, side rails up x2, room door open, monitoring ongoing.

## 2017-10-11 NOTE — ASSESSMENT & PLAN NOTE
- await echo to evaluate for regional hypokinesia. She will need cardiac stress test once acute issues resolve. Continue ASA. Check lipid profile. Check CTA chest r/o PE.   - continue to monitor on telemetry.

## 2017-10-11 NOTE — ED NOTES
Pt is unable to participate in an interview  At this time.  Med rec is updated placing call to pts home pharmacy.  No antibiotics noted on file.  Unable to verify last doses taken.

## 2017-10-11 NOTE — ASSESSMENT & PLAN NOTE
- This is sepsis (without associated acute organ dysfunction). Source likely respiratory. I do not think she has UTI, UA was very unimpressive.   - continue IV ceftriaxone, will add azithromycin. Check procalcitonin. Follow cultures. Will get CTA chest to r/o consolidation.   - continue IVF, with PRN bolus per sepsis protocol. Will do serial lactate monitoring.   - close hemodynamic monitoring on telemetry.

## 2017-10-12 VITALS
WEIGHT: 112.88 LBS | OXYGEN SATURATION: 95 % | RESPIRATION RATE: 16 BRPM | HEIGHT: 63 IN | HEART RATE: 64 BPM | SYSTOLIC BLOOD PRESSURE: 147 MMHG | TEMPERATURE: 98 F | DIASTOLIC BLOOD PRESSURE: 74 MMHG | BODY MASS INDEX: 20 KG/M2

## 2017-10-12 PROBLEM — G93.40 ACUTE ENCEPHALOPATHY: Status: RESOLVED | Noted: 2017-10-10 | Resolved: 2017-10-12

## 2017-10-12 PROBLEM — R79.89 ELEVATED TROPONIN: Status: RESOLVED | Noted: 2017-10-10 | Resolved: 2017-10-12

## 2017-10-12 PROBLEM — E86.0 DEHYDRATION: Status: RESOLVED | Noted: 2017-10-10 | Resolved: 2017-10-12

## 2017-10-12 PROBLEM — E87.6 HYPOKALEMIA: Status: RESOLVED | Noted: 2017-10-11 | Resolved: 2017-10-12

## 2017-10-12 PROBLEM — A41.9 SEPSIS (HCC): Status: RESOLVED | Noted: 2017-10-10 | Resolved: 2017-10-12

## 2017-10-12 PROBLEM — E83.51 HYPOCALCEMIA: Status: RESOLVED | Noted: 2017-10-11 | Resolved: 2017-10-12

## 2017-10-12 PROBLEM — E83.42 HYPOMAGNESEMIA: Status: RESOLVED | Noted: 2017-10-11 | Resolved: 2017-10-12

## 2017-10-12 PROBLEM — J18.9 PNEUMONIA: Status: RESOLVED | Noted: 2017-10-10 | Resolved: 2017-10-12

## 2017-10-12 LAB
ANION GAP SERPL CALC-SCNC: 5 MMOL/L (ref 0–11.9)
BACTERIA UR CULT: NORMAL
BASOPHILS # BLD AUTO: 0.3 % (ref 0–1.8)
BASOPHILS # BLD: 0.02 K/UL (ref 0–0.12)
BUN SERPL-MCNC: 16 MG/DL (ref 8–22)
C DIFF DNA SPEC QL NAA+PROBE: NEGATIVE
C DIFF TOX GENS STL QL NAA+PROBE: NEGATIVE
CALCIUM SERPL-MCNC: 7.6 MG/DL (ref 8.5–10.5)
CHLORIDE SERPL-SCNC: 113 MMOL/L (ref 96–112)
CO2 SERPL-SCNC: 23 MMOL/L (ref 20–33)
CREAT SERPL-MCNC: 0.61 MG/DL (ref 0.5–1.4)
EOSINOPHIL # BLD AUTO: 0.06 K/UL (ref 0–0.51)
EOSINOPHIL NFR BLD: 1 % (ref 0–6.9)
ERYTHROCYTE [DISTWIDTH] IN BLOOD BY AUTOMATED COUNT: 46.5 FL (ref 35.9–50)
GFR SERPL CREATININE-BSD FRML MDRD: >60 ML/MIN/1.73 M 2
GLUCOSE SERPL-MCNC: 115 MG/DL (ref 65–99)
HCT VFR BLD AUTO: 31.7 % (ref 37–47)
HGB BLD-MCNC: 10.5 G/DL (ref 12–16)
IMM GRANULOCYTES # BLD AUTO: 0.03 K/UL (ref 0–0.11)
IMM GRANULOCYTES NFR BLD AUTO: 0.5 % (ref 0–0.9)
LACTATE BLD-SCNC: 1 MMOL/L (ref 0.5–2)
LACTATE BLD-SCNC: 1.2 MMOL/L (ref 0.5–2)
LACTATE BLD-SCNC: 1.9 MMOL/L (ref 0.5–2)
LYMPHOCYTES # BLD AUTO: 1.01 K/UL (ref 1–4.8)
LYMPHOCYTES NFR BLD: 17.5 % (ref 22–41)
MCH RBC QN AUTO: 33.7 PG (ref 27–33)
MCHC RBC AUTO-ENTMCNC: 33.1 G/DL (ref 33.6–35)
MCV RBC AUTO: 101.6 FL (ref 81.4–97.8)
MONOCYTES # BLD AUTO: 0.54 K/UL (ref 0–0.85)
MONOCYTES NFR BLD AUTO: 9.4 % (ref 0–13.4)
NEUTROPHILS # BLD AUTO: 4.1 K/UL (ref 2–7.15)
NEUTROPHILS NFR BLD: 71.3 % (ref 44–72)
NRBC # BLD AUTO: 0 K/UL
NRBC BLD AUTO-RTO: 0 /100 WBC
PLATELET # BLD AUTO: 164 K/UL (ref 164–446)
PMV BLD AUTO: 9.7 FL (ref 9–12.9)
POTASSIUM SERPL-SCNC: 3.7 MMOL/L (ref 3.6–5.5)
PROCALCITONIN SERPL-MCNC: 0.05 NG/ML
RBC # BLD AUTO: 3.12 M/UL (ref 4.2–5.4)
SIGNIFICANT IND 70042: NORMAL
SITE SITE: NORMAL
SODIUM SERPL-SCNC: 141 MMOL/L (ref 135–145)
SOURCE SOURCE: NORMAL
WBC # BLD AUTO: 5.8 K/UL (ref 4.8–10.8)

## 2017-10-12 PROCEDURE — 97535 SELF CARE MNGMENT TRAINING: CPT

## 2017-10-12 PROCEDURE — 83605 ASSAY OF LACTIC ACID: CPT | Mod: 91

## 2017-10-12 PROCEDURE — 80048 BASIC METABOLIC PNL TOTAL CA: CPT

## 2017-10-12 PROCEDURE — G8978 MOBILITY CURRENT STATUS: HCPCS | Mod: CI

## 2017-10-12 PROCEDURE — G8979 MOBILITY GOAL STATUS: HCPCS | Mod: CI

## 2017-10-12 PROCEDURE — 84145 PROCALCITONIN (PCT): CPT

## 2017-10-12 PROCEDURE — 700105 HCHG RX REV CODE 258: Performed by: INTERNAL MEDICINE

## 2017-10-12 PROCEDURE — 99239 HOSP IP/OBS DSCHRG MGMT >30: CPT | Performed by: INTERNAL MEDICINE

## 2017-10-12 PROCEDURE — 97165 OT EVAL LOW COMPLEX 30 MIN: CPT

## 2017-10-12 PROCEDURE — 36415 COLL VENOUS BLD VENIPUNCTURE: CPT

## 2017-10-12 PROCEDURE — G8988 SELF CARE GOAL STATUS: HCPCS | Mod: CI

## 2017-10-12 PROCEDURE — G8987 SELF CARE CURRENT STATUS: HCPCS | Mod: CJ

## 2017-10-12 PROCEDURE — G8980 MOBILITY D/C STATUS: HCPCS | Mod: CI

## 2017-10-12 PROCEDURE — 700101 HCHG RX REV CODE 250: Performed by: INTERNAL MEDICINE

## 2017-10-12 PROCEDURE — 700111 HCHG RX REV CODE 636 W/ 250 OVERRIDE (IP): Performed by: INTERNAL MEDICINE

## 2017-10-12 PROCEDURE — 700102 HCHG RX REV CODE 250 W/ 637 OVERRIDE(OP): Performed by: INTERNAL MEDICINE

## 2017-10-12 PROCEDURE — 85025 COMPLETE CBC W/AUTO DIFF WBC: CPT

## 2017-10-12 PROCEDURE — 97162 PT EVAL MOD COMPLEX 30 MIN: CPT

## 2017-10-12 PROCEDURE — A9270 NON-COVERED ITEM OR SERVICE: HCPCS | Performed by: INTERNAL MEDICINE

## 2017-10-12 RX ORDER — L. ACIDOPHILUS/L.BULGARICUS 100MM CELL
1 GRANULES IN PACKET (EA) ORAL
Qty: 90 PACKET | Refills: 3 | Status: ON HOLD | OUTPATIENT
Start: 2017-10-12 | End: 2017-10-23

## 2017-10-12 RX ORDER — DIPHENOXYLATE HYDROCHLORIDE AND ATROPINE SULFATE 2.5; .025 MG/1; MG/1
1 TABLET ORAL 4 TIMES DAILY PRN
Status: DISCONTINUED | OUTPATIENT
Start: 2017-10-12 | End: 2017-10-12 | Stop reason: HOSPADM

## 2017-10-12 RX ORDER — POTASSIUM CHLORIDE 20 MEQ/1
20 TABLET, EXTENDED RELEASE ORAL DAILY
Qty: 30 TAB | Refills: 2 | Status: SHIPPED | OUTPATIENT
Start: 2017-10-12

## 2017-10-12 RX ADMIN — LACTOBACILLUS ACIDOPHILUS / LACTOBACILLUS BULGARICUS 1 PACKET: 100 MILLION CFU STRENGTH GRANULES at 17:10

## 2017-10-12 RX ADMIN — PAROXETINE HYDROCHLORIDE 20 MG: 10 TABLET, FILM COATED ORAL at 08:30

## 2017-10-12 RX ADMIN — LACTOBACILLUS ACIDOPHILUS / LACTOBACILLUS BULGARICUS 1 PACKET: 100 MILLION CFU STRENGTH GRANULES at 11:41

## 2017-10-12 RX ADMIN — LACTOBACILLUS ACIDOPHILUS / LACTOBACILLUS BULGARICUS 1 PACKET: 100 MILLION CFU STRENGTH GRANULES at 08:31

## 2017-10-12 RX ADMIN — AZITHROMYCIN FOR INJECTION INJECTION, POWDER, LYOPHILIZED, FOR SOLUTION 500 MG: 500 INJECTION INTRAVENOUS at 09:27

## 2017-10-12 RX ADMIN — ENOXAPARIN SODIUM 40 MG: 100 INJECTION SUBCUTANEOUS at 08:29

## 2017-10-12 RX ADMIN — ASPIRIN 325 MG: 325 TABLET, COATED ORAL at 08:30

## 2017-10-12 RX ADMIN — POTASSIUM CHLORIDE AND SODIUM CHLORIDE: 900; 150 INJECTION, SOLUTION INTRAVENOUS at 09:30

## 2017-10-12 RX ADMIN — POTASSIUM CHLORIDE AND SODIUM CHLORIDE: 900; 150 INJECTION, SOLUTION INTRAVENOUS at 00:44

## 2017-10-12 RX ADMIN — CEFTRIAXONE 1 G: 1 INJECTION, SOLUTION INTRAVENOUS at 08:31

## 2017-10-12 RX ADMIN — PROPRANOLOL HYDROCHLORIDE 10 MG: 10 TABLET ORAL at 08:30

## 2017-10-12 RX ADMIN — VITAMIN D, TAB 1000IU (100/BT) 1000 UNITS: 25 TAB at 08:31

## 2017-10-12 RX ADMIN — MAGNESIUM GLUCONATE 500 MG ORAL TABLET 400 MG: 500 TABLET ORAL at 08:30

## 2017-10-12 ASSESSMENT — GAIT ASSESSMENTS
ASSISTIVE DEVICE: FRONT WHEEL WALKER
DISTANCE (FEET): 200
GAIT LEVEL OF ASSIST: STAND BY ASSIST

## 2017-10-12 ASSESSMENT — ACTIVITIES OF DAILY LIVING (ADL): TOILETING: INDEPENDENT

## 2017-10-12 ASSESSMENT — COGNITIVE AND FUNCTIONAL STATUS - GENERAL
DRESSING REGULAR LOWER BODY CLOTHING: A LITTLE
STANDING UP FROM CHAIR USING ARMS: A LITTLE
SUGGESTED CMS G CODE MODIFIER DAILY ACTIVITY: CJ
TOILETING: A LITTLE
TURNING FROM BACK TO SIDE WHILE IN FLAT BAD: A LITTLE
HELP NEEDED FOR BATHING: A LITTLE
DAILY ACTIVITIY SCORE: 21
MOVING TO AND FROM BED TO CHAIR: A LITTLE
SUGGESTED CMS G CODE MODIFIER MOBILITY: CK
MOVING FROM LYING ON BACK TO SITTING ON SIDE OF FLAT BED: A LITTLE
MOBILITY SCORE: 18
CLIMB 3 TO 5 STEPS WITH RAILING: A LITTLE
WALKING IN HOSPITAL ROOM: A LITTLE

## 2017-10-12 ASSESSMENT — PAIN SCALES - GENERAL
PAINLEVEL_OUTOF10: 0

## 2017-10-12 ASSESSMENT — LIFESTYLE VARIABLES: ALCOHOL_USE: NO

## 2017-10-12 NOTE — PROGRESS NOTES
Pt lying in bed. Bedside shift report completed, POC discussed with dayshift RN. Pt verbalizes understanding of POC. Bed low and locked, call light in reach. No needs at this time.

## 2017-10-12 NOTE — THERAPY
"Occupational Therapy Evaluation completed.   Functional Status:  Min/CGA with ADLs and txfs  Plan of Care: Will benefit from Occupational Therapy 3 times per week  Discharge Recommendations:  Equipment: Will Continue to Assess for Equipment Needs. Post-acute therapy Discharge to a transitional care facility for continued skilled therapy services. and Discharge to home with outpatient or home health for additional skilled therapy services.    See \"Rehab Therapy-Acute\" Patient Summary Report for complete documentation.    "

## 2017-10-12 NOTE — DISCHARGE PLANNING
Transitional Care Navigator:    Met with the patient at the bedside regarding discharge needs.  Pt has been recommended to receive home health services.  Pt is agreeable; choice form completed and faxed.   TA Mejia aware; IMM delivered.

## 2017-10-12 NOTE — FACE TO FACE
Face to Face Note  -  Durable Medical Equipment    Willian May M.D. - NPI: [id number]  I certify that this patient is under my care and that they have had a durable medical equipment(DME)face to face encounter by myself that meets the physician DME face-to-face encounter requirements with this patient on:    Date of encounter:   Patient:                    MRN:                       YOB: 2017  Marifer Eldridge  [id number]  4/22/1931     The encounter with the patient was in whole, or in part, for the following medical condition, which is the primary reason for durable medical equipment:  Other - Debility, dehydration, bronchitis    I certify that, based on my findings, the following durable medical equipment is medically necessary:  Walkers.      My Clinical findings support the need for the above equipment due to:  Abnormal Gait    Supporting Symptoms:       ------------------------------------------------------------------------------------------------------------------    Face to Face Supporting Documentation - Home Health    The encounter with this patient was in whole or in part the primary reason for home health admission.    Date of encounter:   Patient:                    MRN:                       YOB: 2017  Marifer Eldridge  [id number]  4/22/1931     Home health to see patient for:  Skilled Nursing care for assessment, interventions & education, Physical Therapy evaluation and treatment and Occupational therapy evaluation and treatment    Skilled need for:  Comment: debility, dehydration, bronchitis    Skilled nursing interventions to include:  Comment:   Skilled nursing care for assessment, intervention and education.       Homebound evidenced status by:  Need the aid of supportive devices such as crutches, canes, wheelchairs or walkers or Needs the assistance of another person in order to leave the home. Leaving home must require a considerable and taxing  effort. There must exist a normal inability to leave the home.    Community Physician to provide follow up care: Angelito Thomas M.D.     Optional Interventions    Wound information & treatment:    Home Infusion Therapy orders:    Line/Drain/Airway:    I certify the face to face encounter for this home care referral meets the CMS requirements and the encounter/clinical assessment with the patient was, in whole, or in part, for the medical condition(s) listed above, which is the primary reason for home health care. Based on my clinical findings: the service(s) are medically necessary, support the need for home health care, and the homebound criteria are met.  I certify that this patient has had a face to face encounter by myself.  Willian May M.D. - NPI: [id number]    *Debility, frailty and advanced age in the absence of an acute deterioration or exacerbation of a condition do not qualify a patient for home health.

## 2017-10-12 NOTE — DISCHARGE INSTRUCTIONS
Discharge Instructions    Discharged to home by car with relative. Discharged via wheelchair, hospital escort: Yes.  Special equipment needed: Oxygen    Be sure to schedule a follow-up appointment with your primary care doctor or any specialists as instructed.     Discharge Plan:   Diet Plan: Discussed  Activity Level: Discussed  Confirmed Follow up Appointment: Patient to Call and Schedule Appointment  Confirmed Symptoms Management: Discussed  Medication Reconciliation Updated: Yes  Influenza Vaccine Indication: Indicated: 65 years and older  Influenza Vaccine Given - only chart on this line when given: Influenza Vaccine Given (See MAR)    I understand that a diet low in cholesterol, fat, and sodium is recommended for good health. Unless I have been given specific instructions below for another diet, I accept this instruction as my diet prescription.   Other diet: low sodium, low cholesterol    Special Instructions: Sepsis, Adult  Sepsis is a serious infection of your blood or tissues that affects your whole body. The infection that causes sepsis may be bacterial, viral, fungal, or parasitic. Sepsis may be life threatening. Sepsis can cause your blood pressure to drop. This may result in shock. Shock causes your central nervous system and your organs to stop working correctly.   RISK FACTORS  Sepsis can happen in anyone, but it is more likely to happen in people who have weakened immune systems.  SIGNS AND SYMPTOMS   Symptoms of sepsis can include:  · Fever or low body temperature (hypothermia).  · Rapid breathing (hyperventilation).  · Chills.  · Rapid heartbeat (tachycardia).  · Confusion or light-headedness.  · Trouble breathing.  · Urinating much less than usual.  · Cool, clammy skin or red, flushed skin.  · Other problems with the heart, kidneys, or brain.  DIAGNOSIS   Your health care provider will likely do tests to look for an infection, to see if the infection has spread to your blood, and to see how  serious your condition is. Tests can include:  · Blood tests, including cultures of your blood.  · Cultures of other fluids from your body, such as:  ¨ Urine.  ¨ Pus from wounds.  ¨ Mucus coughed up from your lungs.  · Urine tests other than cultures.  · X-ray exams or other imaging tests.  TREATMENT   Treatment will begin with elimination of the source of infection. If your sepsis is likely caused by a bacterial or fungal infection, you will be given antibiotic or antifungal medicines.  You may also receive:  · Oxygen.  · Fluids through an IV tube.  · Medicines to increase your blood pressure.  · A machine to clean your blood (dialysis) if your kidneys fail.  · A machine to help you breathe if your lungs fail.  SEEK IMMEDIATE MEDICAL CARE IF:  You get an infection or develop any of the signs and symptoms of sepsis after surgery or a hospitalization.     This information is not intended to replace advice given to you by your health care provider. Make sure you discuss any questions you have with your health care provider.     Document Released: 09/15/2004 Document Revised: 05/03/2016 Document Reviewed: 08/25/2014  [employer] Interactive Patient Education ©2016 [employer]      · Is patient discharged on Warfarin / Coumadin?   No     · Is patient Post Blood Transfusion?  No    Depression / Suicide Risk    As you are discharged from this [facility] facility, it is important to learn how to keep safe from harming yourself.    Recognize the warning signs:  · Abrupt changes in personality, positive or negative- including increase in energy   · Giving away possessions  · Change in eating patterns- significant weight changes-  positive or negative  · Change in sleeping patterns- unable to sleep or sleeping all the time   · Unwillingness or inability to communicate  · Depression  · Unusual sadness, discouragement and loneliness  · Talk of wanting to die  · Neglect of personal appearance   · Rebelliousness- reckless  behavior  · Withdrawal from people/activities they love  · Confusion- inability to concentrate     If you or a loved one observes any of these behaviors or has concerns about self-harm, here's what you can do:  · Talk about it- your feelings and reasons for harming yourself  · Remove any means that you might use to hurt yourself (examples: pills, rope, extension cords, firearm)  · Get professional help from the community (Mental Health, Substance Abuse, psychological counseling)  · Do not be alone:Call your Safe Contact- someone whom you trust who will be there for you.  · Call your local CRISIS HOTLINE 565-5381 or 707-281-7791  · Call your local Children's Mobile Crisis Response Team [place] (582) 295-4143 or www.[employer]DoGlobal  · Call the toll free National Suicide Prevention Hotlines   · National Suicide Prevention Lifeline 641-823-AZLK (4131)  · National Hope Line Network 800-SUICIDE (664-8001)

## 2017-10-12 NOTE — DISCHARGE SUMMARY
HOSPITAL MEDICINE DISCHARGE SUMMARY     Name: Marifer Eldridge  MRN: [id number]  : 1931  Admit Date: 10/10/2017  Discharge Date: 10/12/2017  Attending Provider: Willian May M.D.  Admitting Provider: Benjamin Beyer M.D.  Primary Care Physician: Angelito Thomas M.D.     DISCHARGE DIAGNOSES:   Active Problems:    Lung nodule POA: Yes    HTN (hypertension) POA: Yes  Resolved Problems:    Pneumonia POA: Yes    Sepsis (CMS-HCC) POA: Yes    Acute encephalopathy due to dehydration, pneumonia, NOT related to sepsis POA: Yes    Dehydration POA: Yes    Elevated troponin, likely demand ischemia POA: Yes    Hypokalemia POA: Yes    Hypocalcemia POA: Yes    Hypomagnesemia POA: Yes        SUMMARY OF EVENTS LEADING TO ADMISSION:   This is an 86-year-old female with   hypertension, reactive airway disease and arthritis, who was admitted on   10/10/2017 with altered mentation, after being found unconscious by her family   and was confused on awakening.  She admits to not drinking or eating much in   the last several days.    For further details of history of present illness, past medical, social,   family histories, allergies and medications, please refer to admission H and P   by Dr. Benjamin Beyer on 10/10/2017.    HOSPITAL COURSE:  The patient was admitted to the hospitalist service after   being initially evaluated in the emergency department, where she was noted to   be very dehydrated, with initial labs notable for lactate of 2.5 and troponin   of 0.26.  She did not have any leukocytosis.  She did have low potassium of   3.0, with creatinine within normal limits.  Her BNP was low.  Her urinalysis   was unimpressive with 2-5 wbc, with trace ketones, negative nitrite or   leukocyte esterase.  Chest x-ray did show mild bronchial wall thickening, with   tiny right mid lung and left upper lobe pulmonary nodule.  She was felt to   have sepsis, presumed to be due to respiratory source, and she was  started on   IV ceftriaxone and azithromycin, along with IV fluids.    Her lactate normalized with IV fluids, and her blood culture and urine   cultures remained negative.  Chest CT angiogram was obtained, which showed no   PE, with minimal consolidation on the right lower lobe with stable 3.1 mm   noncalcified nodule in the right middle lobe and stable right hilar   adenopathy.  Echocardiogram was also obtained, which showed ejection fraction   of 75% with moderate concentric LVH, and grade I diastolic dysfunction with no   aortic stenosis and only with mild tricuspid regurgitation.  She did have   diarrhea while she is inhouse, and C. diff was sent, which was negative.  She   was started on probiotics, along with p.r.n. Lomotil, which improved her   diarrhea.    She was continued on antibiotics, and completed 3 days course.  Procalcitonin   was checked and it was low, and thus decision to stop the antibiotics were   made.  She remained hemodynamically stable and afebrile, and her sepsis   resolved.    She was evaluated by PT and OT, and after walking her around the unit, they   recommended home healthcare services, which was arranged, along with free   front wheeled walker.  They also noticed chronic right foot drop with which   the patient stated that has been there for several years now, and they   recommended prosthetist followup for AFO fitting, and prescription was given   to the patient for this.    With her clinical improvement, she was deemed ready to be discharged from the   hospital as she did not have any further inpatient needs.  Patient felt   comfortable going home.    The discharge plan was discussed with the patient, with which she was   agreeable to.    The patient was subsequently sent home in improved and stable condition.    DISCHARGE DISPOSITION: recovered as expected.      FOLLOW-UP ISSUES:   1. Dehydration - adequately rehydrated while in-house. Counseled extensively on keeping up with  adequate oral fluid intake at home.       2. Hypokalemia and hypomagnesemia - continue PO kdur and magnesium oxide supplements.      3. Lung nodule -  low risk. Never a smoker. Will need continued outpatient surveillance of lung nodule per Fleischner society recommendations.      CHANGES IN MANAGEMENT OF CHRONIC CONDITION: As above.      PENDING TESTS: none     FOLLOW-UP TESTS ORDERED POST DISCHARGE: none     DISCHARGE MEDICATIONS:   Medication Sig Start Date End Date   lactobacillus granules (LACTINEX/FLORANEX) Pack Take 1 Packet by mouth 3 times a day, with meals. 10/12/17     magnesium oxide (MAG-OX) 400 (241.3 Mg) MG Tab tablet Take 1 Tab by mouth every day. 10/12/17     potassium chloride SA (KDUR) 20 MEQ Tab CR Take 1 Tab by mouth every day. 10/12/17     oxybutynin (DITROPAN) 5 MG Tab Take 5 mg by mouth 4 times a day as needed.       hydrocodone-acetaminophen (NORCO) 5-325 MG Tab per tablet Take 1-2 Tabs by mouth every four hours as needed.       triamterene/hctz (MAXZIDE-25/DYAZIDE) 37.5-25 MG Cap TAKE 1 CAPSULE EVERY       MORNING 8/23/17     paroxetine (PAXIL) 20 MG Tab TAKE 1 TABLET DAILY WITH   FOOD 8/23/17     diphenoxylate-atropine (LOMOTIL) 2.5-0.025 MG Tab TAKE  (1)  TABLET  FOUR TIMES DAILY AS NEEDED FOR DIARRHEA. 8/23/17     propranolol (INDERAL) 10 MG Tab TAKE (1) TABLET BY MOUTH TWICE DAILY. 6/18/17     alendronate (FOSAMAX) 70 MG Tab TAKE 1 TABLET EVERY 7 DAYS 1/31/17     metaxalone 400 MG Tab Take 400-800 mg by mouth 3 times a day as needed for Muscle Spasms. 10/25/16     aspirin EC (ECOTRIN) 325 MG Tablet Delayed Response Take 1 Tab by mouth every day. 4/4/16     latanoprost (XALATAN) 0.005 % SOLN Place 1 Drop in right eye every evening.       vitamin D (CHOLECALCIFEROL) 1000 UNIT TABS Take 1 Tab by mouth every day. 2/4/14              FOLLOW-UP APPOINTMENTS:   No follow-up provider specified.     1. PCP - 1-2 weeks.      For further details on discharge medications, patient education, diet,  and activity, please refer to electronic copy of discharge instructions.         TIME SPENT: 45 minutes, with greater than 50% of the time spent on face-to-face encounter, addressing medical issues, coordination of care, counseling, discharge planning, medication reconciliation, and documentation.       ____________________________________     MAMADOU Simon / JEAN PAUL    DD:  10/12/2017 11:53:08  DT:  10/12/2017 12:30:34    D#:  [id number]  Job#:  [id number]

## 2017-10-12 NOTE — CARE PLAN
Problem: Venous Thromboembolism (VTW)/Deep Vein Thrombosis (DVT) Prevention:  Goal: Patient will participate in Venous Thrombosis (VTE)/Deep Vein Thrombosis (DVT)Prevention Measures  Outcome: PROGRESSING AS EXPECTED  Pt agreeable to VTE prophylaxis. Pt taking lovenox for prophylaxis    Problem: Respiratory:  Goal: Respiratory status will improve  Outcome: PROGRESSING AS EXPECTED  Pt on home oxygen requirement. Denies SOB or dyspnea

## 2017-10-12 NOTE — CARE PLAN
Problem: Nutritional:  Goal: Achieve adequate nutritional intake  Patient will consume 50% of meals   Outcome: MET Date Met: 10/12/17

## 2017-10-12 NOTE — PROGRESS NOTES
Ivone Silva Fall Risk Assessment:     Last Known Fall: Within the last six months  Mobility: Immobilized/requires assist of one person  Medications: No meds  Mental Status/LOC/Awareness: Memory loss/confusion and requires reorienting  Toileting Needs: Use of assistive device (Bedside commode, bedpan, urinal)  Volume/Electrolyte Status: Use of IV fluids/tube feeds  Communication/Sensory: Visual (Glasses)/hearing deficit  Behavior: Impulsiveness  Ivone Silva Fall Risk Total: 19  Fall Risk Level: HIGH RISK    Universal Fall Precautions:  call light/belongings in reach, bed in low position and locked, wheelchairs and assistive devices out of sight, siderails up x 2, use non-slip footwear, adequate lighting, clutter free and spill free environment, educate on level of risk, educate to call for assistance    Fall Risk Level Interventions:     TRIAL (TELE 8, NEURO, MED AMILCAR 5) High Fall Risk Interventions  Place yellow fall risk ID band on patient: verified  Provide patient/family education based on risk assessment: completed  Educate patient/family to call staff for assistance when getting out of bed: completed  Place fall precaution signage outside patient door: verified  Place patient in room close to nursing station: completed  Utilize bed/chair fall alarm: completed  Notify charge of high risk for huddle: completed    Patient Specific Interventions:     Medication: review medications with patient and family, assess for medications that can be discontinued or dosage decreased and limit combination of prn medications  Mental Status/LOC/Awareness: reorient patient, reinforce falls education, encourage family to stay with patient, check on patient hourly, utilize bed/chair fall alarm, reinforce the use of call light and provide activity  Toileting: provide frquent toileting, monitor intake and output/use of appropriate interventions, instruct patient/family on the use of grab bars and instruct patient/family on the  need to call for assistance when toileting  Volume/Electrolyte Status: ensure patient remains hydrated, administer medications as ordered for nausea and vomiting, monitor abnormal lab values and ensure IV fluids are appropriate  Communication/Sensory: update plan of care on whiteboard, ensure proper positioning when transferrng/ambulating, ensure patient has glasses/contacts and hearing aids/dentures and for visually impaired patients orient to their room surrounding and do not change their surroundings  Behavioral: encourage patient to voice feelings, engage patient in daily activities, administer medication as ordered and instruct/reinforce fall program rationale  Mobility: schedule physical activity throughout the day, provide comfort measures during transport, dangle prior to standing, utilize bed/chair fall alarm, ensure bed is locked and in lowest position, provide appropriate assistive device and instruct patient to exit bed on their strongest side

## 2017-10-12 NOTE — CARE PLAN
Problem: Safety  Goal: Will remain free from injury  Outcome: PROGRESSING AS EXPECTED      Problem: Bowel/Gastric:  Goal: Normal bowel function is maintained or improved  Outcome: PROGRESSING AS EXPECTED      Problem: Fluid Volume:  Goal: Will maintain balanced intake and output  Outcome: PROGRESSING AS EXPECTED      Problem: Respiratory:  Goal: Respiratory status will improve  Outcome: PROGRESSING AS EXPECTED

## 2017-10-12 NOTE — DISCHARGE PLANNING
Received choice form from WALLY Veronica for HH. Referral sent to [facility] at 1246.

## 2017-10-12 NOTE — PROGRESS NOTES
Assumed care of patient bedside report received from Nelson updated on POC, call light within reach and fall precautions in place. Bed locked and in lowest position. Patient instructed to call for assistance before getting out of bed. All questions answered, no other needs at this time.

## 2017-10-12 NOTE — CARE PLAN
Problem: Safety  Goal: Will remain free from injury  Outcome: PROGRESSING AS EXPECTED  Education provided to patient on using the call bell and not to get up with out assistance.      Bed left in low position.  Bed and Chair alarms used.  Hourly rounding completed.  Personal Items left within reach.    Pt educated on the importance of CALL BEFORE YOU FALL.  Call bell and all necessary items in reach.  Hourly rounding focusing on the 3Ps completed by this RN with the assistance of Kerri DODD      Goal: Will remain free from falls  Outcome: PROGRESSING AS EXPECTED  Ivone Silva Fall Risk Assessment:     Last Known Fall:    Mobility: Immobilized/requires assist of one person  Medications: No meds  Mental Status/LOC/Awareness: Oriented to person and place, Memory loss/confusion and requires reorienting  Toileting Needs: Use of assistive device (Bedside commode, bedpan, urinal)  Volume/Electrolyte Status: Use of IV fluids/tube feeds  Communication/Sensory: No deficits  Behavior: Impulsiveness  Ivone Silva Fall Risk Total:    Fall Risk Level:      Universal Fall Precautions:       Fall Risk Level Interventions:          Patient Specific Interventions:     Medication: review medications with patient and family, assess for medications that can be discontinued or dosage decreased and provide patients who received diuretics/laxatives frequent toileting  Mental Status/LOC/Awareness: reorient patient, reinforce falls education, check on patient hourly, utilize bed/chair fall alarm, reinforce the use of call light and provide activity  Toileting: provide frquent toileting, monitor intake and output/use of appropriate interventions, instruct patient/family on the need to call for assistance when toileting and do not leave patient unattended in bathroom/refer to toileting scripting  Volume/Electrolyte Status: ensure patient remains hydrated, advance diet as tolerated, monitor abnormal lab values and ensure IV fluids are  appropriate  Communication/Sensory: update plan of care on whiteboard and provide communication alternatives/  Behavioral: engage patient in daily activities, instruct/reinforce fall program rationale and use appropriate de-escalation techniques  Mobility: schedule physical activity throughout the day, dangle prior to standing, utilize bed/chair fall alarm and instruct patient to exit bed on their strongest side    Problem: Infection  Goal: Will remain free from infection  Outcome: PROGRESSING AS EXPECTED  Education provided on Antibiotics, Wound Care, Hand Washing and Labs as applicable to patient.

## 2017-10-12 NOTE — THERAPY
"Physical Therapy Evaluation completed.   Bed Mobility:  Supine to Sit:  (up chair)  Transfers: Sit to Stand: Contact Guard Assist  Gait: Level Of Assist: Stand by Assist with Front-Wheel Walker       Plan of Care: Patient with no further skilled PT needs in the acute care setting at this time  Discharge Recommendations: Equipment: Front-Wheel Walker. Post-acute therapy Discharge to home with outpatient or home health for additional skilled therapy services or post acute therapy stay, but pt does not want SNF, wants home.  Pt presents with unstable gait when using her cane, unsafe and at risk to fall. Pt shows R foot drop and reports 4 falls in last 6 months. Pt does not want to go to SNF but is agreeable to consistent use of FWW and having an assessment for AFO to control the R footdrop. Pt agrees to have brother stay overnight first night home. Needs home health for safety check at home. No further inpt PT needs.       See \"Rehab Therapy-Acute\" Patient Summary Report for complete documentation.     "

## 2017-10-12 NOTE — PROGRESS NOTES
Ivone Silva Fall Risk Assessment:     Last Known Fall: Within the last six months  Mobility: Immobilized/requires assist of one person  Medications: No meds  Mental Status/LOC/Awareness: Memory loss/confusion and requires reorienting  Toileting Needs: Use of assistive device (Bedside commode, bedpan, urinal)  Volume/Electrolyte Status: Use of IV fluids/tube feeds  Communication/Sensory: Visual (Glasses)/hearing deficit  Behavior: Impulsiveness  Ivone Silva Fall Risk Total: 19  Fall Risk Level: HIGH RISK    Universal Fall Precautions:  call light/belongings in reach, bed in low position and locked, siderails up x 2, wheelchairs and assistive devices out of sight, adequate lighting, use non-slip footwear, clutter free and spill free environment, educate on level of risk, educate to call for assistance    Fall Risk Level Interventions:     TRIAL (TELE 8, NEURO, MED AMILCAR 5) High Fall Risk Interventions  Place yellow fall risk ID band on patient: verified  Provide patient/family education based on risk assessment: completed  Educate patient/family to call staff for assistance when getting out of bed: completed  Place fall precaution signage outside patient door: verified  Place patient in room close to nursing station: completed  Utilize bed/chair fall alarm: completed  Notify charge of high risk for huddle: completed    Patient Specific Interventions:     Medication: review medications with patient and family  Mental Status/LOC/Awareness: reorient patient, reinforce falls education, check on patient hourly, utilize bed/chair fall alarm, reinforce the use of call light and provide activity  Toileting: provide frquent toileting, monitor intake and output/use of appropriate interventions, instruct patient/family on the need to call for assistance when toileting and do not leave patient unattended in bathroom/refer to toileting scripting  Volume/Electrolyte Status: ensure patient remains hydrated, advance diet as  tolerated, monitor abnormal lab values and ensure IV fluids are appropriate  Communication/Sensory: update plan of care on whiteboard and ensure proper positioning when transferrng/ambulating  Behavioral: encourage patient to voice feelings and administer medication as ordered  Mobility: dangle prior to standing, utilize bed/chair fall alarm, ensure bed is locked and in lowest position, provide appropriate assistive device and collaborate with doctor for possible PT/OT consult

## 2017-10-12 NOTE — PROGRESS NOTES
"Patient states she has a walker at home when asked about it. Declined new walker, she states \"mine is adjustable at home and I can set it to fit me.\" Ortho tech informed.   "

## 2017-10-13 NOTE — PROGRESS NOTES
Patient discharged home, IV and tele box removed, discharge instructions provided to patient and reviewed with them. All questions answered, patient provided copy of discharge instructions and instructed on when to F/U with MD. Patient wheeled off unit.

## 2017-10-13 NOTE — DISCHARGE PLANNING
Received notice from [facility] have declined as not provider with insurance.

## 2017-10-15 LAB
BACTERIA BLD CULT: NORMAL
BACTERIA BLD CULT: NORMAL
SIGNIFICANT IND 70042: NORMAL
SIGNIFICANT IND 70042: NORMAL
SITE SITE: NORMAL
SITE SITE: NORMAL
SOURCE SOURCE: NORMAL
SOURCE SOURCE: NORMAL

## 2017-10-18 PROCEDURE — 3E0234Z INTRODUCTION OF SERUM, TOXOID AND VACCINE INTO MUSCLE, PERCUTANEOUS APPROACH: ICD-10-PCS | Performed by: INTERNAL MEDICINE

## 2017-10-19 NOTE — ADDENDUM NOTE
Encounter addended by: Elizabeth L. Sawallisch on: 10/19/2017  1:24 PM<BR>    Actions taken: Pend clinical note

## 2017-10-19 NOTE — DOCUMENTATION QUERY
DOCUMENTATION QUERY    PROVIDERS: Please select “Cosign w/ note” to reply to query.    To better represent the severity of illness of your patient, please review the following information and exercise your independent professional judgment in responding to this query.     The patient was documented to have Sepsis POA and demand ischemia POA in the Progress Notes and DC Summary.  Per coding guidelines, the clinical indicator for severe sepsis is a major organ dysfunction/failure/shock DUE to Sepsis.      Based upon the clinical findings, risk factors, and treatment, can the relationship between these two conditions be further specified?    • Demand ischemia is related to sepsis (severe sepsis)  • Demand ischemia is not related to sepsis  • Other explanation of clinical findings (please document)  • Unable to determine        The medical record reflects the following:   Clinical Findings  Elevated Troponin   Treatment  IV antibiotics   ASA   Risk Factors     Location within medical record  Progress Notes     Thank you,   Elizabeth Sawallisch

## 2017-10-20 NOTE — ADDENDUM NOTE
Encounter addended by: Elizabeth L. Sawallisch on: 10/20/2017  3:17 PM<BR>    Actions taken: Sign clinical note

## 2017-10-23 ENCOUNTER — RESOLUTE PROFESSIONAL BILLING HOSPITAL PROF FEE (OUTPATIENT)
Dept: HOSPITALIST | Facility: MEDICAL CENTER | Age: 82
End: 2017-10-23
Payer: COMMERCIAL

## 2017-10-23 ENCOUNTER — HOSPITAL ENCOUNTER (OUTPATIENT)
Facility: MEDICAL CENTER | Age: 82
End: 2017-10-23
Payer: COMMERCIAL

## 2017-10-23 ENCOUNTER — HOSPITAL ENCOUNTER (INPATIENT)
Facility: MEDICAL CENTER | Age: 82
LOS: 7 days | DRG: 066 | End: 2017-10-30
Attending: HOSPITALIST | Admitting: HOSPITALIST
Payer: COMMERCIAL

## 2017-10-23 VITALS — HEIGHT: 63 IN | BODY MASS INDEX: 19.84 KG/M2 | WEIGHT: 111.99 LBS

## 2017-10-23 DIAGNOSIS — I63.9 CEREBROVASCULAR ACCIDENT (CVA), UNSPECIFIED MECHANISM (HCC): ICD-10-CM

## 2017-10-23 DIAGNOSIS — G62.9 PERIPHERAL POLYNEUROPATHY: ICD-10-CM

## 2017-10-23 DIAGNOSIS — R47.01 EXPRESSIVE APHASIA: ICD-10-CM

## 2017-10-23 DIAGNOSIS — I10 ESSENTIAL HYPERTENSION: ICD-10-CM

## 2017-10-23 PROCEDURE — 99223 1ST HOSP IP/OBS HIGH 75: CPT | Performed by: HOSPITALIST

## 2017-10-23 PROCEDURE — 770020 HCHG ROOM/CARE - TELE (206)

## 2017-10-23 PROCEDURE — 770006 HCHG ROOM/CARE - MED/SURG/GYN SEMI*

## 2017-10-23 RX ORDER — METAXALONE 800 MG/1
400-800 TABLET ORAL 3 TIMES DAILY PRN
Status: DISCONTINUED | OUTPATIENT
Start: 2017-10-23 | End: 2017-10-30 | Stop reason: HOSPADM

## 2017-10-23 RX ORDER — OXYBUTYNIN CHLORIDE 5 MG/1
5 TABLET ORAL 4 TIMES DAILY PRN
Status: DISCONTINUED | OUTPATIENT
Start: 2017-10-23 | End: 2017-10-30 | Stop reason: HOSPADM

## 2017-10-23 RX ORDER — LATANOPROST 50 UG/ML
1 SOLUTION/ DROPS OPHTHALMIC NIGHTLY
Status: DISCONTINUED | OUTPATIENT
Start: 2017-10-23 | End: 2017-10-30 | Stop reason: HOSPADM

## 2017-10-23 RX ORDER — ASPIRIN 300 MG/1
300 SUPPOSITORY RECTAL DAILY
Status: DISCONTINUED | OUTPATIENT
Start: 2017-10-24 | End: 2017-10-23

## 2017-10-23 RX ORDER — CLOPIDOGREL BISULFATE 75 MG/1
75 TABLET ORAL DAILY
Status: DISCONTINUED | OUTPATIENT
Start: 2017-10-24 | End: 2017-10-30 | Stop reason: HOSPADM

## 2017-10-23 RX ORDER — PAROXETINE HYDROCHLORIDE 20 MG/1
20 TABLET, FILM COATED ORAL DAILY
Status: DISCONTINUED | OUTPATIENT
Start: 2017-10-24 | End: 2017-10-30 | Stop reason: HOSPADM

## 2017-10-23 RX ORDER — ATORVASTATIN CALCIUM 40 MG/1
40 TABLET, FILM COATED ORAL EVERY EVENING
Status: DISCONTINUED | OUTPATIENT
Start: 2017-10-23 | End: 2017-10-30 | Stop reason: HOSPADM

## 2017-10-23 RX ORDER — POLYETHYLENE GLYCOL 3350 17 G/17G
1 POWDER, FOR SOLUTION ORAL
Status: DISCONTINUED | OUTPATIENT
Start: 2017-10-23 | End: 2017-10-29

## 2017-10-23 RX ORDER — ASPIRIN 325 MG
325 TABLET ORAL DAILY
Status: DISCONTINUED | OUTPATIENT
Start: 2017-10-24 | End: 2017-10-23

## 2017-10-23 RX ORDER — BISACODYL 10 MG
10 SUPPOSITORY, RECTAL RECTAL
Status: DISCONTINUED | OUTPATIENT
Start: 2017-10-23 | End: 2017-10-29

## 2017-10-23 RX ORDER — ASPIRIN 81 MG/1
324 TABLET, CHEWABLE ORAL DAILY
Status: DISCONTINUED | OUTPATIENT
Start: 2017-10-24 | End: 2017-10-23

## 2017-10-23 RX ORDER — AMOXICILLIN 250 MG
2 CAPSULE ORAL 2 TIMES DAILY
Status: DISCONTINUED | OUTPATIENT
Start: 2017-10-24 | End: 2017-10-29

## 2017-10-23 RX ORDER — SODIUM CHLORIDE 9 MG/ML
INJECTION, SOLUTION INTRAVENOUS CONTINUOUS
Status: DISCONTINUED | OUTPATIENT
Start: 2017-10-23 | End: 2017-10-24

## 2017-10-23 ASSESSMENT — COGNITIVE AND FUNCTIONAL STATUS - GENERAL
MOVING TO AND FROM BED TO CHAIR: A LITTLE
DRESSING REGULAR UPPER BODY CLOTHING: A LITTLE
STANDING UP FROM CHAIR USING ARMS: A LITTLE
TOILETING: A LITTLE
MOVING FROM LYING ON BACK TO SITTING ON SIDE OF FLAT BED: A LITTLE
SUGGESTED CMS G CODE MODIFIER MOBILITY: CK
WALKING IN HOSPITAL ROOM: A LITTLE
TURNING FROM BACK TO SIDE WHILE IN FLAT BAD: A LITTLE
DAILY ACTIVITIY SCORE: 20
CLIMB 3 TO 5 STEPS WITH RAILING: A LITTLE
MOBILITY SCORE: 18
DRESSING REGULAR LOWER BODY CLOTHING: A LITTLE
HELP NEEDED FOR BATHING: A LITTLE
SUGGESTED CMS G CODE MODIFIER DAILY ACTIVITY: CJ

## 2017-10-23 ASSESSMENT — LIFESTYLE VARIABLES
ALCOHOL_USE: NO
EVER_SMOKED: NEVER

## 2017-10-23 ASSESSMENT — PATIENT HEALTH QUESTIONNAIRE - PHQ9
1. LITTLE INTEREST OR PLEASURE IN DOING THINGS: NOT AT ALL
SUM OF ALL RESPONSES TO PHQ QUESTIONS 1-9: 0
2. FEELING DOWN, DEPRESSED, IRRITABLE, OR HOPELESS: NOT AT ALL
SUM OF ALL RESPONSES TO PHQ9 QUESTIONS 1 AND 2: 0

## 2017-10-24 ENCOUNTER — APPOINTMENT (OUTPATIENT)
Dept: RADIOLOGY | Facility: MEDICAL CENTER | Age: 82
DRG: 066 | End: 2017-10-24
Attending: HOSPITALIST
Payer: COMMERCIAL

## 2017-10-24 PROBLEM — I63.9 CVA (CEREBRAL VASCULAR ACCIDENT) (HCC): Status: ACTIVE | Noted: 2017-10-24

## 2017-10-24 PROBLEM — Z66 DNR (DO NOT RESUSCITATE): Status: ACTIVE | Noted: 2017-10-24

## 2017-10-24 LAB
ANION GAP SERPL CALC-SCNC: 6 MMOL/L (ref 0–11.9)
BASOPHILS # BLD AUTO: 0.6 % (ref 0–1.8)
BASOPHILS # BLD: 0.03 K/UL (ref 0–0.12)
BUN SERPL-MCNC: 15 MG/DL (ref 8–22)
CALCIUM SERPL-MCNC: 9 MG/DL (ref 8.5–10.5)
CHLORIDE SERPL-SCNC: 102 MMOL/L (ref 96–112)
CHOLEST SERPL-MCNC: 181 MG/DL (ref 100–199)
CO2 SERPL-SCNC: 29 MMOL/L (ref 20–33)
CREAT SERPL-MCNC: 0.55 MG/DL (ref 0.5–1.4)
EOSINOPHIL # BLD AUTO: 0.08 K/UL (ref 0–0.51)
EOSINOPHIL NFR BLD: 1.5 % (ref 0–6.9)
ERYTHROCYTE [DISTWIDTH] IN BLOOD BY AUTOMATED COUNT: 46.8 FL (ref 35.9–50)
EST. AVERAGE GLUCOSE BLD GHB EST-MCNC: 114 MG/DL
GFR SERPL CREATININE-BSD FRML MDRD: >60 ML/MIN/1.73 M 2
GLUCOSE SERPL-MCNC: 97 MG/DL (ref 65–99)
HBA1C MFR BLD: 5.6 % (ref 0–5.6)
HCT VFR BLD AUTO: 34.7 % (ref 37–47)
HDLC SERPL-MCNC: 87 MG/DL
HGB BLD-MCNC: 11.6 G/DL (ref 12–16)
IMM GRANULOCYTES # BLD AUTO: 0.02 K/UL (ref 0–0.11)
IMM GRANULOCYTES NFR BLD AUTO: 0.4 % (ref 0–0.9)
LDLC SERPL CALC-MCNC: 78 MG/DL
LYMPHOCYTES # BLD AUTO: 1.37 K/UL (ref 1–4.8)
LYMPHOCYTES NFR BLD: 25.2 % (ref 22–41)
MCH RBC QN AUTO: 34.1 PG (ref 27–33)
MCHC RBC AUTO-ENTMCNC: 33.4 G/DL (ref 33.6–35)
MCV RBC AUTO: 102.1 FL (ref 81.4–97.8)
MONOCYTES # BLD AUTO: 0.47 K/UL (ref 0–0.85)
MONOCYTES NFR BLD AUTO: 8.7 % (ref 0–13.4)
NEUTROPHILS # BLD AUTO: 3.46 K/UL (ref 2–7.15)
NEUTROPHILS NFR BLD: 63.6 % (ref 44–72)
NRBC # BLD AUTO: 0 K/UL
NRBC BLD AUTO-RTO: 0 /100 WBC
PLATELET # BLD AUTO: 245 K/UL (ref 164–446)
PMV BLD AUTO: 9.2 FL (ref 9–12.9)
POTASSIUM SERPL-SCNC: 3.9 MMOL/L (ref 3.6–5.5)
RBC # BLD AUTO: 3.4 M/UL (ref 4.2–5.4)
SODIUM SERPL-SCNC: 137 MMOL/L (ref 135–145)
TRIGL SERPL-MCNC: 81 MG/DL (ref 0–149)
WBC # BLD AUTO: 5.4 K/UL (ref 4.8–10.8)

## 2017-10-24 PROCEDURE — 96105 ASSESSMENT OF APHASIA: CPT

## 2017-10-24 PROCEDURE — 700105 HCHG RX REV CODE 258: Performed by: HOSPITALIST

## 2017-10-24 PROCEDURE — 80048 BASIC METABOLIC PNL TOTAL CA: CPT

## 2017-10-24 PROCEDURE — 36415 COLL VENOUS BLD VENIPUNCTURE: CPT

## 2017-10-24 PROCEDURE — 85025 COMPLETE CBC W/AUTO DIFF WBC: CPT

## 2017-10-24 PROCEDURE — 93880 EXTRACRANIAL BILAT STUDY: CPT | Mod: 26 | Performed by: SURGERY

## 2017-10-24 PROCEDURE — 700102 HCHG RX REV CODE 250 W/ 637 OVERRIDE(OP): Performed by: HOSPITALIST

## 2017-10-24 PROCEDURE — G9162 LANG EXPRESS CURRENT STATUS: HCPCS | Mod: CH

## 2017-10-24 PROCEDURE — A9270 NON-COVERED ITEM OR SERVICE: HCPCS | Performed by: HOSPITALIST

## 2017-10-24 PROCEDURE — G8978 MOBILITY CURRENT STATUS: HCPCS | Mod: CK

## 2017-10-24 PROCEDURE — 93880 EXTRACRANIAL BILAT STUDY: CPT

## 2017-10-24 PROCEDURE — G9163 LANG EXPRESS GOAL STATUS: HCPCS | Mod: CH

## 2017-10-24 PROCEDURE — 99233 SBSQ HOSP IP/OBS HIGH 50: CPT | Performed by: INTERNAL MEDICINE

## 2017-10-24 PROCEDURE — 70551 MRI BRAIN STEM W/O DYE: CPT

## 2017-10-24 PROCEDURE — G9164 LANG EXPRESS D/C STATUS: HCPCS | Mod: CH

## 2017-10-24 PROCEDURE — G8979 MOBILITY GOAL STATUS: HCPCS | Mod: CI

## 2017-10-24 PROCEDURE — 83036 HEMOGLOBIN GLYCOSYLATED A1C: CPT

## 2017-10-24 PROCEDURE — 97166 OT EVAL MOD COMPLEX 45 MIN: CPT

## 2017-10-24 PROCEDURE — G8988 SELF CARE GOAL STATUS: HCPCS | Mod: CI

## 2017-10-24 PROCEDURE — 80061 LIPID PANEL: CPT

## 2017-10-24 PROCEDURE — G8987 SELF CARE CURRENT STATUS: HCPCS | Mod: CJ

## 2017-10-24 PROCEDURE — 97162 PT EVAL MOD COMPLEX 30 MIN: CPT

## 2017-10-24 PROCEDURE — 770020 HCHG ROOM/CARE - TELE (206)

## 2017-10-24 RX ORDER — ATORVASTATIN CALCIUM 40 MG/1
40 TABLET, FILM COATED ORAL EVERY EVENING
Qty: 30 TAB | Refills: 0 | Status: SHIPPED | OUTPATIENT
Start: 2017-10-24

## 2017-10-24 RX ORDER — CLOPIDOGREL BISULFATE 75 MG/1
75 TABLET ORAL DAILY
Qty: 30 TAB | Refills: 0 | Status: ON HOLD | OUTPATIENT
Start: 2017-10-24 | End: 2019-08-17 | Stop reason: SDUPTHER

## 2017-10-24 RX ADMIN — SODIUM CHLORIDE: 9 INJECTION, SOLUTION INTRAVENOUS at 01:38

## 2017-10-24 RX ADMIN — ATORVASTATIN CALCIUM 40 MG: 40 TABLET, FILM COATED ORAL at 20:50

## 2017-10-24 RX ADMIN — ATORVASTATIN CALCIUM 40 MG: 40 TABLET, FILM COATED ORAL at 01:37

## 2017-10-24 RX ADMIN — PAROXETINE HYDROCHLORIDE 20 MG: 20 TABLET, FILM COATED ORAL at 09:45

## 2017-10-24 RX ADMIN — CLOPIDOGREL 75 MG: 75 TABLET, FILM COATED ORAL at 09:45

## 2017-10-24 RX ADMIN — LATANOPROST 1 DROP: 50 SOLUTION OPHTHALMIC at 20:50

## 2017-10-24 ASSESSMENT — COGNITIVE AND FUNCTIONAL STATUS - GENERAL
SUGGESTED CMS G CODE MODIFIER MOBILITY: CK
WALKING IN HOSPITAL ROOM: A LITTLE
DAILY ACTIVITIY SCORE: 20
HELP NEEDED FOR BATHING: A LITTLE
STANDING UP FROM CHAIR USING ARMS: A LITTLE
MOVING FROM LYING ON BACK TO SITTING ON SIDE OF FLAT BED: A LITTLE
TURNING FROM BACK TO SIDE WHILE IN FLAT BAD: A LITTLE
TOILETING: A LITTLE
PERSONAL GROOMING: A LITTLE
CLIMB 3 TO 5 STEPS WITH RAILING: A LITTLE
MOVING TO AND FROM BED TO CHAIR: A LITTLE
MOBILITY SCORE: 18
SUGGESTED CMS G CODE MODIFIER DAILY ACTIVITY: CJ
DRESSING REGULAR LOWER BODY CLOTHING: A LITTLE

## 2017-10-24 ASSESSMENT — ENCOUNTER SYMPTOMS
SHORTNESS OF BREATH: 0
WEAKNESS: 1
DOUBLE VISION: 0
DIZZINESS: 0
RESPIRATORY NEGATIVE: 1
MYALGIAS: 0
CHILLS: 0
CONSTIPATION: 0
PALPITATIONS: 0
PSYCHIATRIC NEGATIVE: 1
DEPRESSION: 0
SPEECH CHANGE: 1
SORE THROAT: 0
FOCAL WEAKNESS: 0
CARDIOVASCULAR NEGATIVE: 1
FEVER: 0
HEADACHES: 0
BLURRED VISION: 0
VOMITING: 0
NAUSEA: 0
TINGLING: 0
GASTROINTESTINAL NEGATIVE: 1
BRUISES/BLEEDS EASILY: 0
EYES NEGATIVE: 1
COUGH: 0
DIARRHEA: 0
MUSCULOSKELETAL NEGATIVE: 1

## 2017-10-24 ASSESSMENT — GAIT ASSESSMENTS
DISTANCE (FEET): 400
ASSISTIVE DEVICE: SINGLE POINT CANE;FRONT WHEEL WALKER
DEVIATION: DECREASED BASE OF SUPPORT
GAIT LEVEL OF ASSIST: CONTACT GUARD ASSIST

## 2017-10-24 ASSESSMENT — PAIN SCALES - GENERAL
PAINLEVEL_OUTOF10: 0
PAINLEVEL_OUTOF10: 0

## 2017-10-24 ASSESSMENT — ACTIVITIES OF DAILY LIVING (ADL): TOILETING: INDEPENDENT

## 2017-10-24 NOTE — PROGRESS NOTES
Pt. Arrived on unit as direct admit- a&ox4, VSS, pt. Denies chest pain/dyspnea/SOB.    On admission it is noted that skin is intact aside from blanching redness/irritation on bridge of nose from pt.'s glasses, ecchymosis on BUE and closed scabs on bilateral shins. Verified with 2nd RN.

## 2017-10-24 NOTE — THERAPY
"Occupational Therapy Evaluation completed.   Functional Status:  SBA supine to sit.  Min A LB dressing.  CGA sit to stand and to walk unit with FWW.  Pt left up in chair at end of session.  Pt presenting with balance deficits that may impact safety during ADL's and functional mobility.  Pt lives alone and may benefit from a short stay at an intense, daily therapy setting prior to DC home.  Plan of Care: Will benefit from Occupational Therapy 5 times per week  Discharge Recommendations:  Equipment: Shower Chair. Post-acute therapy recommended.    See \"Rehab Therapy-Acute\" Patient Summary Report for complete documentation.    "

## 2017-10-24 NOTE — PROGRESS NOTES
Ivone Silva Fall Risk Assessment:     Last Known Fall: Within the last month  Mobility: Use of assistive device/requires assist of two people  Medications: Cardiovascular or central nervous system meds  Mental Status/LOC/Awareness: Awake, alert, and oriented to date, place, and person  Toileting Needs: Use of assistive device (Bedside commode, bedpan, urinal)  Volume/Electrolyte Status: Use of IV fluids/tube feeds  Communication/Sensory: Visual (Glasses)/hearing deficit  Behavior: Appropriate behavior  Ivone Silva Fall Risk Total: 15  Fall Risk Level: HIGH RISK    Universal Fall Precautions:       Fall Risk Level Interventions:     TRIAL (TELE 8, NEURO, MED AMILCAR 5) High Fall Risk Interventions  Place yellow fall risk ID band on patient: completed  Provide patient/family education based on risk assessment: completed  Educate patient/family to call staff for assistance when getting out of bed: completed  Place fall precaution signage outside patient door: completed  Place patient in room close to nursing station: completed  Utilize bed/chair fall alarm: completed  Notify charge of high risk for huddle: completed    Patient Specific Interventions:     Medication: review medications with patient and family  Mental Status/LOC/Awareness: reinforce falls education  Toileting: provide frquent toileting  Volume/Electrolyte Status: ensure patient remains hydrated  Communication/Sensory: update plan of care on whiteboard  Behavioral: encourage patient to voice feelings  Mobility: utilize bed/chair fall alarm, ensure bed is locked and in lowest position, provide appropriate assistive device and instruct patient to exit bed on their strongest side

## 2017-10-24 NOTE — PROGRESS NOTES
Assumed care of pt; pt aox4; pt denies pain, n/v, and n/t; pt continent of bowel and bladder, last bm this am, stool softeners refused due to soft-loose stools; pt ambulatory with SBA; safety precautions in place; updated pt on POC; no needs at this time

## 2017-10-24 NOTE — PROGRESS NOTES
Direct admit from [facility], Dr. Dowling, [phone].  Accepted by Dr. Baker for Stroke.  ADT signed & held @ 1929, needs to be released upon pt arrival.  No written orders received.  Pt coming by ground.

## 2017-10-24 NOTE — PROGRESS NOTES
Ivone Silva Fall Risk Assessment:     Last Known Fall: Within the last month  Mobility: Use of assistive device/requires assist of two people  Medications: Cardiovascular or central nervous system meds  Mental Status/LOC/Awareness: Awake, alert, and oriented to date, place, and person  Toileting Needs: Use of assistive device (Bedside commode, bedpan, urinal)  Volume/Electrolyte Status: Use of IV fluids/tube feeds  Communication/Sensory: Visual (Glasses)/hearing deficit  Behavior: Appropriate behavior  Ivone Silva Fall Risk Total: 15  Fall Risk Level: HIGH RISK    Universal Fall Precautions:       Fall Risk Level Interventions:     TRIAL (TELE 8, NEURO, MED AMILCAR 5) High Fall Risk Interventions  Place yellow fall risk ID band on patient: completed  Provide patient/family education based on risk assessment: completed  Educate patient/family to call staff for assistance when getting out of bed: completed  Place fall precaution signage outside patient door: completed  Place patient in room close to nursing station: completed  Utilize bed/chair fall alarm: completed  Notify charge of high risk for huddle: completed    Patient Specific Interventions:     Medication: review medications with patient and family  Mental Status/LOC/Awareness: reinforce falls education, check on patient hourly, utilize bed/chair fall alarm and reinforce the use of call light  Toileting: instruct patient/family on the need to call for assistance when toileting and do not leave patient unattended in bathroom/refer to toileting scripting  Volume/Electrolyte Status: ensure patient remains hydrated  Communication/Sensory: update plan of care on whiteboard  Behavioral: engage patient in daily activities, administer medication as ordered and instruct/reinforce fall program rationale  Mobility: utilize bed/chair fall alarm and ensure bed is locked and in lowest position

## 2017-10-24 NOTE — CARE PLAN
Problem: Safety  Goal: Will remain free from injury  Outcome: PROGRESSING AS EXPECTED  Fall precautions in place, bed alarm on, treaded socks in place, pt. Calls appropriately for assistance.

## 2017-10-24 NOTE — CARE PLAN
Problem: Communication  Goal: The ability to communicate needs accurately and effectively will improve    Intervention: Educate patient and significant other/support system about the plan of care, procedures, treatments, medications and allow for questions  Pt updated on poc, states understanding      Problem: Safety  Goal: Will remain free from falls    Intervention: Assess risk factors for falls  Safety precautions in place, pt sba

## 2017-10-24 NOTE — PROGRESS NOTES
Medical records received from [facility]:  ER report and Demographics.  Scanned into Media tab.

## 2017-10-24 NOTE — H&P
DATE OF ADMISSION:  12/23/2017    PRIMARY CARE PHYSICIAN:  Angelito Thomas MD    ONCOLOGIST:  María Vee MD    CHIEF COMPLAINT:  Confusion.    HISTORY OF PRESENT ILLNESS:  An 86-year-old female.  Patient has a history of   ovarian cancer 18 years ago, she was recently admitted to the hospital for   severe dehydration.  Patient woke up this morning at approximately 6:00 a.m.   and she felt that something was off and became concerned when she was unable   to operate the  or dial a phone number.  The patient says that she   noted she was having some trouble with her speech.  She had no weakness in   her extremities.  No difficulty ambulating.  She normally ambulates with a   walker.  She denied difficulty with her balance.  Speech was not slurred, but   she did endorse word finding difficulty.  Patient went to the emergency room   in [place].  She was seen and transferred to [facility] as   MRI and neurology consultation unavailable at that institution.    REVIEW OF SYSTEMS:  A comprehensive review of systems was performed.  All   pertinent positives and negatives are described in the HPI, all other systems   reviewed and are negative.    PAST MEDICAL HISTORY:  1.  Recent admission for severe dehydration, was found unresponsive.  2.  Ovarian cancer.  3.  Urinary tract infections.    SOCIAL HISTORY:  She is a lifetime nonsmoker, does not drink alcohol.  She   lives alone, but just down the street from her brother in [place], [place].    ALLERGIES:  No known drug allergies.    MEDICATIONS:  1.  Aspirin 325 mg daily.  2.  Fosamax 70 mg every week.  3.  Lomotil 2.5/0.025 four times a day as needed for diarrhea.  4.  Norco 5/325 every 4 hours as needed for mild-to-severe pain.  5.  Xalatan 0.005% solution 1 drop in the right eye in the evening.  6.  Magnesium oxide 400 mg daily.  7.  Metaxalone 400 mg 3 times a day as needed for muscle spasm.  8.  Oxybutynin 5 mg 4 times a day  as needed for urinary frequency.  9.  Paxil 20 mg daily.  10.  Potassium chloride 20 mEq daily.  11.  Propranolol 10 mg twice daily.  12.  Triamterene hydrochlorothiazide 37.5/25 daily.  13.  Vitamin D supplement.    PHYSICAL EXAMINATION:  VITAL SIGNS:  Temperature 36.9, blood pressure 151/71, pulse 78, respirations   18, and saturating 92% on room air.  GENERAL:  The patient is well-developed, well-nourished, in no apparent   distress.  HEENT:  Pupils are equally round and reactive.  Extraocular movements are   intact.  Anicteric sclerae.  Mucous membranes are moist.  There is some slight   left facial droop.  NECK:  Supple, no lymphadenopathy, no thyromegaly.  CARDIOVASCULAR:  Regular rate and rhythm.  There is a systolic ejection   murmur.  PMI is nondisplaced.  RESPIRATORY:  No respiratory distress, clear to auscultation bilaterally with   no wheezing and no crackles.  ABDOMINAL:  Thin, soft, and nontender.  No rebound or guarding.  EXTREMITIES:  No clubbing, no cyanosis, and no edema.  NEUROLOGIC:  Patient is alert and appropriate.  She is oriented x3.  Speech is   fluent without slurring.  Cranial nerves are intact with the exception of   mild left-sided facial droop, 5/5 strength in upper and lower extremities   bilaterally.  She has chronic right foot drop, which is baseline for her.    Strength in lower extremities are otherwise intact.    LABORATORY DATA:  White blood cell count 7.9, hemoglobin 12.3, platelets 268.    Sodium 137, potassium 4.5, creatinine 0.81.  Urinalysis; negative nitrite,   negative leukocyte esterase.    ASSESSMENT AND PLAN:  An 86-year-old female who presents with expressive   aphasia and confusion concerning for a stroke.  1.  Stroke.  I believe the patient's confusion earlier being unable to use the   phone and her word finding difficulty and facial droop are result of likely a   small stroke.  CT scan of the head done at the outside institution is   negative for intracranial bleed.   MRI of the brain will be performed.    Additional stroke workup will include a carotid ultrasound.  Patient will   receive physical therapy, occupational therapy and speech therapy.  I am going   to defer performing an echocardiogram as she had one done within the last 2   weeks, which was unrevealing.  2.  History of ovarian cancer.  Patient has a PET scan scheduled for this   Wednesday.  I am unsure what the reasoning for performing a PET scan at this   point is except for the patient was recently diagnosed with a lung nodule   during her last admission.  3.  Chronic right foot drop, this is baseline.  4.  Prophylaxis, sequential compression devices and a bowel regimen.  5.  Full code.    I expect the patient to remain in the hospital for greater than 2 midnights.       ____________________________________     MD VAHID FLORES / NTS    DD:  10/23/2017 22:56:17  DT:  10/24/2017 01:51:21    D#:  [id number]  Job#:  [id number]

## 2017-10-24 NOTE — ASSESSMENT & PLAN NOTE
Followed by Dr. Vee  Has outpatient PET scan scheduled outpt, not able to make it with her being admitted  Will need to reschedule the PET scan

## 2017-10-24 NOTE — ADDENDUM NOTE
Encounter addended by: Willian May M.D. on: 10/24/2017  4:13 PM<BR>    Actions taken: Cosign clinical note with attestation

## 2017-10-24 NOTE — THERAPY
Speech Language Therapy Evaluation completed to address speech, communication and cognition  Functional Status:  Aphasia evaluation completed using portions of standardized tests (Western Aphasia Battery, Ross Information Processing Assessment-Geriatric), informal assessment, and observation. Patient is alert and awake in bed. She presents with mild STM deficits, but otherwise normal cognitive-linguistic skills. Specifically, motor speech, verbal expression, word-finding, auditory comprehension, reading comprehension, written expression, attention, immediate memory, problem solving, and safety awareness/insight appear WNL. Patient feels her short term memory is a little worse than normal, but that she isn't too worried about it.  Recommendations:  Patient may benefit from ST services for short term memory, but it is possible her memory deficits are age-appropriate. Patient politely declined ST services for memory. At this time, no further ST services indicated. Patient appears safe to go home from a cognitive standpoint. Also, nursing reports patient passed dysphagia screen and is tolerating a regular/thin liquid diet and whole pills with water without difficulty.  Plan of Care: Patient with no further skilled SLP needs in the acute care setting at this time  Post-Acute Therapy: Currently anticipate no further skilled therapy needs once patient is discharged from the inpatient setting.

## 2017-10-25 ENCOUNTER — HOSPITAL ENCOUNTER (INPATIENT)
Facility: REHABILITATION | Age: 82
End: 2017-10-25
Attending: PHYSICAL MEDICINE & REHABILITATION | Admitting: PHYSICAL MEDICINE & REHABILITATION
Payer: COMMERCIAL

## 2017-10-25 PROCEDURE — 700102 HCHG RX REV CODE 250 W/ 637 OVERRIDE(OP): Performed by: NURSE PRACTITIONER

## 2017-10-25 PROCEDURE — 700102 HCHG RX REV CODE 250 W/ 637 OVERRIDE(OP): Performed by: HOSPITALIST

## 2017-10-25 PROCEDURE — A9270 NON-COVERED ITEM OR SERVICE: HCPCS | Performed by: NURSE PRACTITIONER

## 2017-10-25 PROCEDURE — A9270 NON-COVERED ITEM OR SERVICE: HCPCS | Performed by: HOSPITALIST

## 2017-10-25 PROCEDURE — 770020 HCHG ROOM/CARE - TELE (206)

## 2017-10-25 PROCEDURE — 99232 SBSQ HOSP IP/OBS MODERATE 35: CPT | Performed by: INTERNAL MEDICINE

## 2017-10-25 RX ORDER — DIPHENOXYLATE HYDROCHLORIDE AND ATROPINE SULFATE 2.5; .025 MG/1; MG/1
1 TABLET ORAL 4 TIMES DAILY PRN
Status: DISCONTINUED | OUTPATIENT
Start: 2017-10-25 | End: 2017-10-30 | Stop reason: HOSPADM

## 2017-10-25 RX ORDER — PROPRANOLOL HYDROCHLORIDE 10 MG/1
10 TABLET ORAL TWICE DAILY
Status: DISCONTINUED | OUTPATIENT
Start: 2017-10-25 | End: 2017-10-30 | Stop reason: HOSPADM

## 2017-10-25 RX ADMIN — ATORVASTATIN CALCIUM 40 MG: 40 TABLET, FILM COATED ORAL at 20:32

## 2017-10-25 RX ADMIN — CLOPIDOGREL 75 MG: 75 TABLET, FILM COATED ORAL at 08:37

## 2017-10-25 RX ADMIN — PROPRANOLOL HYDROCHLORIDE 10 MG: 10 TABLET ORAL at 20:32

## 2017-10-25 RX ADMIN — PAROXETINE HYDROCHLORIDE 20 MG: 20 TABLET, FILM COATED ORAL at 08:37

## 2017-10-25 RX ADMIN — LATANOPROST 1 DROP: 50 SOLUTION OPHTHALMIC at 20:33

## 2017-10-25 ASSESSMENT — ENCOUNTER SYMPTOMS
NAUSEA: 0
FOCAL WEAKNESS: 0
DOUBLE VISION: 0
BLURRED VISION: 0
MYALGIAS: 0
MUSCULOSKELETAL NEGATIVE: 1
GASTROINTESTINAL NEGATIVE: 1
BRUISES/BLEEDS EASILY: 0
CONSTIPATION: 0
TINGLING: 0
FEVER: 0
SHORTNESS OF BREATH: 0
DEPRESSION: 0
WEAKNESS: 1
CARDIOVASCULAR NEGATIVE: 1
RESPIRATORY NEGATIVE: 1
DIARRHEA: 0
HEADACHES: 0
COUGH: 0
PALPITATIONS: 0
EYES NEGATIVE: 1
CHILLS: 0
PSYCHIATRIC NEGATIVE: 1
DIZZINESS: 0
SORE THROAT: 0
VOMITING: 0

## 2017-10-25 ASSESSMENT — PAIN SCALES - GENERAL
PAINLEVEL_OUTOF10: 0

## 2017-10-25 NOTE — THERAPY
"Physical Therapy Evaluation completed.   Bed Mobility:  Supine to Sit: Supervised  Transfers: Sit to Stand: Contact Guard Assist  Gait: Level Of Assist: Contact Guard Assist with Front-Wheel Walker     And single point cane  Plan of Care: Will benefit from Physical Therapy 5 times per week  Discharge Recommendations: Equipment: No Equipment Needed.    Pt is a 86 y.o. female with hx of falls, admitted for severe dehydration, and difficulty with word finding. 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 Pt demos SPV with most bed mobility, transfers, and CGA with walking with some slight LOB laterally to the right side. Pt optimally would benefit from a short rehabilitation stay to increase balance and reduce reoccurance of falls. However, if pt has guaranteed help with family reporting 24/7 supervision and home-health pt may also be acceptable to d/c home. will follow.   See \"Rehab Therapy-Acute\" Patient Summary Report for complete documentation.     "

## 2017-10-25 NOTE — CARE PLAN
Problem: Safety  Goal: Will remain free from falls  Outcome: PROGRESSING AS EXPECTED  Fall precautions in place - bed in lowest position, bed alarm is on, call light and personal belongings within reach.     Problem: Bowel/Gastric:  Goal: Normal bowel function is maintained or improved  Outcome: PROGRESSING AS EXPECTED  Holding stool softeners as pt is having loose stools

## 2017-10-25 NOTE — CONSULTS
DATE OF SERVICE:  10/24/2017    REQUESTING PHYSICIAN:  Michelle Mcnally MD.    REASON FOR CONSULTATION:  Stroke.    HISTORY OF PRESENT ILLNESS:  The patient is an 86-year-old right-handed female   with past medical history significant for ovarian cancer and recent admission   for dehydration who presented to hospital for evaluation of confusion and   inability to use  and her phone.  She says she also noted some   trouble with her speech.  However, there was no weakness or numbness of   extremity.  She presented to emergency room and underwent brain MRI, which   revealed multiple very small punctate areas of acute infarct scattered in the   right hemisphere.  Her symptoms had completely resolved at this point.    PAST MEDICAL HISTORY:  Significant for ovarian cancer, and history of recent   admission for severe dehydration.  Apparently, patient was found unresponsive.    History of urinary tract infection.    MEDICATIONS:  Reviewed as per MAR.    ALLERGIES:  No known drug allergy.    SOCIAL HISTORY:  She has no history of smoking, drinking, or drug abuse.    FAMILY HISTORY:  Reviewed and noncontributory.  There is no history of   premature heart attack or stroke.    REVIEW OF SYSTEMS:  As above.  All other systems are normal.  Please also see    ____ review of system and H and P dated 10/23/2017.    PHYSICAL EXAMINATION:  VITAL SIGNS:  On examination today, heart rate 91, blood pressure 155/74,   respiration 18, O2 saturation 90% on room air, and temperature 37.3.  NECK:  Supple, no carotid bruit.  CARDIOVASCULAR:  Regular rate and rhythm.  LUNGS:  Clear.  ABDOMEN:  Soft.  EXTREMITIES:  No cyanosis or clubbing.  NEUROLOGICAL EXAMINATION:  She is awake, alert, fully oriented.  Speech and   memory within normal limits.  Facial motor and sensation intact.  Extraocular   movements are full.  Visual fields are full to confrontation.  Hearing is   intact to finger rub bilaterally.  Tongue midline and  protrudes symmetrically.    Palate elevates symmetrically.  Shoulder shrugs are normal.  Motor   examination revealed normal strength to direct testing of both upper and lower   extremity, proximal and distal.  Sensation intact to light touch,   temperature, and pinprick.  Coordination intact finger-to-nose and   heel-to-shin testing intact.  Reflexes are 1+ and equal.  Plantar reflexes are   downgoing.    ASSESSMENT AND PLAN:  An 86-year-old female with multiple punctate areas of   acute infarct in right basal ganglia and right cerebellum.  Patient's symptom   had resolved completely.  She underwent echocardiogram and carotid ultrasound.    Her carotid ultrasound revealed no hemodynamically significant stenosis.    Her echocardiogram revealed ejection fraction of 75% with no evidence of   thrombus.  Her LDL is 78.  She was started on Lipitor 40 mg daily.  She was on   aspirin prior to this event, which was changed to Plavix 75 mg daily.  She   will be evaluated by physical therapy, occupational therapy, speech therapy,   and if cleared and otherwise stable, she can be discharged on the Plavix and   Lipitor.       ____________________________________     MD ANGELA Gan / JEAN PAUL    DD:  10/24/2017 18:33:32  DT:  10/24/2017 18:51:50    D#:  [id number]  Job#:  [id number]

## 2017-10-25 NOTE — ASSESSMENT & PLAN NOTE
Symptoms resolved, suspect previous syncopal episode earlier this month may have been due to this as well  Time of onset unknown, not TPA candidate  Was on , change anti-platelet to plavix  Atorvastatin  PT/OT/Speech- rec rehab  Refused rehab, awaiting [facility] transportation and bed arrangements- appreciate SW assistance

## 2017-10-25 NOTE — PREADMISSION SCREENING NOTE
Pre-Admission Screening Form    Patient Information:   Name: Marifer Eldridge     MRN: [id number]       : 1931      Age: 86 y.o.   Gender: female      Race: White [7]       Marital Status:  [5]  Family Contact: Sea Melo        Relationship: Sibling [19]  Home Phone: [phone]           Cell Phone:   Advanced Directives: Copy in Chart  Code Status:  DNR  Current Attending Provider: Michelle Mcnally M.D.  Referring Physician: Bouchra ROBISON       Physiatrist Consult: Dr. Fairchild        Referral Date: 10/25/2017  Primary Payor Source:  [facility]  Secondary Payor Source:      Medical Information:   Date of Admission to Acute Care Setting:10/23/2017  Room Number: S199/02  Rehabilitation Diagnosis: 01.9 Other Stroke  right basal ganglia and right cerebellar likely representing acute punctate lacunar infarcts    Allergies   Allergen Reactions   • Metrogel      Headaches   • Tessalon [Benzonatate]      Headache     • Zoloft      headache     Past Medical History:   Diagnosis Date   • Arthritis     back   • Cancer (CMS-HCC)     ovarian   • Dental disorder     upper partial   • Essential hypertension, benign    • Generalized anxiety disorder    • Heart murmur     systolic   • Other specified disorder of intestines     diarrhea from some foods   • Overactive bladder    • Pain     low back pain   • Panic attacks    • Reactive airway disease     uses no inhalers   • Symptomatic menopausal or female climacteric states    • Unspecified cataract     bilateral IOL   • Unspecified pleural effusion    • Unspecified urinary incontinence      Past Surgical History:   Procedure Laterality Date   • LUMBAR LAMINECTOMY DISKECTOMY  2015    Performed by Maxwell Lei M.D. at [facility]   • COLONOSCOPY  3/2010    Normal    • OOPHORECTOMY  2008       Dr Dee   • THORACOTOMY  2008    pleurodesis   • ANGIOGRAM  2007    cardiac  wnl   • ABDOMINAL EXPLORATION     • ABDOMINAL HYSTERECTOMY  TOTAL      still has 1 ovary   • COLONOSCOPY  1999, 2005    both ok        History Leading to Admission, Conditions that Caused the Need for Rehab (CMS):     Brent Gong M.D. Physician Signed [facility]  H&P Date of Service: 10/23/2017 10:56 PM             DATE OF ADMISSION:  12/23/2017     PRIMARY CARE PHYSICIAN:  Angelito Thomas MD     ONCOLOGIST:  María Vee MD     CHIEF COMPLAINT:  Confusion.     HISTORY OF PRESENT ILLNESS:  An 86-year-old female.  Patient has a history of   ovarian cancer 18 years ago, she was recently admitted to the hospital for   severe dehydration.  Patient woke up this morning at approximately 6:00 a.m.   and she felt that something was off and became concerned when she was unable   to operate the  or dial a phone number.  The patient says that she   noted she was having some trouble with her speech.  She had no weakness in   her extremities.  No difficulty ambulating.  She normally ambulates with a   walker.  She denied difficulty with her balance.  Speech was not slurred, but   she did endorse word finding difficulty.  Patient went to the emergency room   in [place].  She was seen and transferred to [facility] as   MRI and neurology consultation unavailable at that institution.        ASSESSMENT AND PLAN:  An 86-year-old female who presents with expressive   aphasia and confusion concerning for a stroke.  1.  Stroke.  I believe the patient's confusion earlier being unable to use the   phone and her word finding difficulty and facial droop are result of likely a   small stroke.  CT scan of the head done at the outside institution is   negative for intracranial bleed.  MRI of the brain will be performed.    Additional stroke workup will include a carotid ultrasound.  Patient will   receive physical therapy, occupational therapy and speech therapy.  I am going   to defer performing an echocardiogram as she had one done within the last 2    weeks, which was unrevealing.  2.  History of ovarian cancer.  Patient has a PET scan scheduled for this   Wednesday.  I am unsure what the reasoning for performing a PET scan at this   point is except for the patient was recently diagnosed with a lung nodule   during her last admission.  3.  Chronic right foot drop, this is baseline.  4.  Prophylaxis, sequential compression devices and a bowel regimen.  5.  Full code.     I expect the patient to remain in the hospital for greater than 2 midnights.        ____________________________________     BETZAIDA FLOREZ MD     AEF / NTS     DD:  10/23/2017 22:56:17        Mildred Avila M.D. Physician Unsigned Transcription Neurology  Consults Date of Service: 10/24/2017  6:33 PM           DATE OF SERVICE:  10/24/2017     REQUESTING PHYSICIAN:  Michelle Mcnally MD.     REASON FOR CONSULTATION:  Stroke.     HISTORY OF PRESENT ILLNESS:  The patient is an 86-year-old right-handed female   with past medical history significant for ovarian cancer and recent admission   for dehydration who presented to hospital for evaluation of confusion and   inability to use  and her phone.  She says she also noted some   trouble with her speech.  However, there was no weakness or numbness of   extremity.  She presented to emergency room and underwent brain MRI, which   revealed multiple very small punctate areas of acute infarct scattered in the   right hemisphere.  Her symptoms had completely resolved at this point.           ASSESSMENT AND PLAN:  An 86-year-old female with multiple punctate areas of   acute infarct in right basal ganglia and right cerebellum.  Patient's symptom   had resolved completely.  She underwent echocardiogram and carotid ultrasound.    Her carotid ultrasound revealed no hemodynamically significant stenosis.    Her echocardiogram revealed ejection fraction of 75% with no evidence of   thrombus.  Her LDL is 78.  She was started on Lipitor 40 mg daily.   She was on   aspirin prior to this event, which was changed to Plavix 75 mg daily.  She   will be evaluated by physical therapy, occupational therapy, speech therapy,   and if cleared and otherwise stable, she can be discharged on the Plavix and   Lipitor.        ____________________________________     MD ANGELA Gan / JEAN PAUL     DD:  10/24/2017 18:33:32      Wilian Fairchild M.D. Physician Signed Physical Medicine & Rehab  Consults Date of Service: 10/25/2017 10:35 AM   Consult Orders:   IP CONSULT FOR PHYSIATRY [[id number]] ordered by NICOLASA Pineda at 10/25/17 1013      Expand All Collapse All      Medical chart review completed.      Patient is a 86 y.o. year-old female with a past medical history significant for ovarian cancer, overactive bladder, HTN admitted to [facility] on 10/23/2017  7:40 PM with confusion and inability to use phone.  Patient presented to ED and underwent MRI which showed a few punctate areas of diffusion in the right basal ganglia and right cerebellar likely representing acute punctate lacunar infarcts. Per report has generalized weakness without specific hemiplegia.  Patient with echo which showed EF of 75% with no evidence of thrombus.  Carotid ultrasound without significant stenosis. Neurology was consulted and they recommended Plavix and Lipitor.       Patient was evaluated by SLP on 10/24/17 and reportedly close to baseline.  Patient was evaluated on 10/24/17 by PT and found to be CGA with transfers and Gait.  Patient evaluated by OT on 10/24/17 and found to be Kavitha for LB dressing and with balance issues.            Living Site:  Home  Living With:  self  Caregiver's availability: Brother can help occassionally   Number of stairs:  4  Substance use history:  None        The patient presents  with cognitive deficits and functional deficits in mobility/self-cares/swallowing/speech, and Minimal  de-conditioning. Pre-morbidly, this patient lived in a  single level home with Four steps to enter,alone and able to care for self  The patient was evaluated by acute care Physical Therapy, Occupational Therapy and Speech Language Pathology; currently requiring contact-guard assistance for mobility and minimal assistance for ADLs. The patient's current diet is Regular with Thin liquids. The patient is   a Good candidate for an acute inpatient rehabilitation program with a coordinated program of care at an intensity and frequency not available at a lower level of care. This recommendation is based on patient's current need of [place]/CGA for mobility and ADLs.  If patient progresses such that she no longer requires [place]/CG assistance in at least two therapy specialties then she may more appropriate for home with home health services.     This recommendation is substantiated by the patient's current medical condition with intervention and assessment of medical issues requiring an acute level of care for patient's safety and maximum outcome. A coordinated program of care will be provided by an interdisciplinary team including physical therapy, occupational therapy, physiatry, rehab nursing and rehab psychology. Rehab goals include improved mobility, self-care management, strength and conditioning/endurance, pain management, bowel and bladder management, mood and affect, and safety with independent home management including caregiver training. Estimated length of stay is approximately 10-14 days. Rehab potential: Good. Disposition: to pre-morbid independent living setting with supportive care of patient's brotehr and community resources. We will continue to follow with you in anticipation of discharge to acute inpatient rehabilitation when medically stable to do so at the discretion of her attending physician. Thank you for allowing us to participate in her care. Please call with any questions regarding this recommendation.     Wilian Fairchild,  "M.D.        Co-morbidities: as listed above   Potential Risk - Complications: Cognitive Impairment, Contractures, Deep Vein Thrombosis, Dysphagia, Incontinence, Malnutrition, Paralysis and Perceptual Impairment  Level of Risk: High    Ongoing Medical Management Needed (Medical/Nursing Needs):   Patient Active Problem List    Diagnosis Date Noted   • Expressive aphasia 10/23/2017     Priority: High   • Personal history of ovarian cancer 06/14/2012     Priority: Medium   • Lung nodule 10/11/2017     Priority: Low   • HTN (hypertension) 10/11/2017     Priority: Low   • Systolic murmur 08/25/2009     Priority: Low   • CVA (cerebral vascular accident) (CMS-HCC) 10/24/2017   • DNR (do not resuscitate) 10/24/2017   • Muscle spasm of back 10/25/2016   • Meningioma ([facility]) 04/29/2016   • Overactive bladder 04/07/2016   • Neural foraminal stenosis of cervical spine 04/10/2014   • Peripheral neuropathy (CMS-HCC) 03/12/2013   • Acne rosacea 06/14/2012   • Osteopenia 12/13/2011   • Vitamin D deficiency disease 11/04/2010   • Reactive airway disease 08/25/2009   • Panic attacks 08/25/2009   • Hemorrhoid 08/25/2009   • Anxiety 05/08/2009   • Essential hypertension, benign 05/08/2009   • Diarrhea following gastrointestinal surgery 05/08/2009     Merna Gann R.N. Registered Nurse Signed   Progress Notes Date of Service: 10/24/2017 11:33 PM           Pt A&Ox4, pt declines pain, N/V, and N/T. Pt experiencing loose stools, holding stool softeners. Bed alarm is on, call light and personal belongings within reach.           Current Vital Signs:   Temperature: 37.2 °C (98.9 °F) Pulse: 74 Respiration: 15 Blood Pressure : 148/67  Weight: 48.8 kg (107 lb 9.4 oz) Height: 160 cm (5' 2.99\")  Pulse Oximetry: 93 % O2 (LPM): 0      Completed Laboratory Reports:  Recent Labs      10/24/17   0430  10/24/17   0431   WBC  5.4   --    HEMOGLOBIN  11.6*   --    HEMATOCRIT  34.7*   --    PLATELETCT  245   --    SODIUM   --   137   POTASSIUM   " --   3.9   BUN   --   15   CREATININE   --   0.55   GLUCOSE   --   97     10/24/2017 12:57 AM    HISTORY/REASON FOR EXAM:  Stroke and aphasia.      TECHNIQUE/EXAM DESCRIPTION:  MRI of the brain without contrast.    T1 sagittal, T2 fast spin-echo axial, T1 coronal, FLAIR coronal, diffusion-weighted and apparent diffusion coefficient (ADC map) axial images were obtained of the whole brain.    The study was performed on a [employer] Signa 1.5 Shannon MRI scanner.    COMPARISON:  None.    FINDINGS: The axial diffusion weighted sequences demonstrates tiny punctate areas of restricted diffusion in the right basal ganglia and right cerebellum. A few nonspecific T2 hyperintensities are noted in the subcortical and periventricular white matter   and thalamus. Punctate gliosis is seen. There is no intra-axial space-occupying lesion. Moderate cerebral volume loss is seen. There are dilated perivascular spaces in the brainstem.    The ventricles, cortical sulci and the basal cisterns are moderate cerebral loss. There is an approximately 11/13 mm sized T2 hyperintense extra-axial lesion along the right posterior frontal lobe. The visualized flow voids of the cerebral vasculature   are unremarkable.  There is no large lesion identified in the expected course of the intracranial portions of the cranial nerves.    The skull bones. The paranasal sinuses are clear. The extracranial soft tissue including orbits appear grossly normal.     Impression       1.  A few punctate areas of diffusion in the right basal ganglia and right cerebellar likely representing acute punctate lacunar infarcts.  2.  An approximately 11 x 13 mm sized T2 hyperintense extra-axial lesion along the right posterior temporal lobe likely representing a calcified meningioma.  3.  Mild chronic microvascular ischemic disease.  4.  Moderate cerebral atrophy.   Reading Provider Reading Date   Gilbert Ibrahim M.D. Oct 24, 2017       Additional Labs: Not Applicable    Prior  Living Situation:   Housing / Facility: [address] [facility]  Steps Into Home: 4  Lives with - Patient's Self Care Capacity: Alone and Able to Care For Self  Equipment Owned: Front-Wheel Walker    Prior Level of Function / Living Situation:   Physical Therapy: Prior Services: None, Home-Independent  Housing / Facility: [address] [facility]  Steps Into Home: 4  Rail: Both Rail (Steps in Home)  Bathroom Set up: Walk In Shower  Equipment Owned: Front-Wheel Walker  Lives with - Patient's Self Care Capacity: Alone and Able to Care For Self  Bed Mobility: Independent  Transfer Status: Independent  Ambulation: Independent  Distance Ambulation (Feet): 150  Assistive Devices Used: Single Point Cane  Stairs: Independent  Current Level of Function:   Level Of Assist: Contact Guard Assist  Assistive Device: Single Point Cane, Front Wheel Walker  Distance (Feet): 400  Deviation: Decreased Base Of Support  # of Stairs Climbed: 10  Level of Assist with Stairs: Contact Guard Assist  Weight Bearing Status: full  Supine to Sit: Supervised  Sit to Supine: Supervised  Scooting: Supervised  Rolling: Supervised  Sit to Stand: Contact Guard Assist  Bed, Chair, Wheelchair Transfer: Supervised  Transfer Method: Stand Pivot  Sitting in Chair: >5 mins (pt up in chair, nursing notified.)  Sitting Edge of Bed: 3 mins  Standing: >8 mins  Occupational Therapy:   Self Feeding: Independent  Grooming / Hygiene: Independent  Bathing: Independent  Dressing: Independent  Toileting: Independent  Medication Management: Independent  Laundry: Independent  Kitchen Mobility: Independent  Finances: Independent  Home Management: Independent  Shopping: Independent  Prior Level Of Mobility: Independent With Device in Community  Driving / Transportation: Driving Independent  Prior Services: None, Home-Independent  Housing / Facility: [address] [facility]  Occupation (Pre-Hospital Vocational): Retired Due To Age  Current Level of Function:   Upper Body Dressing: Supervision  Lower  Body Dressing: Minimal Assist  Speech Language Pathology:   Assessment : Aphasia evaluation completed using portions of standardized tests (Western Aphasia Battery, Ross Information Processing Assessment-Geriatric), informal assessment, and observation. Patient is alert and awake in bed. She presents with mild STM deficits, but otherwise normal cognitive-linguistic skills. Specifically, motor speech, verbal expression, word-finding, auditory comprehension, reading comprehension, written expression, attention, immediate memory, problem solving, and safety awareness/insight appear WNL. Patient feels her short term memory is a little worse than normal, but that she isn't too worried about it. Patient may benefit from ST services for short term memory, but it is possible her memory deficits are age-appropriate. Patient politely declined ST services for memory. At this time, no further ST services indicated. Patient appears safe to go home from a cognitive standpoint. Also, nursing reports patient passed dysphagia screen and is tolerating a regular/thin liquid diet and whole pills with water without difficulty.  Rehabilitation Prognosis/Potential: Good  Estimated Length of Stay: 7-14 days    Nursing:   Orientation : Oriented x 4  Continent    Scope/Intensity of Services Recommended:  Physical Therapy: 1.5 hr / day  5 days / week. Therapeutic Interventions Required: Maximize Endurance, Mobility, Strength and Safety  Occupational Therapy: 1.5 hr / day 5 days / week. Therapeutic Interventions Required: Maximize Self Care, ADLs, IADLs and Energy Conservation  Speech & Language Pathology: evaluate  5 days / week. Therapeutic Interventions Required: Maximize as needed.  Rehabilitation Nursin/7. Therapeutic Interventions Required: Monitor Pain, Vital Signs, Intake and Output, Labs, Safety and Family Training  Rehabilitation Physician: 3 - 5 days / week. Therapeutic Interventions Required: Medical  Management    Rehabilitation Goals and Plan (Expected frequency & duration of treatment in the IRF):   Return to the Community and Modified Independent Level of Care  Anticipated Date of Rehabilitation Admission: 10/26/2017  Patient/Family oriented IRF level of care/facility/plan: Yes  Patient/Family willing to participate in IRF care/facility/plan: Yes  Patient able to tolerate IRF level of care proposed: Yes  Patient has potential to benefit IRF level of care proposed: Yes  Comments: Not Applicable    Special Needs or Precautions - Medical Necessity:  Safety Concerns/Precautions:  Fall Risk / High Risk for Falls, Balance and Visually Impaired  Cardiac Precautions  Current Medications:    Current Facility-Administered Medications Ordered in Epic   Medication Dose Route Frequency Provider Last Rate Last Dose   • senna-docusate (PERICOLACE or SENOKOT S) 8.6-50 MG per tablet 2 Tab  2 Tab Oral BID Brent Gong M.D.   Stopped at 10/24/17 2044    And   • polyethylene glycol/lytes (MIRALAX) PACKET 1 Packet  1 Packet Oral QDAY PRN Brent Gong M.D.        And   • magnesium hydroxide (MILK OF MAGNESIA) suspension 30 mL  30 mL Oral QDAY PRN Brent Gong M.D.        And   • bisacodyl (DULCOLAX) suppository 10 mg  10 mg Rectal QDAY PRN Brent Gong M.D.       • atorvastatin (LIPITOR) tablet 40 mg  40 mg Oral Q EVENING Brent Gong M.D.   40 mg at 10/24/17 2050   • latanoprost (XALATAN) 0.005 % ophthalmic solution 1 Drop  1 Drop Right Eye Nightly Brent Gong M.D.   1 Drop at 10/24/17 2050   • metaxalone (SKELAXIN) tablet 400-800 mg  400-800 mg Oral TID PRN Brent Gong M.D.       • paroxetine (PAXIL) tablet 20 mg  20 mg Oral DAILY Brent Gong M.D.   20 mg at 10/25/17 0837   • oxybutynin (DITROPAN) tablet 5 mg  5 mg Oral 4X/DAY PRN Brent Gong M.D.       • clopidogrel (PLAVIX) tablet 75 mg  75 mg Oral DAILY Brent Gong M.D.   75 mg at 10/25/17 0837     Current Outpatient Prescriptions Ordered in Epic   Medication  Sig Dispense Refill   • atorvastatin (LIPITOR) 40 MG Tab Take 1 Tab by mouth every evening. 30 Tab 0   • clopidogrel (PLAVIX) 75 MG Tab Take 1 Tab by mouth every day. 30 Tab 0     Diet:   DIET ORDERS (Through next 24h)    Start     Ordered    10/23/17 2258  DIET ORDER  ALL MEALS     Question:  Diet:  Answer:  Regular    10/23/17 2258          Anticipated Discharge Destination / Patient/Family Goal:  Destination: Home Alone Support System: Family  and Friends  Anticipated home health services: OT, PT, Nursing, Social Work and Aide  Previously used HH service/ provider: Not Applicable  Anticipated DME Needs: Walker  Outpatient Services: OT and PT  Alternative resources to address additional identified needs:       Pre-Screen Completed: 10/25/2017 1:56 PM Tremaine Shoemaker

## 2017-10-25 NOTE — PROGRESS NOTES
Hospital Medicine Interval Note  Date of Service:  10/25/2017    Chief Complaint  86 y.o.-year-old female admitted 10/23/2017 with history of ovarian cancer 18 years ago, she was recently admitted to the hospital for severe dehydration with a syncopal episode/confusion.  Patient woke up and she felt that something was off and became concerned when she was unable to operate the  or dial a phone number.  She was having some trouble with her speech.  She had no weakness in her extremities.  No difficulty ambulating.  She normally ambulates with a walker.  She denied difficulty with her balance.  Speech was not slurred, but she did endorse word finding difficulty.    She was transferred to [facility] as MRI and neurology consultation unavailable at her local facility. MRI was performed and she is found to have tiny acute lacunar right infarcts. She did have a very mild left facial droop at admission. Dr Avila with neurology consulted and cleared her for discharge to home with Stroke bridge clinic follow up and escalate her asa to plavix and add on a statin. Her recent echocardiogram was reviewed and is overall not significant and carotids are also unremarkable.   PT and OT evaluated and determined that she does have some weakness and balance concerns and would benefit from a short rehab stay.    10/25- doing well without complaints. Agrees to rehab today. Rehab physician has evaluated and feels she is a good candidate. Will have to reschedule her PET scan.    Consultants/Specialty  Dr Avila- neuro    Disposition  Rehab acceptance pending. Attempt to get her to PET scan rescheduled, cont pt/ot     Review of Systems   Constitutional: Negative for chills, fever and malaise/fatigue.   HENT: Negative.  Negative for congestion and sore throat.    Eyes: Negative.  Negative for blurred vision and double vision.   Respiratory: Negative.  Negative for cough and shortness of breath.     Cardiovascular: Negative.  Negative for chest pain, palpitations and leg swelling.   Gastrointestinal: Negative.  Negative for constipation, diarrhea, nausea and vomiting.   Genitourinary: Negative.  Negative for dysuria.   Musculoskeletal: Negative.  Negative for joint pain and myalgias.   Skin: Negative.  Negative for itching and rash.   Neurological: Positive for weakness (gen , off balance occasionally). Negative for dizziness, tingling, focal weakness and headaches.   Endo/Heme/Allergies: Negative.  Does not bruise/bleed easily.   Psychiatric/Behavioral: Negative.  Negative for depression.      Physical Exam Laboratory/Imaging   Vitals:    10/24/17 1200 10/24/17 2000 10/25/17 0400 10/25/17 0800   BP: 155/74 121/48 141/61 148/67   Pulse: 91 77 74 74   Resp: 18 16 16 15   Temp: 37.3 °C (99.2 °F) 37.3 °C (99.2 °F) 37.1 °C (98.8 °F) 37.2 °C (98.9 °F)   SpO2: 90% 90% 91% 93%   Weight:       Height:         Physical Exam   Constitutional: She is oriented to person, place, and time. She appears well-developed and well-nourished. No distress.   HENT:   Head: Normocephalic and atraumatic.   Nose: Nose normal.   Mouth/Throat: Oropharynx is clear and moist. No oropharyngeal exudate.   Eyes: Conjunctivae and EOM are normal. Pupils are equal, round, and reactive to light. Right eye exhibits no discharge. Left eye exhibits no discharge. No scleral icterus.   Neck: Normal range of motion. Neck supple. No JVD present.   Cardiovascular: Normal rate, regular rhythm and intact distal pulses.    Murmur (systolic) heard.  Pulmonary/Chest: Effort normal and breath sounds normal. No respiratory distress.   Abdominal: Soft. Bowel sounds are normal. She exhibits no distension. There is no tenderness.   Musculoskeletal: Normal range of motion. She exhibits no edema or tenderness.   Neurological: She is alert and oriented to person, place, and time. She has normal reflexes. No cranial nerve deficit. She exhibits normal muscle tone.  Coordination normal.   Skin: Skin is warm and dry. She is not diaphoretic.   Psychiatric: She has a normal mood and affect. Her behavior is normal. Judgment and thought content normal.   Nursing note and vitals reviewed.   Lab Results   Component Value Date/Time    WBC 5.4 10/24/2017 04:30 AM    HEMOGLOBIN 11.6 (L) 10/24/2017 04:30 AM    HEMATOCRIT 34.7 (L) 10/24/2017 04:30 AM    PLATELETCT 245 10/24/2017 04:30 AM     Lab Results   Component Value Date/Time    SODIUM 137 10/24/2017 04:31 AM    POTASSIUM 3.9 10/24/2017 04:31 AM    CHLORIDE 102 10/24/2017 04:31 AM    CO2 29 10/24/2017 04:31 AM    GLUCOSE 97 10/24/2017 04:31 AM    BUN 15 10/24/2017 04:31 AM    CREATININE 0.55 10/24/2017 04:31 AM    CREATININE 1.0 04/30/2008 12:30 PM      Assessment/Plan    * CVA (cerebral vascular accident) (CMS-[facility])- (present on admission)   Assessment & Plan    Symptoms resolved, suspect previous syncopal episode earlier this month may have been due to this as well  Time of onset unknown, not TPA candidate  Was on , change anti-platelet to plavix  Atorvastatin  PT/OT/Speech- rec rehab  Rehab eval done and approved- awaiting ins acceptance        Expressive aphasia- (present on admission)   Assessment & Plan    Resolved see above        Personal history of ovarian cancer- (present on admission)   Assessment & Plan    Followed by Dr. Vee  Has outpatient PET scan scheduled outpt, not able to make it with her being admitted  Will need to reschedule the PET scan        Systolic murmur- (present on admission)   Assessment & Plan    Recent echo shows only aortic sclerosis  Ok to cont propanolol             Reviewed items::  Labs reviewed, Radiology images reviewed and Medications reviewed  Michael catheter::  No Michael  DVT prophylaxis - mechanical:  SCDs  Ulcer Prophylaxis::  Not indicated

## 2017-10-25 NOTE — PROGRESS NOTES
Monitor Summary: SR 74-98, CT .20, QRS .08, QT .33 with R. PVC, R. PAC and tachycardia to 130s per strip from monitor room

## 2017-10-25 NOTE — PROGRESS NOTES
Hospital Medicine Interval Note  Date of Service:  10/24/2017    Chief Complaint  86 y.o.-year-old female admitted 10/23/2017 with history of ovarian cancer 18 years ago, she was recently admitted to the hospital for severe dehydration with a syncopal episode/confusion.  Patient woke up and she felt that something was off and became concerned when she was unable to operate the  or dial a phone number.  She was having some trouble with her speech.  She had no weakness in her extremities.  No difficulty ambulating.  She normally ambulates with a walker.  She denied difficulty with her balance.  Speech was not slurred, but she did endorse word finding difficulty.    She was transferred to [facility] as MRI and neurology consultation unavailable at her local facility. MRI was performed and she is found to have tiny acute lacunar right infarcts. She did have a very mild left facial droop at admission. Dr Avila with neurology consulted and cleared her for discharge to home with Stroke bridge clinic follow up and escalate her asa to plavix and add on a statin. Her recent echocardiogram was reviewed and is overall not significant and carotids are also unremarkable.   PT and OT evaluated and determined that she does have some weakness and balance concerns and would benefit from a short rehab stay. She is due to get a PET scan tomorrow across the street, we have asked nursing and SW to assist her to still be able to get this.    Consultants/Specialty  Dr Avila- neuro    Disposition  Rehab consult, vs SNF, vs . Attempt to get her to PET scan still tomorrow     Review of Systems   Constitutional: Negative for chills, fever and malaise/fatigue.   HENT: Negative.  Negative for congestion and sore throat.    Eyes: Negative.  Negative for blurred vision and double vision.   Respiratory: Negative.  Negative for cough and shortness of breath.    Cardiovascular: Negative.  Negative for chest pain,  palpitations and leg swelling.   Gastrointestinal: Negative.  Negative for constipation, diarrhea, nausea and vomiting.   Genitourinary: Negative.  Negative for dysuria.   Musculoskeletal: Negative.  Negative for joint pain and myalgias.   Skin: Negative.  Negative for itching and rash.   Neurological: Positive for speech change (had at home yesterday, quickly resolved) and weakness (gen). Negative for dizziness, tingling, focal weakness and headaches.   Endo/Heme/Allergies: Negative.  Does not bruise/bleed easily.   Psychiatric/Behavioral: Negative.  Negative for depression.      Physical Exam Laboratory/Imaging   Vitals:    10/24/17 0000 10/24/17 0400 10/24/17 0800 10/24/17 1200   BP: 151/65 145/61 158/72 155/74   Pulse: 80 80 81 91   Resp: 20 16 17 18   Temp: 37 °C (98.6 °F) 36.8 °C (98.3 °F) 36.8 °C (98.2 °F) 37.3 °C (99.2 °F)   SpO2: 90% 94% 91% 90%   Weight:       Height:         Physical Exam   Constitutional: She is oriented to person, place, and time. She appears well-developed and well-nourished. No distress.   HENT:   Head: Normocephalic and atraumatic.   Nose: Nose normal.   Mouth/Throat: Oropharynx is clear and moist. No oropharyngeal exudate.   Eyes: Conjunctivae and EOM are normal. Pupils are equal, round, and reactive to light. Right eye exhibits no discharge. Left eye exhibits no discharge. No scleral icterus.   Neck: Normal range of motion. Neck supple. No JVD present.   Cardiovascular: Normal rate, regular rhythm and intact distal pulses.    Murmur (systolic) heard.  Pulmonary/Chest: Effort normal and breath sounds normal. No respiratory distress.   Abdominal: Soft. Bowel sounds are normal. She exhibits no distension. There is no tenderness.   Musculoskeletal: Normal range of motion. She exhibits no edema or tenderness.   Neurological: She is alert and oriented to person, place, and time. She has normal reflexes. No cranial nerve deficit. She exhibits normal muscle tone. Coordination normal.    Skin: Skin is warm and dry. She is not diaphoretic.   Psychiatric: She has a normal mood and affect. Her behavior is normal. Judgment and thought content normal.   Nursing note and vitals reviewed.   Lab Results   Component Value Date/Time    WBC 5.4 10/24/2017 04:30 AM    HEMOGLOBIN 11.6 (L) 10/24/2017 04:30 AM    HEMATOCRIT 34.7 (L) 10/24/2017 04:30 AM    PLATELETCT 245 10/24/2017 04:30 AM     Lab Results   Component Value Date/Time    SODIUM 137 10/24/2017 04:31 AM    POTASSIUM 3.9 10/24/2017 04:31 AM    CHLORIDE 102 10/24/2017 04:31 AM    CO2 29 10/24/2017 04:31 AM    GLUCOSE 97 10/24/2017 04:31 AM    BUN 15 10/24/2017 04:31 AM    CREATININE 0.55 10/24/2017 04:31 AM    CREATININE 1.0 04/30/2008 12:30 PM      Assessment/Plan    * CVA (cerebral vascular accident) (CMS-[facility])- (present on admission)   Assessment & Plan    Symptoms resolved, suspect previous syncopal episode earlier this month may have been due to this as well  Time of onset unknown, not TPA candidate  Was on , change anti-platelet to plavix  Atorvastatin  PT/OT/Speech- rec rehab- order placed        Expressive aphasia- (present on admission)   Assessment & Plan    Resolved see above        Personal history of ovarian cancer- (present on admission)   Assessment & Plan    Followed by Dr. Vee  Has outpatient PET scan scheduled on 10/23, will see if able to get her there tomorrow        Systolic murmur- (present on admission)   Assessment & Plan    Recent echo shows only aortic sclerosis             Reviewed items::  Labs reviewed, Radiology images reviewed and Medications reviewed  Michael catheter::  No Michael  DVT prophylaxis - mechanical:  SCDs  Ulcer Prophylaxis::  Not indicated

## 2017-10-25 NOTE — DISCHARGE PLANNING
PMR referral from [facility]        MRI shows A few punctate areas of diffusion in the right basal ganglia and right cerebellar likely representing acute punctate lacunar infarcts. Anticipate post acute services to facilitate a successful transition to home alone in [place]. Marifer has a brother who is able to help out prn. Dr. Fairchild to consult per protocol.   Thank you for the opportunity to participate in this patients care as she prepares to transition to post acute services.

## 2017-10-25 NOTE — DISCHARGE PLANNING
Received page from pts RN [phone]. Requesting to know if HH will be covered. Pt has Medicare HMO, unsure if it is contracted with [facility] [facility], which is the only HH company in pts home area.    This will need to be f/u with in the AM pending acceptance by rehab or SNF.

## 2017-10-25 NOTE — CONSULTS
Medical chart review completed.     Patient is a 86 y.o. year-old female with a past medical history significant for ovarian cancer, overactive bladder, HTN admitted to [facility] on 10/23/2017  7:40 PM with confusion and inability to use phone.  Patient presented to ED and underwent MRI which showed a few punctate areas of diffusion in the right basal ganglia and right cerebellar likely representing acute punctate lacunar infarcts. Per report has generalized weakness without specific hemiplegia.  Patient with echo which showed EF of 75% with no evidence of thrombus.  Carotid ultrasound without significant stenosis. Neurology was consulted and they recommended Plavix and Lipitor.      Patient was evaluated by SLP on 10/24/17 and reportedly close to baseline.  Patient was evaluated on 10/24/17 by PT and found to be CGA with transfers and Gait.  Patient evaluated by OT on 10/24/17 and found to be Kavitha for LB dressing and with balance issues.     PMH:  Past Medical History:   Diagnosis Date   • Arthritis     back   • Cancer (CMS-HCC) 2004    ovarian   • Dental disorder     upper partial   • Essential hypertension, benign    • Generalized anxiety disorder    • Heart murmur     systolic   • Other specified disorder of intestines     diarrhea from some foods   • Overactive bladder    • Pain     low back pain   • Panic attacks    • Reactive airway disease     uses no inhalers   • Symptomatic menopausal or female climacteric states    • Unspecified cataract     bilateral IOL   • Unspecified pleural effusion    • Unspecified urinary incontinence        PSH:  Past Surgical History:   Procedure Laterality Date   • LUMBAR LAMINECTOMY DISKECTOMY  1/27/2015    Performed by Maxwell Lei M.D. at SURGERY [facility]S   • COLONOSCOPY  3/2010    Normal    • OOPHORECTOMY  5/2008       Dr Dee   • THORACOTOMY  01/2008    pleurodesis   • ANGIOGRAM  02/23/2007    cardiac  wnl   • ABDOMINAL EXPLORATION     • ABDOMINAL  HYSTERECTOMY TOTAL      still has 1 ovary   • COLONOSCOPY  1999, 2005    both ok        FAMILY HISTORY:  No family history on file.    MEDICATIONS:  Current Facility-Administered Medications   Medication Dose   • senna-docusate (PERICOLACE or SENOKOT S) 8.6-50 MG per tablet 2 Tab  2 Tab    And   • polyethylene glycol/lytes (MIRALAX) PACKET 1 Packet  1 Packet    And   • magnesium hydroxide (MILK OF MAGNESIA) suspension 30 mL  30 mL    And   • bisacodyl (DULCOLAX) suppository 10 mg  10 mg   • atorvastatin (LIPITOR) tablet 40 mg  40 mg   • latanoprost (XALATAN) 0.005 % ophthalmic solution 1 Drop  1 Drop   • metaxalone (SKELAXIN) tablet 400-800 mg  400-800 mg   • paroxetine (PAXIL) tablet 20 mg  20 mg   • oxybutynin (DITROPAN) tablet 5 mg  5 mg   • clopidogrel (PLAVIX) tablet 75 mg  75 mg       ALLERGIES:  Metrogel; Tessalon [benzonatate]; and Zoloft    PSYCHOSOCIAL HISTORY:  Living Site:  Home  Living With:  self  Caregiver's availability: Brother can help occassionally   Number of stairs:  4  Substance use history:  None      The patient presents  with cognitive deficits and functional deficits in mobility/self-cares/swallowing/speech, and Minimal  de-conditioning. Pre-morbidly, this patient lived in a single level home with Four steps to enter,alone and able to care for self  The patient was evaluated by acute care Physical Therapy, Occupational Therapy and Speech Language Pathology; currently requiring contact-guard assistance for mobility and minimal assistance for ADLs. The patient's current diet is Regular with Thin liquids. The patient is   a Good candidate for an acute inpatient rehabilitation program with a coordinated program of care at an intensity and frequency not available at a lower level of care. This recommendation is based on patient's current need of [place]/CGA for mobility and ADLs.  If patient progresses such that she no longer requires [place]/CG assistance in at least two therapy specialties then she  may more appropriate for home with home health services.    This recommendation is substantiated by the patient's current medical condition with intervention and assessment of medical issues requiring an acute level of care for patient's safety and maximum outcome. A coordinated program of care will be provided by an interdisciplinary team including physical therapy, occupational therapy, physiatry, rehab nursing and rehab psychology. Rehab goals include improved mobility, self-care management, strength and conditioning/endurance, pain management, bowel and bladder management, mood and affect, and safety with independent home management including caregiver training. Estimated length of stay is approximately 10-14 days. Rehab potential: Good. Disposition: to pre-morbid independent living setting with supportive care of patient's brote and community resources. We will continue to follow with you in anticipation of discharge to acute inpatient rehabilitation when medically stable to do so at the discretion of her attending physician. Thank you for allowing us to participate in her care. Please call with any questions regarding this recommendation.    Wilian Fairchild M.D.

## 2017-10-26 PROBLEM — R19.7 DIARRHEA: Chronic | Status: ACTIVE | Noted: 2017-10-26

## 2017-10-26 PROCEDURE — 770020 HCHG ROOM/CARE - TELE (206)

## 2017-10-26 PROCEDURE — 700102 HCHG RX REV CODE 250 W/ 637 OVERRIDE(OP): Performed by: HOSPITALIST

## 2017-10-26 PROCEDURE — 99232 SBSQ HOSP IP/OBS MODERATE 35: CPT | Performed by: INTERNAL MEDICINE

## 2017-10-26 PROCEDURE — A9270 NON-COVERED ITEM OR SERVICE: HCPCS | Performed by: HOSPITALIST

## 2017-10-26 PROCEDURE — 700102 HCHG RX REV CODE 250 W/ 637 OVERRIDE(OP): Performed by: NURSE PRACTITIONER

## 2017-10-26 PROCEDURE — A9270 NON-COVERED ITEM OR SERVICE: HCPCS | Performed by: NURSE PRACTITIONER

## 2017-10-26 RX ADMIN — PAROXETINE HYDROCHLORIDE 20 MG: 20 TABLET, FILM COATED ORAL at 10:27

## 2017-10-26 RX ADMIN — PROPRANOLOL HYDROCHLORIDE 10 MG: 10 TABLET ORAL at 10:27

## 2017-10-26 RX ADMIN — PROPRANOLOL HYDROCHLORIDE 10 MG: 10 TABLET ORAL at 21:21

## 2017-10-26 RX ADMIN — ATORVASTATIN CALCIUM 40 MG: 40 TABLET, FILM COATED ORAL at 21:21

## 2017-10-26 RX ADMIN — CLOPIDOGREL 75 MG: 75 TABLET, FILM COATED ORAL at 10:27

## 2017-10-26 ASSESSMENT — ENCOUNTER SYMPTOMS
DEPRESSION: 0
BLURRED VISION: 0
FOCAL WEAKNESS: 0
DOUBLE VISION: 0
COUGH: 0
VOMITING: 0
HEADACHES: 0
DIZZINESS: 0
SORE THROAT: 0
DIARRHEA: 0
MUSCULOSKELETAL NEGATIVE: 1
TINGLING: 0
NAUSEA: 0
SHORTNESS OF BREATH: 0
PALPITATIONS: 0
CONSTIPATION: 0
FEVER: 0
CHILLS: 0
GASTROINTESTINAL NEGATIVE: 1
PSYCHIATRIC NEGATIVE: 1
BRUISES/BLEEDS EASILY: 0
MYALGIAS: 0
EYES NEGATIVE: 1
RESPIRATORY NEGATIVE: 1
CARDIOVASCULAR NEGATIVE: 1
WEAKNESS: 1

## 2017-10-26 ASSESSMENT — PAIN SCALES - GENERAL: PAINLEVEL_OUTOF10: 0

## 2017-10-26 NOTE — PROGRESS NOTES
Ivone Silva Fall Risk Assessment:     Last Known Fall: No falls  Mobility: No limitations  Medications: No meds  Mental Status/LOC/Awareness: Awake, alert, and oriented to date, place, and person  Toileting Needs: No needs  Volume/Electrolyte Status: No problems  Communication/Sensory: No deficits  Behavior: Appropriate behavior  Ivone Silva Fall Risk Total: 3  Fall Risk Level: NO RISK    Universal Fall Precautions:  call light/belongings in reach, bed in low position and locked, wheelchairs and assistive devices out of sight, siderails up x 2    Fall Risk Level Interventions:    TRIAL (TELE 8, NEURO, MED AMILCAR 5) Moderate Fall Risk Interventions  Place yellow fall risk ID band on patient: verified  Provide patient/family education based on risk assessment : completed  Educate patient/family to call staff for assistance when getting out of bed: completed  Place fall precaution signage outside patient door: verified  Utilize bed/chair fall alarm: verifiedTRIAL (TELE 8, NEURO, [employer] AMILCAR 5) High Fall Risk Interventions  Place yellow fall risk ID band on patient: completed  Provide patient/family education based on risk assessment: completed  Educate patient/family to call staff for assistance when getting out of bed: completed  Place fall precaution signage outside patient door: completed  Place patient in room close to nursing station: completed  Utilize bed/chair fall alarm: completed  Notify charge of high risk for huddle: completed    Patient Specific Interventions:     Medication: review medications with patient and family  Mental Status/LOC/Awareness: reinforce falls education  Toileting: consider obtaining elevated toilet seat or bedside commode (BSC)  Volume/Electrolyte Status: ensure patient remains hydrated  Communication/Sensory: update plan of care on whiteboard  Behavioral: encourage patient to voice feelings and engage patient in daily activities  Mobility: provide comfort measures during transport

## 2017-10-26 NOTE — ASSESSMENT & PLAN NOTE
Has this chronically off/on  Low risk factors for infectious  Takes lomotil at home- cleared to take again here

## 2017-10-26 NOTE — CARE PLAN
Problem: Safety  Goal: Will remain free from injury  Outcome: PROGRESSING AS EXPECTED  Fall precautions In place,pt. Calls appropriately for assistance.    Problem: Pain Management  Goal: Pain level will decrease to patient's comfort goal  Outcome: PROGRESSING AS EXPECTED  Pt. Denies pain at this time.

## 2017-10-26 NOTE — PROGRESS NOTES
Ivone Silva Fall Risk Assessment:     Last Known Fall: Within the last six months  Mobility: No limitations  Medications: Cardiovascular or central nervous system meds  Mental Status/LOC/Awareness: Awake, alert, and oriented to date, place, and person  Toileting Needs: No needs  Volume/Electrolyte Status: No problems  Communication/Sensory: Visual (Glasses)/hearing deficit  Behavior: Appropriate behavior  Ivone Silva Fall Risk Total: 7  Fall Risk Level: LOW RISK    Universal Fall Precautions:  call light/belongings in reach, bed in low position and locked, wheelchairs and assistive devices out of sight, siderails up x 2, use non-slip footwear, adequate lighting, clutter free and spill free environment, educate on level of risk, educate to call for assistance    Fall Risk Level Interventions:   TRIAL ([email or web address], NEURO, MED AMILCAR 5) Low Fall Risk Interventions  Place yellow fall risk ID band on patient: verified  Provide patient/family education based on risk assessment: completed  Educate patient/family to call staff for assistance when getting out of bed: completed  Place fall precaution signage outside patient door: verifiedTRIAL ([email or web address], NEURO, MED AMILCAR 5) Moderate Fall Risk Interventions  Place yellow fall risk ID band on patient: verified  Provide patient/family education based on risk assessment : completed  Educate patient/family to call staff for assistance when getting out of bed: completed  Place fall precaution signage outside patient door: verified  Utilize bed/chair fall alarm: verifiedTRIAL ([email or web address], NEURO, [employer] AMILCAR 5) High Fall Risk Interventions  Place yellow fall risk ID band on patient: completed  Provide patient/family education based on risk assessment: completed  Educate patient/family to call staff for assistance when getting out of bed: completed  Place fall precaution signage outside patient door: completed  Place patient in room close to nursing station: completed  Utilize bed/chair fall  alarm: completed  Notify charge of high risk for huddle: completed    Patient Specific Interventions:     Medication: review medications with patient and family  Mental Status/LOC/Awareness: reinforce falls education  Toileting: provide frquent toileting  Volume/Electrolyte Status: ensure patient remains hydrated  Communication/Sensory: provide communication alternatives/  Behavioral: engage patient in daily activities and administer medication as ordered  Mobility: utilize bed/chair fall alarm

## 2017-10-26 NOTE — DISCHARGE SUMMARY
CHIEF COMPLAINT ON ADMISSION  No chief complaint on file.      CODE STATUS  DNR    HPI & HOSPITAL COURSE  This is a 86 y.o. female here with 86 y.o.-year-old female admitted 10/23/2017 with history of ovarian cancer 18 years ago, she was recently admitted to the hospital for severe dehydration with a syncopal episode/confusion.  Patient woke up and she felt that something was off and became concerned when she was unable to operate the  or dial a phone number.  She was having some trouble with her speech.  She had no weakness in her extremities.  No difficulty ambulating.  She normally ambulates with a walker.  She denied difficulty with her balance.  Speech was not slurred, but she did endorse word finding difficulty.    She was transferred to [facility] as MRI and neurology consultation unavailable at her local facility. MRI was performed and she is found to have tiny acute lacunar right infarcts. She did have a very mild left facial droop at admission. Dr Avila with neurology consulted and cleared her for discharge to home with Stroke bridge clinic follow up and escalate her asa to plavix and add on a statin. Her recent echocardiogram was reviewed and is overall not significant and carotids are also unremarkable.   PT and OT evaluated and determined that she does have some weakness and balance concerns and would benefit from a short rehab stay. She is agreeable to rehab today. Dr Fairchild has evaluated and feels she is a good candidate. Will have to reschedule her PET scan that she was due to get 10/25.    Therefore, she is discharged in good and stable condition with close outpatient follow-up.    SPECIFIC OUTPATIENT FOLLOW-UP  [facility] within 1-2 weeks.  Your oncologist within 1-2 weeks.    DISCHARGE PROBLEM LIST  Principal Problem:    CVA (cerebral vascular accident) (CMS-HCC) POA: Yes  Active Problems:    Expressive aphasia POA: Yes    Personal history of ovarian  cancer POA: Yes    Systolic murmur POA: Yes    DNR (do not resuscitate) POA: Yes  Resolved Problems:    * No resolved hospital problems. *      FOLLOW UP  Future Appointments  Date Time Provider Department Center   11/3/2017 10:30 AM [address]SEY 1 W75K 75 YONNY Thomas M.D.  [address]  [facility] [address]  [phone]    Schedule an appointment as soon as possible for a visit      [facility]    Schedule an appointment as soon as possible for a visit        MEDICATIONS ON DISCHARGE   Marifer Eldridge   Home Medication Instructions RATNA:[id number]    Printed on:10/26/17 0746   Medication Information                      alendronate (FOSAMAX) 70 MG Tab  TAKE 1 TABLET EVERY 7 DAYS             atorvastatin (LIPITOR) 40 MG Tab  Take 1 Tab by mouth every evening.             clopidogrel (PLAVIX) 75 MG Tab  Take 1 Tab by mouth every day.             diphenoxylate-atropine (LOMOTIL) 2.5-0.025 MG Tab  TAKE  (1)  TABLET  FOUR TIMES DAILY AS NEEDED FOR DIARRHEA.             hydrocodone-acetaminophen (NORCO) 5-325 MG Tab per tablet  Take 1-2 Tabs by mouth every four hours as needed.             latanoprost (XALATAN) 0.005 % SOLN  Place 1 Drop in right eye every evening.             magnesium oxide (MAG-OX) 400 (241.3 Mg) MG Tab tablet  Take 1 Tab by mouth every day.             metaxalone 400 MG Tab  Take 400-800 mg by mouth 3 times a day as needed for Muscle Spasms.             oxybutynin (DITROPAN) 5 MG Tab  Take 5 mg by mouth 4 times a day as needed.             paroxetine (PAXIL) 20 MG Tab  TAKE 1 TABLET DAILY WITH   FOOD             potassium chloride SA (KDUR) 20 MEQ Tab CR  Take 1 Tab by mouth every day.             propranolol (INDERAL) 10 MG Tab  TAKE (1) TABLET BY MOUTH TWICE DAILY.             triamterene/hctz (MAXZIDE-25/DYAZIDE) 37.5-25 MG Cap  TAKE 1 CAPSULE EVERY       MORNING             vitamin D (CHOLECALCIFEROL) 1000 UNIT TABS  Take 1 Tab by mouth every day.                  DIET  Orders Placed This Encounter   Procedures   • DIET ORDER     Standing Status:   Standing     Number of Occurrences:   1     Order Specific Question:   Diet:     Answer:   Regular [1]       ACTIVITY  As tolerated and directed by rehab.  Weight bearing as tolerated      CONSULTATIONS  Dr Avila-Neurology    PROCEDURES  None    LABORATORY  Lab Results   Component Value Date/Time    SODIUM 137 10/24/2017 04:31 AM    POTASSIUM 3.9 10/24/2017 04:31 AM    CHLORIDE 102 10/24/2017 04:31 AM    CO2 29 10/24/2017 04:31 AM    GLUCOSE 97 10/24/2017 04:31 AM    BUN 15 10/24/2017 04:31 AM    CREATININE 0.55 10/24/2017 04:31 AM    CREATININE 1.0 04/30/2008 12:30 PM        Lab Results   Component Value Date/Time    WBC 5.4 10/24/2017 04:30 AM    HEMOGLOBIN 11.6 (L) 10/24/2017 04:30 AM    HEMATOCRIT 34.7 (L) 10/24/2017 04:30 AM    PLATELETCT 245 10/24/2017 04:30 AM        Total time of the discharge process exceeds 36 minutes

## 2017-10-26 NOTE — DISCHARGE PLANNING
Transitional Care Navigator:    TCN met with patient and talked to friend over the phone to discuss transitional care services for discharge planning.  IRF level of care has been recommended.  TCN explained IRF vs SNF levels of care in detail.  Pt prefers SNF over IRF. Choice is [facility] in [place].  Choice form completed and faxed to [facility]. SW and TCC from [facility].  TCN will follow-up as needed.

## 2017-10-26 NOTE — CARE PLAN
Problem: Venous Thromboembolism (VTW)/Deep Vein Thrombosis (DVT) Prevention:  Goal: Patient will participate in Venous Thrombosis (VTE)/Deep Vein Thrombosis (DVT)Prevention Measures  Outcome: PROGRESSING AS EXPECTED      Problem: Bowel/Gastric:  Goal: Normal bowel function is maintained or improved  Outcome: PROGRESSING AS EXPECTED    Goal: Will not experience complications related to bowel motility  Outcome: PROGRESSING AS EXPECTED

## 2017-10-26 NOTE — PROGRESS NOTES
Hospital Medicine Interval Note  Date of Service:  10/26/2017    Chief Complaint  86 y.o.-year-old female admitted 10/23/2017 with history of ovarian cancer 18 years ago, she was recently admitted to the hospital for severe dehydration with a syncopal episode/confusion.  Patient woke up and she felt that something was off and became concerned when she was unable to operate the  or dial a phone number.  She was having some trouble with her speech.  She had no weakness in her extremities.  No difficulty ambulating.  She normally ambulates with a walker.  She denied difficulty with her balance.  Speech was not slurred, but she did endorse word finding difficulty.    She was transferred to [facility] as MRI and neurology consultation unavailable at her local facility. MRI was performed and she is found to have tiny acute lacunar right infarcts. She did have a very mild left facial droop at admission. Dr Avila with neurology consulted and cleared her for discharge to home with Stroke bridge clinic follow up and escalate her asa to plavix and add on a statin. Her recent echocardiogram was reviewed and is overall not significant and carotids are also unremarkable.   PT and OT evaluated and determined that she does have some weakness and balance concerns and would benefit from a short rehab stay.    10/25- doing well without complaints. Agrees to rehab today. Rehab physician has evaluated and feels she is a good candidate. Will have to reschedule her PET scan.    10/26- doing well working with nursing and PT/OT, rehab approved- waiting for insurance auth    Consultants/Specialty  Dr Avila- neuro    Disposition  Rehab insurance auth pending. Discharge orders and summary completed if pt does get accepted and is ready for transfer. cont pt/ot/nursing     Review of Systems   Constitutional: Negative for chills, fever and malaise/fatigue.   HENT: Negative.  Negative for congestion and sore  throat.    Eyes: Negative.  Negative for blurred vision and double vision.   Respiratory: Negative.  Negative for cough and shortness of breath.    Cardiovascular: Negative.  Negative for chest pain, palpitations and leg swelling.   Gastrointestinal: Negative.  Negative for constipation, diarrhea, nausea and vomiting.   Genitourinary: Negative.  Negative for dysuria.   Musculoskeletal: Negative.  Negative for joint pain and myalgias.   Skin: Negative.  Negative for itching and rash.   Neurological: Positive for weakness (gen , off balance occasionally). Negative for dizziness, tingling, focal weakness and headaches.   Endo/Heme/Allergies: Negative.  Does not bruise/bleed easily.   Psychiatric/Behavioral: Negative.  Negative for depression.        Mildy anxious to get moving to next step- rehab      Physical Exam Laboratory/Imaging   Vitals:    10/25/17 1600 10/25/17 2000 10/26/17 0400 10/26/17 0800   BP: 131/59 120/61 135/66 148/80   Pulse: 77 (!) 103 70 69   Resp: 16 18 20 16   Temp: 36.4 °C (97.6 °F) 36.8 °C (98.2 °F) 36.8 °C (98.2 °F) 36.7 °C (98.1 °F)   SpO2: 92% 91% 90% 91%   Weight:       Height:         Physical Exam   Constitutional: She is oriented to person, place, and time. She appears well-developed and well-nourished. No distress.   Appears comfortable, well groomed   HENT:   Head: Normocephalic.   Mouth/Throat: Oropharynx is clear and moist. No oropharyngeal exudate.   Eyes: Conjunctivae are normal.   Neck: Normal range of motion. Neck supple. No JVD present.   Cardiovascular: Normal rate, regular rhythm and intact distal pulses.    Murmur (systolic) heard.  Pulmonary/Chest: Effort normal and breath sounds normal.   Abdominal: Soft. Bowel sounds are normal. She exhibits no distension.   Musculoskeletal: Normal range of motion. She exhibits no edema or tenderness.   Neurological: She is alert and oriented to person, place, and time. No cranial nerve deficit. Coordination normal.   Skin: Skin is warm and  dry.   Psychiatric: Her behavior is normal. Judgment and thought content normal.   Mildly anxious   Nursing note and vitals reviewed.   Lab Results   Component Value Date/Time    WBC 5.4 10/24/2017 04:30 AM    HEMOGLOBIN 11.6 (L) 10/24/2017 04:30 AM    HEMATOCRIT 34.7 (L) 10/24/2017 04:30 AM    PLATELETCT 245 10/24/2017 04:30 AM     Lab Results   Component Value Date/Time    SODIUM 137 10/24/2017 04:31 AM    POTASSIUM 3.9 10/24/2017 04:31 AM    CHLORIDE 102 10/24/2017 04:31 AM    CO2 29 10/24/2017 04:31 AM    GLUCOSE 97 10/24/2017 04:31 AM    BUN 15 10/24/2017 04:31 AM    CREATININE 0.55 10/24/2017 04:31 AM    CREATININE 1.0 04/30/2008 12:30 PM      Assessment/Plan    * CVA (cerebral vascular accident) (CMS-HCC)- (present on admission)   Assessment & Plan    Symptoms resolved, suspect previous syncopal episode earlier this month may have been due to this as well  Time of onset unknown, not TPA candidate  Was on , change anti-platelet to plavix  Atorvastatin  PT/OT/Speech- rec rehab  Rehab eval done and approved- awaiting ins acceptance- pt anxious to go        Expressive aphasia- (present on admission)   Assessment & Plan    Resolved see above        Personal history of ovarian cancer- (present on admission)   Assessment & Plan    Followed by Dr. Vee  Has outpatient PET scan scheduled outpt, not able to make it with her being admitted  Will need to reschedule the PET scan        Diarrhea   Assessment & Plan    Has this chronically off/on  Low risk factors for infectious  Takes lomotil at home- cleared to take again here        Systolic murmur- (present on admission)   Assessment & Plan    Recent echo shows only aortic sclerosis  Ok to cont propanolol             Reviewed items::  Labs reviewed, Radiology images reviewed and Medications reviewed  Michael catheter::  No Michael  DVT prophylaxis - mechanical:  SCDs  Ulcer Prophylaxis::  Not indicated  REHAB- assessed and rcvd rehab services- rec to transfer to  rehab facility

## 2017-10-27 PROCEDURE — 770006 HCHG ROOM/CARE - MED/SURG/GYN SEMI*

## 2017-10-27 PROCEDURE — A9270 NON-COVERED ITEM OR SERVICE: HCPCS | Performed by: NURSE PRACTITIONER

## 2017-10-27 PROCEDURE — A9270 NON-COVERED ITEM OR SERVICE: HCPCS | Performed by: HOSPITALIST

## 2017-10-27 PROCEDURE — 97535 SELF CARE MNGMENT TRAINING: CPT

## 2017-10-27 PROCEDURE — 99232 SBSQ HOSP IP/OBS MODERATE 35: CPT | Performed by: INTERNAL MEDICINE

## 2017-10-27 PROCEDURE — 700111 HCHG RX REV CODE 636 W/ 250 OVERRIDE (IP): Performed by: INTERNAL MEDICINE

## 2017-10-27 PROCEDURE — 700102 HCHG RX REV CODE 250 W/ 637 OVERRIDE(OP): Performed by: HOSPITALIST

## 2017-10-27 PROCEDURE — 700102 HCHG RX REV CODE 250 W/ 637 OVERRIDE(OP): Performed by: NURSE PRACTITIONER

## 2017-10-27 RX ADMIN — LATANOPROST 1 DROP: 50 SOLUTION OPHTHALMIC at 19:50

## 2017-10-27 RX ADMIN — PROPRANOLOL HYDROCHLORIDE 10 MG: 10 TABLET ORAL at 08:51

## 2017-10-27 RX ADMIN — PROPRANOLOL HYDROCHLORIDE 10 MG: 10 TABLET ORAL at 19:50

## 2017-10-27 RX ADMIN — ATORVASTATIN CALCIUM 40 MG: 40 TABLET, FILM COATED ORAL at 19:50

## 2017-10-27 RX ADMIN — CLOPIDOGREL 75 MG: 75 TABLET, FILM COATED ORAL at 08:51

## 2017-10-27 RX ADMIN — ENOXAPARIN SODIUM 40 MG: 100 INJECTION SUBCUTANEOUS at 08:51

## 2017-10-27 RX ADMIN — PAROXETINE HYDROCHLORIDE 20 MG: 20 TABLET, FILM COATED ORAL at 08:51

## 2017-10-27 ASSESSMENT — ENCOUNTER SYMPTOMS
SHORTNESS OF BREATH: 0
HEADACHES: 0
MUSCULOSKELETAL NEGATIVE: 1
EYES NEGATIVE: 1
DIARRHEA: 0
BRUISES/BLEEDS EASILY: 0
DOUBLE VISION: 0
CARDIOVASCULAR NEGATIVE: 1
PALPITATIONS: 0
NAUSEA: 0
FOCAL WEAKNESS: 0
CONSTIPATION: 0
FEVER: 0
DIZZINESS: 0
WEAKNESS: 1
CHILLS: 0
TINGLING: 0
MYALGIAS: 0
COUGH: 0
VOMITING: 0
BLURRED VISION: 0
RESPIRATORY NEGATIVE: 1
PSYCHIATRIC NEGATIVE: 1
GASTROINTESTINAL NEGATIVE: 1
SORE THROAT: 0
DEPRESSION: 0

## 2017-10-27 ASSESSMENT — COGNITIVE AND FUNCTIONAL STATUS - GENERAL
PERSONAL GROOMING: A LITTLE
SUGGESTED CMS G CODE MODIFIER DAILY ACTIVITY: CJ
HELP NEEDED FOR BATHING: A LITTLE
DRESSING REGULAR LOWER BODY CLOTHING: A LITTLE
DAILY ACTIVITIY SCORE: 20
TOILETING: A LITTLE

## 2017-10-27 ASSESSMENT — PAIN SCALES - GENERAL
PAINLEVEL_OUTOF10: 0
PAINLEVEL_OUTOF10: 0

## 2017-10-27 NOTE — DISCHARGE SUMMARY
CHIEF COMPLAINT ON ADMISSION  No chief complaint on file.      CODE STATUS  DNR    HPI & HOSPITAL COURSE  This is a 86 y.o. female here with 86 y.o.-year-old female admitted 10/23/2017 with history of ovarian cancer 18 years ago, she was recently admitted to the hospital for severe dehydration with a syncopal episode/confusion.  Patient woke up and she felt that something was off and became concerned when she was unable to operate the  or dial a phone number.  She was having some trouble with her speech.  She had no weakness in her extremities.  No difficulty ambulating.  She normally ambulates with a walker.  She denied difficulty with her balance.  Speech was not slurred, but she did endorse word finding difficulty.    She was transferred to [facility] as MRI and neurology consultation unavailable at her local facility. MRI was performed and she is found to have tiny acute lacunar right infarcts. She did have a very mild left facial droop at admission. Dr Avila with neurology consulted and cleared her for discharge to home with Stroke bridge clinic follow up and escalate her asa to plavix and add on a statin. Her recent echocardiogram was reviewed and is overall not significant and carotids are also unremarkable.   PT and OT evaluated and determined that she does have some weakness and balance concerns and would benefit from a short rehab stay. She decided against rehab even though she was accepted and insurance approved. She is agreeable to [facility] only today. She will discharge to their facility if transport and a bed is available. Will have to reschedule her PET scan that she was due to get 10/25.    Therefore, she is discharged in good and stable condition with close outpatient follow-up.    SPECIFIC OUTPATIENT FOLLOW-UP  [facility] within 1-2 weeks.  Your oncologist within 1-2 weeks.    DISCHARGE PROBLEM LIST  Principal Problem:    CVA (cerebral  vascular accident) (CMS-HCC) POA: Yes  Active Problems:    Expressive aphasia POA: Yes    Personal history of ovarian cancer POA: Yes    Systolic murmur POA: Yes    DNR (do not resuscitate) POA: Yes    Diarrhea (Chronic) POA: No  Resolved Problems:    * No resolved hospital problems. *      FOLLOW UP  Future Appointments  Date Time Provider Department Center   11/3/2017 10:30 AM [address] 1 W75K 75 YONNY Thomas M.D.  [address]  [address]  [facility] [address]  [phone]    Schedule an appointment as soon as possible for a visit      [facility]    Schedule an appointment as soon as possible for a visit        MEDICATIONS ON DISCHARGE   Ronen Eldridgelanda   Home Medication Instructions RATNA:[id number]    Printed on:10/26/17 0746   Medication Information                      alendronate (FOSAMAX) 70 MG Tab  TAKE 1 TABLET EVERY 7 DAYS             atorvastatin (LIPITOR) 40 MG Tab  Take 1 Tab by mouth every evening.             clopidogrel (PLAVIX) 75 MG Tab  Take 1 Tab by mouth every day.             diphenoxylate-atropine (LOMOTIL) 2.5-0.025 MG Tab  TAKE  (1)  TABLET  FOUR TIMES DAILY AS NEEDED FOR DIARRHEA.             hydrocodone-acetaminophen (NORCO) 5-325 MG Tab per tablet  Take 1-2 Tabs by mouth every four hours as needed.             latanoprost (XALATAN) 0.005 % SOLN  Place 1 Drop in right eye every evening.             magnesium oxide (MAG-OX) 400 (241.3 Mg) MG Tab tablet  Take 1 Tab by mouth every day.             metaxalone 400 MG Tab  Take 400-800 mg by mouth 3 times a day as needed for Muscle Spasms.             oxybutynin (DITROPAN) 5 MG Tab  Take 5 mg by mouth 4 times a day as needed.             paroxetine (PAXIL) 20 MG Tab  TAKE 1 TABLET DAILY WITH   FOOD             potassium chloride SA (KDUR) 20 MEQ Tab CR  Take 1 Tab by mouth every day.             propranolol (INDERAL) 10 MG Tab  TAKE (1) TABLET BY MOUTH TWICE DAILY.             triamterene/hctz (MAXZIDE-25/DYAZIDE)  37.5-25 MG Cap  TAKE 1 CAPSULE EVERY       MORNING             vitamin D (CHOLECALCIFEROL) 1000 UNIT TABS  Take 1 Tab by mouth every day.                 DIET  Orders Placed This Encounter   Procedures   • DIET ORDER     Standing Status:   Standing     Number of Occurrences:   1     Order Specific Question:   Diet:     Answer:   Regular [1]       ACTIVITY  As tolerated and directed by rehab.  Weight bearing as tolerated      CONSULTATIONS  Dr Avila-Neurology    PROCEDURES  None    LABORATORY  Lab Results   Component Value Date/Time    SODIUM 137 10/24/2017 04:31 AM    POTASSIUM 3.9 10/24/2017 04:31 AM    CHLORIDE 102 10/24/2017 04:31 AM    CO2 29 10/24/2017 04:31 AM    GLUCOSE 97 10/24/2017 04:31 AM    BUN 15 10/24/2017 04:31 AM    CREATININE 0.55 10/24/2017 04:31 AM    CREATININE 1.0 04/30/2008 12:30 PM        Lab Results   Component Value Date/Time    WBC 5.4 10/24/2017 04:30 AM    HEMOGLOBIN 11.6 (L) 10/24/2017 04:30 AM    HEMATOCRIT 34.7 (L) 10/24/2017 04:30 AM    PLATELETCT 245 10/24/2017 04:30 AM        Total time of the discharge process exceeds 36 minutes

## 2017-10-27 NOTE — PROGRESS NOTES
Hospital Medicine Interval Note  Date of Service:  10/27/2017    Chief Complaint  86 y.o.-year-old female admitted 10/23/2017 with history of ovarian cancer 18 years ago, she was recently admitted to the hospital for severe dehydration with a syncopal episode/confusion.  Patient woke up and she felt that something was off and became concerned when she was unable to operate the  or dial a phone number.  She was having some trouble with her speech.  She had no weakness in her extremities.  No difficulty ambulating.  She normally ambulates with a walker.  She denied difficulty with her balance.  Speech was not slurred, but she did endorse word finding difficulty.    She was transferred to [facility] as MRI and neurology consultation unavailable at her local facility. MRI was performed and she is found to have tiny acute lacunar right infarcts. She did have a very mild left facial droop at admission. Dr Avila with neurology consulted and cleared her for discharge to home with Stroke bridge clinic follow up and escalate her asa to plavix and add on a statin. Her recent echocardiogram was reviewed and is overall not significant and carotids are also unremarkable.   PT and OT evaluated and determined that she does have some weakness and balance concerns and would benefit from a short rehab stay.    10/25- doing well without complaints. Agrees to rehab today. Rehab physician has evaluated and feels she is a good candidate. Will have to reschedule her PET scan.    10/26- doing well working with nursing and PT/OT, rehab approved- waiting for insurance auth    10/27- was accepted and approved for rehab, pt changed mind and refused rehab- will only accept [facility]- she is accepted there but not clear if there is a bed and transport available. Pt has no new complaints, VSS.    Consultants/Specialty  Dr Avila- neuro    Disposition  Discharge orders and summary completed if pt does get  accepted and is ready for transfer. cont pt/ot/nursing     Review of Systems   Constitutional: Negative for chills, fever and malaise/fatigue.   HENT: Negative.  Negative for congestion and sore throat.    Eyes: Negative.  Negative for blurred vision and double vision.   Respiratory: Negative.  Negative for cough and shortness of breath.    Cardiovascular: Negative.  Negative for chest pain, palpitations and leg swelling.   Gastrointestinal: Negative.  Negative for constipation, diarrhea, nausea and vomiting.   Genitourinary: Negative.  Negative for dysuria.   Musculoskeletal: Negative.  Negative for joint pain and myalgias.   Skin: Negative.  Negative for itching and rash.   Neurological: Positive for weakness (gen , off balance occasionally). Negative for dizziness, tingling, focal weakness and headaches.   Endo/Heme/Allergies: Negative.  Does not bruise/bleed easily.   Psychiatric/Behavioral: Negative.  Negative for depression.      Physical Exam Laboratory/Imaging   Vitals:    10/26/17 1600 10/26/17 1959 10/26/17 2330 10/27/17 0400   BP: 137/70 136/79 120/60 129/64   Pulse: 64 95 67 65   Resp: 16 16 16 16   Temp: 36.9 °C (98.5 °F) 37.2 °C (98.9 °F) 36.8 °C (98.2 °F) 36.4 °C (97.6 °F)   SpO2: 93% 94% 94% 93%   Weight:       Height:         Physical Exam   Constitutional: She is oriented to person, place, and time. She appears well-developed and well-nourished. No distress.   Appears comfortable, well groomed   HENT:   Head: Normocephalic.   Mouth/Throat: Oropharynx is clear and moist. No oropharyngeal exudate.   Eyes: Conjunctivae are normal.   Neck: Normal range of motion. Neck supple. No JVD present.   Cardiovascular: Normal rate, regular rhythm and intact distal pulses.    Murmur (systolic) heard.  Pulmonary/Chest: Effort normal and breath sounds normal.   Abdominal: Soft. Bowel sounds are normal. She exhibits no distension.   Musculoskeletal: Normal range of motion. She exhibits no edema or tenderness.    Neurological: She is alert and oriented to person, place, and time. No cranial nerve deficit. Coordination normal.   Skin: Skin is warm and dry.   Psychiatric: Her behavior is normal. Judgment and thought content normal.   Appears calm, awaiting snf transport acceptance   Nursing note and vitals reviewed.   Lab Results   Component Value Date/Time    WBC 5.4 10/24/2017 04:30 AM    HEMOGLOBIN 11.6 (L) 10/24/2017 04:30 AM    HEMATOCRIT 34.7 (L) 10/24/2017 04:30 AM    PLATELETCT 245 10/24/2017 04:30 AM     Lab Results   Component Value Date/Time    SODIUM 137 10/24/2017 04:31 AM    POTASSIUM 3.9 10/24/2017 04:31 AM    CHLORIDE 102 10/24/2017 04:31 AM    CO2 29 10/24/2017 04:31 AM    GLUCOSE 97 10/24/2017 04:31 AM    BUN 15 10/24/2017 04:31 AM    CREATININE 0.55 10/24/2017 04:31 AM    CREATININE 1.0 04/30/2008 12:30 PM      Assessment/Plan    * CVA (cerebral vascular accident) (CMS-HCC)- (present on admission)   Assessment & Plan    Symptoms resolved, suspect previous syncopal episode earlier this month may have been due to this as well  Time of onset unknown, not TPA candidate  Was on , change anti-platelet to plavix  Atorvastatin  PT/OT/Speech- rec rehab  Refused rehab, awaiting [facility] transportation and bed arrangements- appreciate SW assistance        Expressive aphasia- (present on admission)   Assessment & Plan    Resolved see above        Personal history of ovarian cancer- (present on admission)   Assessment & Plan    Followed by Dr. Vee  Has outpatient PET scan scheduled outpt, not able to make it with her being admitted  Will need to reschedule the PET scan        Diarrhea   Assessment & Plan    Has this chronically off/on  Low risk factors for infectious  Takes lomotil at home- cleared to take again here        Systolic murmur- (present on admission)   Assessment & Plan    Recent echo shows only aortic sclerosis  Ok to cont propanolol             Reviewed items::  Labs reviewed,  Radiology images reviewed and Medications reviewed  Michael catheter::  No Michael  DVT prophylaxis - mechanical:  SCDs  Ulcer Prophylaxis::  Not indicated  REHAB- assessed and rcvd rehab services- rec to transfer to rehab facility

## 2017-10-27 NOTE — THERAPY
"Occupational Therapy Treatment completed with focus on ADLs and ADL transfers.  Functional Status:  Pt just completed seated shower with nsg upon OT's arrival.  Pt reports she required assist with bathing.  Pt was walking out of bathroom without AD and had LOB backwards requiring assist to regain balance.  Pt required reminders to use FWW even though she reports she uses it all the time at home.  Pt donned socks EOB with CGA.  Pt brushed her hair standing at sink with CGA.  Pt walked unit with FWW then left up in chair.  Plan of Care: Will benefit from Occupational Therapy 5 times per week  Discharge Recommendations:  Equipment Will Continue to Assess for Equipment Needs. Post-acute therapy Discharge to a transitional care facility for continued skilled therapy services.    See \"Rehab Therapy-Acute\" Patient Summary Report for complete documentation.   "

## 2017-10-27 NOTE — DISCHARGE PLANNING
Attempted to contact [facility] regarding bed availability, however, no answer.Voicemail left for Yenifer.

## 2017-10-28 PROCEDURE — 700111 HCHG RX REV CODE 636 W/ 250 OVERRIDE (IP): Performed by: INTERNAL MEDICINE

## 2017-10-28 PROCEDURE — 99232 SBSQ HOSP IP/OBS MODERATE 35: CPT | Performed by: INTERNAL MEDICINE

## 2017-10-28 PROCEDURE — 700102 HCHG RX REV CODE 250 W/ 637 OVERRIDE(OP): Performed by: NURSE PRACTITIONER

## 2017-10-28 PROCEDURE — A9270 NON-COVERED ITEM OR SERVICE: HCPCS | Performed by: HOSPITALIST

## 2017-10-28 PROCEDURE — 770006 HCHG ROOM/CARE - MED/SURG/GYN SEMI*

## 2017-10-28 PROCEDURE — A9270 NON-COVERED ITEM OR SERVICE: HCPCS | Performed by: NURSE PRACTITIONER

## 2017-10-28 PROCEDURE — 700102 HCHG RX REV CODE 250 W/ 637 OVERRIDE(OP): Performed by: HOSPITALIST

## 2017-10-28 RX ADMIN — CLOPIDOGREL 75 MG: 75 TABLET, FILM COATED ORAL at 07:16

## 2017-10-28 RX ADMIN — PROPRANOLOL HYDROCHLORIDE 10 MG: 10 TABLET ORAL at 07:16

## 2017-10-28 RX ADMIN — DIPHENOXYLATE HYDROCHLORIDE AND ATROPINE SULFATE 1 TABLET: 2.5; .025 TABLET ORAL at 20:45

## 2017-10-28 RX ADMIN — PAROXETINE HYDROCHLORIDE 20 MG: 20 TABLET, FILM COATED ORAL at 07:16

## 2017-10-28 RX ADMIN — PROPRANOLOL HYDROCHLORIDE 10 MG: 10 TABLET ORAL at 20:45

## 2017-10-28 RX ADMIN — LATANOPROST 1 DROP: 50 SOLUTION OPHTHALMIC at 20:45

## 2017-10-28 RX ADMIN — DIPHENOXYLATE HYDROCHLORIDE AND ATROPINE SULFATE 1 TABLET: 2.5; .025 TABLET ORAL at 07:16

## 2017-10-28 RX ADMIN — ATORVASTATIN CALCIUM 40 MG: 40 TABLET, FILM COATED ORAL at 20:45

## 2017-10-28 RX ADMIN — ENOXAPARIN SODIUM 40 MG: 100 INJECTION SUBCUTANEOUS at 07:16

## 2017-10-28 ASSESSMENT — ENCOUNTER SYMPTOMS
DOUBLE VISION: 0
MYALGIAS: 0
HEADACHES: 0
TINGLING: 0
EYES NEGATIVE: 1
VOMITING: 0
BLURRED VISION: 0
FEVER: 0
WEAKNESS: 1
DIZZINESS: 0
DIARRHEA: 0
PSYCHIATRIC NEGATIVE: 1
SHORTNESS OF BREATH: 0
SORE THROAT: 0
MUSCULOSKELETAL NEGATIVE: 1
CONSTIPATION: 0
NAUSEA: 0
COUGH: 0
DEPRESSION: 0
RESPIRATORY NEGATIVE: 1
CARDIOVASCULAR NEGATIVE: 1
CHILLS: 0
GASTROINTESTINAL NEGATIVE: 1
FOCAL WEAKNESS: 0
PALPITATIONS: 0
BRUISES/BLEEDS EASILY: 0

## 2017-10-28 ASSESSMENT — PAIN SCALES - GENERAL
PAINLEVEL_OUTOF10: 0
PAINLEVEL_OUTOF10: 0

## 2017-10-28 NOTE — PROGRESS NOTES
Ivone Silva Fall Risk Assessment:     Last Known Fall: No falls  Mobility: Immobilized/requires assist of one person  Medications: Cardiovascular or central nervous system meds  Mental Status/LOC/Awareness: Awake, alert, and oriented to date, place, and person  Toileting Needs: No needs  Volume/Electrolyte Status: No problems  Communication/Sensory: No deficits  Behavior: Appropriate behavior  Ivone Silva Fall Risk Total: 6  Fall Risk Level: NO RISK    Universal Fall Precautions:  call light/belongings in reach, bed in low position and locked, siderails up x 2, clutter free and spill free environment, educate to call for assistance, educate on level of risk    Fall Risk Level Interventions:   TRIAL (TELE 8, NEURO, MED AMILCAR 5) Low Fall Risk Interventions  Place yellow fall risk ID band on patient: verified  Provide patient/family education based on risk assessment: completed  Educate patient/family to call staff for assistance when getting out of bed: completed  Place fall precaution signage outside patient door: verifiedTRIAL (TELE 8, NEURO, MED AMILCAR 5) Moderate Fall Risk Interventions  Place yellow fall risk ID band on patient: verified  Provide patient/family education based on risk assessment : completed  Educate patient/family to call staff for assistance when getting out of bed: completed  Place fall precaution signage outside patient door: verified  Utilize bed/chair fall alarm: verifiedTRIAL (TELE 8, NEURO, [employer] AMILCAR 5) High Fall Risk Interventions  Place yellow fall risk ID band on patient: completed  Provide patient/family education based on risk assessment: completed  Educate patient/family to call staff for assistance when getting out of bed: completed  Place fall precaution signage outside patient door: completed  Place patient in room close to nursing station: completed  Utilize bed/chair fall alarm: completed  Notify charge of high risk for huddle: completed    Patient Specific Interventions:      Medication: review medications with patient and family  Mental Status/LOC/Awareness: reorient patient  Toileting: consider obtaining elevated toilet seat or bedside commode (BSC)  Volume/Electrolyte Status: ensure patient remains hydrated  Communication/Sensory: update plan of care on whiteboard  Behavioral: encourage patient to voice feelings  Mobility: utilize bed/chair fall alarm

## 2017-10-28 NOTE — PROGRESS NOTES
Hospital Medicine Interval Note  Date of Service:  10/28/2017    Chief Complaint  86 y.o.-year-old female admitted 10/23/2017 with history of ovarian cancer 18 years ago, she was recently admitted to the hospital for severe dehydration with a syncopal episode/confusion.  Patient woke up and she felt that something was off and became concerned when she was unable to operate the  or dial a phone number.  She was having some trouble with her speech.  She had no weakness in her extremities.  No difficulty ambulating.  She normally ambulates with a walker.  She denied difficulty with her balance.  Speech was not slurred, but she did endorse word finding difficulty.    She was transferred to [facility] as MRI and neurology consultation unavailable at her local facility. MRI was performed and she is found to have tiny acute lacunar right infarcts. She did have a very mild left facial droop at admission. Dr Avila with neurology consulted and cleared her for discharge to home with Stroke bridge clinic follow up and escalate her asa to plavix and add on a statin. Her recent echocardiogram was reviewed and is overall not significant and carotids are also unremarkable.   PT and OT evaluated and determined that she does have some weakness and balance concerns and would benefit from a short rehab stay.    10/25- doing well without complaints. Agrees to rehab today. Rehab physician has evaluated and feels she is a good candidate. Will have to reschedule her PET scan.    10/26- doing well working with nursing and PT/OT, rehab approved- waiting for insurance auth    10/27- was accepted and approved for rehab, pt changed mind and refused rehab- will only accept [facility]- she is accepted there but not clear if there is a bed and transport available. Pt has no new complaints, VSS.    10/28- [facility] not able to take pt today d/t office staff closure- but is open tomorrow. SW still  working on official transport arrangements. Pt with no new symptoms/complaints. Continuing to work with therapies.    Consultants/Specialty  Dr Avila- neuro    Disposition  Discharge orders and summary completed if pt does get accepted and is ready for transfer. cont pt/ot/nursing     Review of Systems   Constitutional: Negative for chills, fever and malaise/fatigue.   HENT: Negative.  Negative for congestion and sore throat.    Eyes: Negative.  Negative for blurred vision and double vision.   Respiratory: Negative.  Negative for cough and shortness of breath.    Cardiovascular: Negative.  Negative for chest pain, palpitations and leg swelling.   Gastrointestinal: Negative.  Negative for constipation, diarrhea, nausea and vomiting.   Genitourinary: Negative.  Negative for dysuria.   Musculoskeletal: Negative.  Negative for joint pain and myalgias.   Skin: Negative.  Negative for itching and rash.   Neurological: Positive for weakness (gen , off balance occasionally). Negative for dizziness, tingling, focal weakness and headaches.   Endo/Heme/Allergies: Negative.  Does not bruise/bleed easily.   Psychiatric/Behavioral: Negative.  Negative for depression.      Physical Exam Laboratory/Imaging   Vitals:    10/27/17 1600 10/27/17 2000 10/28/17 0400 10/28/17 0800   BP: 147/75 119/62 144/75 124/63   Pulse: 69 70 65 67   Resp: 16 16 18 18   Temp: 37 °C (98.6 °F) 37.1 °C (98.8 °F) 36.8 °C (98.2 °F) 37.1 °C (98.7 °F)   SpO2: 92% 92% 93% 91%   Weight:       Height:         Physical Exam   Constitutional: She is oriented to person, place, and time. She appears well-developed and well-nourished. No distress.   Appears comfortable, resting in bed   HENT:   Head: Normocephalic.   Mouth/Throat: Oropharynx is clear and moist. No oropharyngeal exudate.   Eyes: Conjunctivae are normal.   Neck: Normal range of motion. Neck supple. No JVD present.   Cardiovascular: Normal rate, regular rhythm and intact distal pulses.    Murmur  (systolic) heard.  Pulmonary/Chest: Effort normal and breath sounds normal.   Abdominal: Soft. Bowel sounds are normal. She exhibits no distension.   Musculoskeletal: Normal range of motion. She exhibits no edema or tenderness.   Neurological: She is alert and oriented to person, place, and time. No cranial nerve deficit. Coordination normal.   Skin: Skin is warm and dry.   Psychiatric: Her behavior is normal. Judgment and thought content normal.   Appears calm, awaiting snf transport acceptance- unchanged   Nursing note and vitals reviewed.   Lab Results   Component Value Date/Time    WBC 5.4 10/24/2017 04:30 AM    HEMOGLOBIN 11.6 (L) 10/24/2017 04:30 AM    HEMATOCRIT 34.7 (L) 10/24/2017 04:30 AM    PLATELETCT 245 10/24/2017 04:30 AM     Lab Results   Component Value Date/Time    SODIUM 137 10/24/2017 04:31 AM    POTASSIUM 3.9 10/24/2017 04:31 AM    CHLORIDE 102 10/24/2017 04:31 AM    CO2 29 10/24/2017 04:31 AM    GLUCOSE 97 10/24/2017 04:31 AM    BUN 15 10/24/2017 04:31 AM    CREATININE 0.55 10/24/2017 04:31 AM    CREATININE 1.0 04/30/2008 12:30 PM      Assessment/Plan    * CVA (cerebral vascular accident) (CMS-HCC)- (present on admission)   Assessment & Plan    Symptoms resolved, suspect previous syncopal episode earlier this month may have been due to this as well  Time of onset unknown, not TPA candidate  Was on , change anti-platelet to plavix  Atorvastatin  PT/OT/Speech- rec rehab  Refused rehab, awaiting [facility] transportation and bed arrangements- appreciate  assistance        Expressive aphasia- (present on admission)   Assessment & Plan    Resolved see above        Personal history of ovarian cancer- (present on admission)   Assessment & Plan    Followed by Dr. Vee  Has outpatient PET scan scheduled outpt, not able to make it with her being admitted  Will need to reschedule the PET scan        Diarrhea   Assessment & Plan    Has this chronically off/on  Low risk factors for  infectious  Takes lomotil at home- cleared to take again here        Systolic murmur- (present on admission)   Assessment & Plan    Recent echo shows only aortic sclerosis  Ok to cont propanolol             Reviewed items::  Labs reviewed, Radiology images reviewed and Medications reviewed  Michael catheter::  No Michael  DVT prophylaxis - mechanical:  SCDs  Ulcer Prophylaxis::  Not indicated  REHAB- assessed and rcvd rehab services- rec to transfer to rehab facility- pt refused- only accepts going to SNF

## 2017-10-28 NOTE — DISCHARGE PLANNING
Admissions for [facility] is closed on Friday and Saturday per [facility], but they will be open tomorrow and we can potentially arrange for d/c. Will leave report for Sunday SW to please f/u.

## 2017-10-28 NOTE — PROGRESS NOTES
Patient seen Aox4 and resting comfortably in bed;  Plan of care updated; Pending transfer to accepting SNF; Verbalizes understanding;   Patient denying pain, n/v and numbness/tingling at this time;   SAMANIEGO without difficulty and is ambulatory with standby assist;  All needs addressed at this time;  Call light and personal belongings within reach, bed in lowest position, bed alarm on and hourly rounding in place.

## 2017-10-29 PROCEDURE — 700102 HCHG RX REV CODE 250 W/ 637 OVERRIDE(OP): Performed by: NURSE PRACTITIONER

## 2017-10-29 PROCEDURE — 700111 HCHG RX REV CODE 636 W/ 250 OVERRIDE (IP): Performed by: INTERNAL MEDICINE

## 2017-10-29 PROCEDURE — A9270 NON-COVERED ITEM OR SERVICE: HCPCS | Performed by: HOSPITALIST

## 2017-10-29 PROCEDURE — 700102 HCHG RX REV CODE 250 W/ 637 OVERRIDE(OP): Performed by: HOSPITALIST

## 2017-10-29 PROCEDURE — 99232 SBSQ HOSP IP/OBS MODERATE 35: CPT | Performed by: INTERNAL MEDICINE

## 2017-10-29 PROCEDURE — A9270 NON-COVERED ITEM OR SERVICE: HCPCS | Performed by: NURSE PRACTITIONER

## 2017-10-29 PROCEDURE — 770006 HCHG ROOM/CARE - MED/SURG/GYN SEMI*

## 2017-10-29 RX ADMIN — PROPRANOLOL HYDROCHLORIDE 10 MG: 10 TABLET ORAL at 20:03

## 2017-10-29 RX ADMIN — ATORVASTATIN CALCIUM 40 MG: 40 TABLET, FILM COATED ORAL at 20:03

## 2017-10-29 RX ADMIN — CLOPIDOGREL 75 MG: 75 TABLET, FILM COATED ORAL at 08:57

## 2017-10-29 RX ADMIN — ENOXAPARIN SODIUM 40 MG: 100 INJECTION SUBCUTANEOUS at 08:56

## 2017-10-29 RX ADMIN — PAROXETINE HYDROCHLORIDE 20 MG: 20 TABLET, FILM COATED ORAL at 08:57

## 2017-10-29 RX ADMIN — LATANOPROST 1 DROP: 50 SOLUTION OPHTHALMIC at 20:03

## 2017-10-29 RX ADMIN — PROPRANOLOL HYDROCHLORIDE 10 MG: 10 TABLET ORAL at 09:01

## 2017-10-29 ASSESSMENT — ENCOUNTER SYMPTOMS
TINGLING: 0
MUSCULOSKELETAL NEGATIVE: 1
GASTROINTESTINAL NEGATIVE: 1
PSYCHIATRIC NEGATIVE: 1
DOUBLE VISION: 0
NAUSEA: 0
FOCAL WEAKNESS: 0
DIARRHEA: 0
DEPRESSION: 0
CHILLS: 0
FEVER: 0
EYES NEGATIVE: 1
SHORTNESS OF BREATH: 0
VOMITING: 0
COUGH: 0
RESPIRATORY NEGATIVE: 1
DIZZINESS: 0
BLURRED VISION: 0
HEADACHES: 0
BRUISES/BLEEDS EASILY: 0
MYALGIAS: 0
PALPITATIONS: 0
SORE THROAT: 0
CARDIOVASCULAR NEGATIVE: 1
WEAKNESS: 1
CONSTIPATION: 0

## 2017-10-29 ASSESSMENT — PAIN SCALES - GENERAL: PAINLEVEL_OUTOF10: 0

## 2017-10-29 NOTE — DISCHARGE PLANNING
Medical SW    Referral: Medical staff questioning about d/c to [facility]. Previous notes indicates may be available on Sunday,    Intervention: Sw called [phone] and left VM w/ . Sw requesting if pt can d/c Sunday.      Plan: Sw to assist w/ d/c planning as needed.

## 2017-10-29 NOTE — CARE PLAN
Problem: Safety  Goal: Will remain free from injury  Outcome: PROGRESSING AS EXPECTED  Pt. On fall precautions, calls appropriately for assistance.    Problem: Discharge Barriers/Planning  Goal: Patient's continuum of care needs will be met  Outcome: PROGRESSING AS EXPECTED  Pt. Waiting on SNF

## 2017-10-29 NOTE — DISCHARGE SUMMARY
CHIEF COMPLAINT ON ADMISSION  No chief complaint on file.      CODE STATUS  DNR    HPI & HOSPITAL COURSE  This is a 86 y.o. female here with 86 y.o.-year-old female admitted 10/23/2017 with history of ovarian cancer 18 years ago, she was recently admitted to the hospital for severe dehydration with a syncopal episode/confusion.  Patient woke up and she felt that something was off and became concerned when she was unable to operate the  or dial a phone number.  She was having some trouble with her speech.  She had no weakness in her extremities.  No difficulty ambulating.  She normally ambulates with a walker.  She denied difficulty with her balance.  Speech was not slurred, but she did endorse word finding difficulty.    She was transferred to [facility] as MRI and neurology consultation unavailable at her local facility. MRI was performed and she is found to have tiny acute lacunar right infarcts. She did have a very mild left facial droop at admission. Dr Avila with neurology consulted and cleared her for discharge to home with Stroke bridge clinic follow up and escalate her asa to plavix and add on a statin. Her recent echocardiogram was reviewed and is overall not significant and carotids are also unremarkable.   PT and OT evaluated and determined that she does have some weakness and balance concerns and would benefit from a short rehab stay. She decided against rehab even though she was accepted and insurance approved. She is agreeable to [facility] only today. She will discharge to their facility if transport and a bed is available. Will have to reschedule her PET scan that she was due to get 10/25.    Therefore, she is discharged in good and stable condition with close outpatient follow-up.    SPECIFIC OUTPATIENT FOLLOW-UP  [facility] within 1-2 weeks.  Your oncologist within 1-2 weeks.    DISCHARGE PROBLEM LIST  Principal Problem:    CVA (cerebral  vascular accident) (CMS-HCC) POA: Yes  Active Problems:    Expressive aphasia POA: Yes    Personal history of ovarian cancer POA: Yes    Systolic murmur POA: Yes    DNR (do not resuscitate) POA: Yes    Diarrhea (Chronic) POA: No  Resolved Problems:    * No resolved hospital problems. *      FOLLOW UP  Future Appointments  Date Time Provider Department Center   11/3/2017 10:30 AM [address] 1 [id number] [address] M.D.  [address]  [facility] [id number]  [phone]    Schedule an appointment as soon as possible for a visit      [facility]    Schedule an appointment as soon as possible for a visit        MEDICATIONS ON DISCHARGE   Ronen Eldridgelanda   Home Medication Instructions RATNA:[id number]    Printed on:10/26/17 0746   Medication Information                      alendronate (FOSAMAX) 70 MG Tab  TAKE 1 TABLET EVERY 7 DAYS             atorvastatin (LIPITOR) 40 MG Tab  Take 1 Tab by mouth every evening.             clopidogrel (PLAVIX) 75 MG Tab  Take 1 Tab by mouth every day.             diphenoxylate-atropine (LOMOTIL) 2.5-0.025 MG Tab  TAKE  (1)  TABLET  FOUR TIMES DAILY AS NEEDED FOR DIARRHEA.             hydrocodone-acetaminophen (NORCO) 5-325 MG Tab per tablet  Take 1-2 Tabs by mouth every four hours as needed.             latanoprost (XALATAN) 0.005 % SOLN  Place 1 Drop in right eye every evening.             magnesium oxide (MAG-OX) 400 (241.3 Mg) MG Tab tablet  Take 1 Tab by mouth every day.             metaxalone 400 MG Tab  Take 400-800 mg by mouth 3 times a day as needed for Muscle Spasms.             oxybutynin (DITROPAN) 5 MG Tab  Take 5 mg by mouth 4 times a day as needed.             paroxetine (PAXIL) 20 MG Tab  TAKE 1 TABLET DAILY WITH   FOOD             potassium chloride SA (KDUR) 20 MEQ Tab CR  Take 1 Tab by mouth every day.             propranolol (INDERAL) 10 MG Tab  TAKE (1) TABLET BY MOUTH TWICE DAILY.             triamterene/hctz (MAXZIDE-25/DYAZIDE)  37.5-25 MG Cap  TAKE 1 CAPSULE EVERY       MORNING             vitamin D (CHOLECALCIFEROL) 1000 UNIT TABS  Take 1 Tab by mouth every day.                 DIET  Orders Placed This Encounter   Procedures   • DIET ORDER     Standing Status:   Standing     Number of Occurrences:   1     Order Specific Question:   Diet:     Answer:   Regular [1]       ACTIVITY  As tolerated and directed by rehab.  Weight bearing as tolerated      CONSULTATIONS  Dr Avila-Neurology    PROCEDURES  None    LABORATORY  Lab Results   Component Value Date/Time    SODIUM 137 10/24/2017 04:31 AM    POTASSIUM 3.9 10/24/2017 04:31 AM    CHLORIDE 102 10/24/2017 04:31 AM    CO2 29 10/24/2017 04:31 AM    GLUCOSE 97 10/24/2017 04:31 AM    BUN 15 10/24/2017 04:31 AM    CREATININE 0.55 10/24/2017 04:31 AM    CREATININE 1.0 04/30/2008 12:30 PM        Lab Results   Component Value Date/Time    WBC 5.4 10/24/2017 04:30 AM    HEMOGLOBIN 11.6 (L) 10/24/2017 04:30 AM    HEMATOCRIT 34.7 (L) 10/24/2017 04:30 AM    PLATELETCT 245 10/24/2017 04:30 AM        Total time of the discharge process exceeds 36 minutes

## 2017-10-29 NOTE — CARE PLAN
Problem: Safety  Goal: Will remain free from injury  Outcome: PROGRESSING AS EXPECTED      Problem: Discharge Barriers/Planning  Goal: Patient's continuum of care needs will be met  Outcome: PROGRESSING AS EXPECTED    Intervention: Involve patient and significant other/support system in setting and prioritizing goals for hospital stay and discharge  Plan for SNF

## 2017-10-29 NOTE — PROGRESS NOTES
Ivone Silva Fall Risk Assessment:     Last Known Fall: Within the last month  Mobility: Dizziness/generalized weakness  Medications: Cardiovascular or central nervous system meds  Mental Status/LOC/Awareness: Awake, alert, and oriented to date, place, and person  Toileting Needs: No needs  Volume/Electrolyte Status: No problems  Communication/Sensory: Visual (Glasses)/hearing deficit  Behavior: Appropriate behavior  Ivone Silva Fall Risk Total: 9  Fall Risk Level: LOW RISK    Universal Fall Precautions:  call light/belongings in reach, bed in low position and locked, wheelchairs and assistive devices out of sight, use non-slip footwear, siderails up x 2, adequate lighting, clutter free and spill free environment, educate on level of risk, educate to call for assistance    Fall Risk Level Interventions:   TRIAL ([place], NEURO, MED AMILCAR 5) Low Fall Risk Interventions  Place yellow fall risk ID band on patient: verified  Provide patient/family education based on risk assessment: completed  Educate patient/family to call staff for assistance when getting out of bed: completed  Place fall precaution signage outside patient door: verifiedTRIAL ([place], NEURO, MED AMILCAR 5) Moderate Fall Risk Interventions  Place yellow fall risk ID band on patient: verified  Provide patient/family education based on risk assessment : completed  Educate patient/family to call staff for assistance when getting out of bed: completed  Place fall precaution signage outside patient door: verified  Utilize bed/chair fall alarm: verifiedTRIAL ([place], NEURO, MED AMILCAR 5) High Fall Risk Interventions  Place yellow fall risk ID band on patient: completed  Provide patient/family education based on risk assessment: completed  Educate patient/family to call staff for assistance when getting out of bed: completed  Place fall precaution signage outside patient door: completed  Place patient in room close to nursing station: completed  Utilize  bed/chair fall alarm: completed  Notify charge of high risk for huddle: completed    Patient Specific Interventions:     Medication: review medications with patient and family  Mental Status/LOC/Awareness: check on patient hourly, utilize bed/chair fall alarm and reinforce the use of call light  Toileting: provide frquent toileting, instruct patient/family on the need to call for assistance when toileting and do not leave patient unattended in bathroom/refer to toileting scripting  Volume/Electrolyte Status: ensure patient remains hydrated and monitor abnormal lab values  Communication/Sensory: update plan of care on whiteboard, ensure proper positioning when transferrng/ambulating and ensure patient has glasses/contacts and hearing aids/dentures  Behavioral: engage patient in daily activities, administer medication as ordered and instruct/reinforce fall program rationale  Mobility: schedule physical activity throughout the day, provide comfort measures during transport, dangle prior to standing, utilize bed/chair fall alarm, ensure bed is locked and in lowest position and provide appropriate assistive device

## 2017-10-29 NOTE — PROGRESS NOTES
Hospital Medicine Interval Note  Date of Service:  10/29/2017    Chief Complaint  86 y.o.-year-old female admitted 10/23/2017 with history of ovarian cancer 18 years ago, she was recently admitted to the hospital for severe dehydration with a syncopal episode/confusion.  Patient woke up and she felt that something was off and became concerned when she was unable to operate the  or dial a phone number.  She was having some trouble with her speech.  She had no weakness in her extremities.  No difficulty ambulating.  She normally ambulates with a walker.  She denied difficulty with her balance.  Speech was not slurred, but she did endorse word finding difficulty.    She was transferred to [facility] as MRI and neurology consultation unavailable at her local facility. MRI was performed and she is found to have tiny acute lacunar right infarcts. She did have a very mild left facial droop at admission. Dr Avila with neurology consulted and cleared her for discharge to home with Stroke bridge clinic follow up and escalate her asa to plavix and add on a statin. Her recent echocardiogram was reviewed and is overall not significant and carotids are also unremarkable.   PT and OT evaluated and determined that she does have some weakness and balance concerns and would benefit from a short rehab stay.    10/25- doing well without complaints. Agrees to rehab today. Rehab physician has evaluated and feels she is a good candidate. Will have to reschedule her PET scan.    10/26- doing well working with nursing and PT/OT, rehab approved- waiting for insurance auth    10/27- was accepted and approved for rehab, pt changed mind and refused rehab- will only accept [facility]- she is accepted there but not clear if there is a bed and transport available. Pt has no new complaints, VSS.    10/28- [facility] not able to take pt today d/t office staff closure- but is open tomorrow. SW still  working on official transport arrangements. Pt with no new symptoms/complaints. Continuing to work with therapies.    10/29- no new changes or complaints, SNF pending, VSS. Removed bowel protocol as she has chronic soft to loose stools- no need to keep offering.    Consultants/Specialty  Dr Avila- neuro    Disposition  Discharge orders and summary completed if pt does get accepted and is ready for transfer. cont pt/ot/nursing     Review of Systems   Constitutional: Negative for chills, fever and malaise/fatigue.   HENT: Negative.  Negative for congestion and sore throat.    Eyes: Negative.  Negative for blurred vision and double vision.   Respiratory: Negative.  Negative for cough and shortness of breath.    Cardiovascular: Negative.  Negative for chest pain, palpitations and leg swelling.   Gastrointestinal: Negative.  Negative for constipation, diarrhea, nausea and vomiting.   Genitourinary: Negative.  Negative for dysuria.   Musculoskeletal: Negative.  Negative for joint pain and myalgias.   Skin: Negative.  Negative for itching and rash.   Neurological: Positive for weakness (gen , off balance occasionally). Negative for dizziness, tingling, focal weakness and headaches.   Endo/Heme/Allergies: Negative.  Does not bruise/bleed easily.   Psychiatric/Behavioral: Negative.  Negative for depression.        Feeling ready to go to SNF- hopeful for discharge      Physical Exam Laboratory/Imaging   Vitals:    10/28/17 0400 10/28/17 0800 10/28/17 2043 10/29/17 0400   BP: 144/75 124/63 117/62 120/61   Pulse: 65 67 67 71   Resp: 18 18 17 15   Temp: 36.8 °C (98.2 °F) 37.1 °C (98.7 °F) 36.8 °C (98.2 °F) 36.3 °C (97.4 °F)   SpO2: 93% 91% 95% 93%   Weight:       Height:         Physical Exam   Constitutional: She is oriented to person, place, and time. She appears well-developed and well-nourished. No distress.   Appears comfortable, resting in bed   HENT:   Head: Normocephalic.   Eyes: Conjunctivae are normal.   Neck: Normal  range of motion. Neck supple.   Cardiovascular: Normal rate, regular rhythm and intact distal pulses.    Murmur (systolic) heard.  Pulmonary/Chest: Effort normal and breath sounds normal.   Abdominal: Soft. Bowel sounds are normal. She exhibits no distension.   Musculoskeletal: Normal range of motion. She exhibits no edema or tenderness.   Neurological: She is alert and oriented to person, place, and time. No cranial nerve deficit.   Skin: Skin is warm and dry. She is not diaphoretic.   Psychiatric: She has a normal mood and affect. Her behavior is normal. Judgment and thought content normal.   Appears calm, awaiting snf transport acceptance- unchanged   Nursing note and vitals reviewed.   Lab Results   Component Value Date/Time    WBC 5.4 10/24/2017 04:30 AM    HEMOGLOBIN 11.6 (L) 10/24/2017 04:30 AM    HEMATOCRIT 34.7 (L) 10/24/2017 04:30 AM    PLATELETCT 245 10/24/2017 04:30 AM     Lab Results   Component Value Date/Time    SODIUM 137 10/24/2017 04:31 AM    POTASSIUM 3.9 10/24/2017 04:31 AM    CHLORIDE 102 10/24/2017 04:31 AM    CO2 29 10/24/2017 04:31 AM    GLUCOSE 97 10/24/2017 04:31 AM    BUN 15 10/24/2017 04:31 AM    CREATININE 0.55 10/24/2017 04:31 AM    CREATININE 1.0 04/30/2008 12:30 PM      Assessment/Plan    * CVA (cerebral vascular accident) (CMS-[facility])- (present on admission)   Assessment & Plan    Symptoms resolved, suspect previous syncopal episode earlier this month may have been due to this as well  Time of onset unknown, not TPA candidate  Was on , change anti-platelet to plavix  Atorvastatin  PT/OT/Speech- rec rehab  Refused rehab, awaiting [facility] transportation and bed arrangements- appreciate  assistance        Expressive aphasia- (present on admission)   Assessment & Plan    Resolved see above        Personal history of ovarian cancer- (present on admission)   Assessment & Plan    Followed by Dr. Vee  Has outpatient PET scan scheduled outpt, not able to make it with her  being admitted  Will need to reschedule the PET scan        Diarrhea   Assessment & Plan    Has this chronically off/on  Low risk factors for infectious  Takes lomotil at home- cleared to take again here        Systolic murmur- (present on admission)   Assessment & Plan    Recent echo shows only aortic sclerosis  Ok to cont propanolol             Reviewed items::  Labs reviewed, Radiology images reviewed and Medications reviewed  Michael catheter::  No Michael  DVT prophylaxis - mechanical:  SCDs  Ulcer Prophylaxis::  Not indicated  REHAB- assessed and rcvd rehab services- rec to transfer to rehab facility- pt refused- only accepts going to SNF

## 2017-10-29 NOTE — PROGRESS NOTES
Ivone Silva Fall Risk Assessment:     Last Known Fall: Within the last month  Mobility: Dizziness/generalized weakness  Medications: Cardiovascular or central nervous system meds  Mental Status/LOC/Awareness: Awake, alert, and oriented to date, place, and person  Toileting Needs: No needs  Volume/Electrolyte Status: No problems  Communication/Sensory: Visual (Glasses)/hearing deficit  Behavior: Appropriate behavior  Ivone Silva Fall Risk Total: 9  Fall Risk Level: LOW RISK    Universal Fall Precautions:  call light/belongings in reach, bed in low position and locked, wheelchairs and assistive devices out of sight, siderails up x 2    Fall Risk Level Interventions:   TRIAL (TELE 8, NEURO, MED AMILCAR 5) Low Fall Risk Interventions  Place yellow fall risk ID band on patient: verified  Provide patient/family education based on risk assessment: completed  Educate patient/family to call staff for assistance when getting out of bed: completed  Place fall precaution signage outside patient door: verifiedTRIAL (TELE 8, NEURO, MED AMILCAR 5) Moderate Fall Risk Interventions  Place yellow fall risk ID band on patient: verified  Provide patient/family education based on risk assessment : completed  Educate patient/family to call staff for assistance when getting out of bed: completed  Place fall precaution signage outside patient door: verified  Utilize bed/chair fall alarm: verifiedTRIAL (TELE 8, NEURO, [employer] AMILCAR 5) High Fall Risk Interventions  Place yellow fall risk ID band on patient: completed  Provide patient/family education based on risk assessment: completed  Educate patient/family to call staff for assistance when getting out of bed: completed  Place fall precaution signage outside patient door: completed  Place patient in room close to nursing station: completed  Utilize bed/chair fall alarm: completed  Notify charge of high risk for huddle: completed    Patient Specific Interventions:     Medication: review  medications with patient and family  Mental Status/LOC/Awareness: reinforce falls education, check on patient hourly and utilize bed/chair fall alarm  Toileting: provide frquent toileting  Volume/Electrolyte Status: ensure patient remains hydrated  Communication/Sensory: update plan of care on whiteboard  Behavioral: administer medication as ordered  Mobility: utilize bed/chair fall alarm and ensure bed is locked and in lowest position

## 2017-10-30 VITALS
DIASTOLIC BLOOD PRESSURE: 65 MMHG | OXYGEN SATURATION: 93 % | BODY MASS INDEX: 19.06 KG/M2 | TEMPERATURE: 97.9 F | HEIGHT: 63 IN | HEART RATE: 71 BPM | WEIGHT: 107.58 LBS | SYSTOLIC BLOOD PRESSURE: 142 MMHG | RESPIRATION RATE: 15 BRPM

## 2017-10-30 PROBLEM — R47.01 EXPRESSIVE APHASIA: Status: RESOLVED | Noted: 2017-10-23 | Resolved: 2017-10-30

## 2017-10-30 PROCEDURE — 700102 HCHG RX REV CODE 250 W/ 637 OVERRIDE(OP): Performed by: NURSE PRACTITIONER

## 2017-10-30 PROCEDURE — A9270 NON-COVERED ITEM OR SERVICE: HCPCS | Performed by: NURSE PRACTITIONER

## 2017-10-30 PROCEDURE — A9270 NON-COVERED ITEM OR SERVICE: HCPCS | Performed by: HOSPITALIST

## 2017-10-30 PROCEDURE — 700102 HCHG RX REV CODE 250 W/ 637 OVERRIDE(OP): Performed by: HOSPITALIST

## 2017-10-30 PROCEDURE — 99239 HOSP IP/OBS DSCHRG MGMT >30: CPT | Performed by: INTERNAL MEDICINE

## 2017-10-30 RX ADMIN — CLOPIDOGREL 75 MG: 75 TABLET, FILM COATED ORAL at 08:37

## 2017-10-30 RX ADMIN — PAROXETINE HYDROCHLORIDE 20 MG: 20 TABLET, FILM COATED ORAL at 08:37

## 2017-10-30 RX ADMIN — PROPRANOLOL HYDROCHLORIDE 10 MG: 10 TABLET ORAL at 08:37

## 2017-10-30 NOTE — DISCHARGE PLANNING
Attempted to contact [facility] regarding bed availability, however, no answer. Voicemail left for Rj.

## 2017-10-30 NOTE — PROGRESS NOTES
Patient discharged via uber, taken to entrance via wheelchair with CNA.   Report called to [facility]  Patient did not have IV  All belongings in bag with patient, she is wearing her own clothes.  COBRA and packet with patient.   Complete discharge instructions reviewed with patient, all questions answered.   Brother Sea Melo notified of patient's transfer.

## 2017-10-30 NOTE — PROGRESS NOTES
Ivone Silva Fall Risk Assessment:     Last Known Fall: Within the last month  Mobility: Dizziness/generalized weakness  Medications: Cardiovascular or central nervous system meds  Mental Status/LOC/Awareness: Awake, alert, and oriented to date, place, and person  Toileting Needs: No needs  Volume/Electrolyte Status: No problems  Communication/Sensory: Visual (Glasses)/hearing deficit  Behavior: Appropriate behavior  Ivone Silva Fall Risk Total: 9  Fall Risk Level: LOW RISK    Universal Fall Precautions:  call light/belongings in reach, bed in low position and locked, wheelchairs and assistive devices out of sight, siderails up x 2, use non-slip footwear, adequate lighting, educate on level of risk, clutter free and spill free environment, educate to call for assistance    Fall Risk Level Interventions:   TRIAL ([employer], NEURO, MED AMILCAR 5) Low Fall Risk Interventions  Place yellow fall risk ID band on patient: verified  Provide patient/family education based on risk assessment: completed  Educate patient/family to call staff for assistance when getting out of bed: completed  Place fall precaution signage outside patient door: verifiedTRIAL ([employer], NEURO, MED AMILCAR 5) Moderate Fall Risk Interventions  Place yellow fall risk ID band on patient: verified  Provide patient/family education based on risk assessment : completed  Educate patient/family to call staff for assistance when getting out of bed: completed  Place fall precaution signage outside patient door: verified  Utilize bed/chair fall alarm: verifiedTRIAL ([employer], NEURO, MED AMILCAR 5) High Fall Risk Interventions  Place yellow fall risk ID band on patient: completed  Provide patient/family education based on risk assessment: completed  Educate patient/family to call staff for assistance when getting out of bed: completed  Place fall precaution signage outside patient door: completed  Place patient in room close to nursing station: completed  Utilize  bed/chair fall alarm: completed  Notify charge of high risk for huddle: completed    Patient Specific Interventions:     Medication: review medications with patient and family  Mental Status/LOC/Awareness: check on patient hourly and reinforce the use of call light  Toileting: provide frquent toileting and instruct patient/family on the use of grab bars  Volume/Electrolyte Status: ensure patient remains hydrated and monitor abnormal lab values  Communication/Sensory: update plan of care on whiteboard and ensure patient has glasses/contacts and hearing aids/dentures  Behavioral: encourage patient to voice feelings, engage patient in daily activities and administer medication as ordered  Mobility: provide comfort measures during transport, utilize bed/chair fall alarm, ensure bed is locked and in lowest position and provide appropriate assistive device

## 2017-10-30 NOTE — CARE PLAN
Problem: Safety  Goal: Will remain free from injury  Outcome: PROGRESSING AS EXPECTED    Intervention: Provide assistance with mobility  Patient requires standby assist only, steady gait, calls appropriately      Problem: Discharge Barriers/Planning  Goal: Patient's continuum of care needs will be met  Outcome: PROGRESSING AS EXPECTED    Intervention: Assess potential discharge barriers on admission and throughout hospital stay  Discussed awaiting call back from facility for acceptance, will follow up this AM

## 2017-10-30 NOTE — DISCHARGE INSTRUCTIONS
Discharge Instructions    Discharged to other by car/uber with self. Discharged via walking, hospital escort: Yes.  Special equipment needed: Not Applicable    Be sure to schedule a follow-up appointment with your primary care doctor or any specialists as instructed.     Discharge Plan:   Diet Plan: Discussed  Activity Level: Discussed  Confirmed Follow up Appointment: Appointment Scheduled  Confirmed Symptoms Management: Discussed  Medication Reconciliation Updated: Yes  Influenza Vaccine Indication: Not indicated: Previously immunized this influenza season and > 8 years of age    I understand that a diet low in cholesterol, fat, and sodium is recommended for good health. Unless I have been given specific instructions below for another diet, I accept this instruction as my diet prescription.   Other diet: Regular    Special Instructions: None    · Is patient discharged on Warfarin / Coumadin?   No     · Is patient Post Blood Transfusion?  No    Depression / Suicide Risk    As you are discharged from this [facility] facility, it is important to learn how to keep safe from harming yourself.    Recognize the warning signs:  · Abrupt changes in personality, positive or negative- including increase in energy   · Giving away possessions  · Change in eating patterns- significant weight changes-  positive or negative  · Change in sleeping patterns- unable to sleep or sleeping all the time   · Unwillingness or inability to communicate  · Depression  · Unusual sadness, discouragement and loneliness  · Talk of wanting to die  · Neglect of personal appearance   · Rebelliousness- reckless behavior  · Withdrawal from people/activities they love  · Confusion- inability to concentrate     If you or a loved one observes any of these behaviors or has concerns about self-harm, here's what you can do:  · Talk about it- your feelings and reasons for harming yourself  · Remove any means that you might use to hurt yourself (examples:  pills, rope, extension cords, firearm)  · Get professional help from the community (Mental Health, Substance Abuse, psychological counseling)  · Do not be alone:Call your Safe Contact- someone whom you trust who will be there for you.  · Call your local CRISIS HOTLINE 819-8077 or 337-232-9656  · Call your local Children's Mobile Crisis Response Team [place] (247) 222-3353 or www.Streetcar  · Call the toll free National Suicide Prevention Hotlines   · National Suicide Prevention Lifeline 073-729-TYPS (8793)  · National Hope Line Network 800-SUICIDE (798-7180)    Stroke Prevention  Some medical conditions and behaviors are associated with an increased chance of having a stroke. You may prevent a stroke by making healthy choices and managing medical conditions.  HOW CAN I REDUCE MY RISK OF HAVING A STROKE?   · Stay physically active. Get at least 30 minutes of activity on most or all days.  · Do not smoke. It may also be helpful to avoid exposure to secondhand smoke.  · Limit alcohol use. Moderate alcohol use is considered to be:  ¨ No more than 2 drinks per day for men.  ¨ No more than 1 drink per day for nonpregnant women.  · Eat healthy foods. This involves:  ¨ Eating 5 or more servings of fruits and vegetables a day.  ¨ Making dietary changes that address high blood pressure (hypertension), high cholesterol, diabetes, or obesity.  · Manage your cholesterol levels.  ¨ Making food choices that are high in fiber and low in saturated fat, trans fat, and cholesterol may control cholesterol levels.  ¨ Take any prescribed medicines to control cholesterol as directed by your health care provider.  · Manage your diabetes.  ¨ Controlling your carbohydrate and sugar intake is recommended to manage diabetes.  ¨ Take any prescribed medicines to control diabetes as directed by your health care provider.  · Control your hypertension.  ¨ Making food choices that are low in salt (sodium), saturated fat, trans fat, and  cholesterol is recommended to manage hypertension.  ¨ Ask your health care provider if you need treatment to lower your blood pressure. Take any prescribed medicines to control hypertension as directed by your health care provider.  ¨ If you are 18-39 years of age, have your blood pressure checked every 3-5 years. If you are 40 years of age or older, have your blood pressure checked every year.  · Maintain a healthy weight.  ¨ Reducing calorie intake and making food choices that are low in sodium, saturated fat, trans fat, and cholesterol are recommended to manage weight.  · Stop drug abuse.  · Avoid taking birth control pills.  ¨ Talk to your health care provider about the risks of taking birth control pills if you are over 35 years old, smoke, get migraines, or have ever had a blood clot.  · Get evaluated for sleep disorders (sleep apnea).  ¨ Talk to your health care provider about getting a sleep evaluation if you snore a lot or have excessive sleepiness.  · Take medicines only as directed by your health care provider.  ¨ For some people, aspirin or blood thinners (anticoagulants) are helpful in reducing the risk of forming abnormal blood clots that can lead to stroke. If you have the irregular heart rhythm of atrial fibrillation, you should be on a blood thinner unless there is a good reason you cannot take them.  ¨ Understand all your medicine instructions.  · Make sure that other conditions (such as anemia or atherosclerosis) are addressed.  SEEK IMMEDIATE MEDICAL CARE IF:   · You have sudden weakness or numbness of the face, arm, or leg, especially on one side of the body.  · Your face or eyelid droops to one side.  · You have sudden confusion.  · You have trouble speaking (aphasia) or understanding.  · You have sudden trouble seeing in one or both eyes.  · You have sudden trouble walking.  · You have dizziness.  · You have a loss of balance or coordination.  · You have a sudden, severe headache with no known  cause.  · You have new chest pain or an irregular heartbeat.  Any of these symptoms may represent a serious problem that is an emergency. Do not wait to see if the symptoms will go away. Get medical help at once. Call your local emergency services (911 in [place] Do not drive yourself to the hospital.     This information is not intended to replace advice given to you by your health care provider. Make sure you discuss any questions you have with your health care provider.     Document Released: 01/25/2006 Document Revised: 01/08/2016 Document Reviewed: 06/20/2014  [employer] Interactive Patient Education ©2016 [employer]    Rehabilitation After a Stroke, Adult  A stroke can cause many types of problems. The treatment of stroke involves three stages:  · Prevention.  · Treatment immediately following a stroke.  · Rehabilitation after a stroke.  HOW IS MY REHABILITATION PLAN DEVELOPED?  A detailed exam by your health care provider helps outline what problems were caused by the stroke. Your health care provider may consult specialists. The specialists may include doctors, occupational and physical therapists, and speech therapists. It is then possible to make a plan that best fits your needs.   Your evaluation might include the following:  · Evaluation of your ability to do daily activities that require using muscles, coordination, vision, reasoning, memory, and problem solving. Interviews with you and your health care provider will help determine what you could do and could not do before the stroke.  · Evaluation of your ability to do personal self-care tasks, such as dressing, grooming, and eating.  · Tests to see if there are sensory and motor changes due to the stroke, especially in the hands and legs.  WHAT ARE THE TYPES OF REHABILITATION?  Your health care provider may have you start rehabilitation right away depending on the type and severity of your stroke. Rehabilitation after a stroke is focused on getting  function back and preventing another stroke. Rehabilitation might include:   · Physical therapy. This can include help with walking, sitting, lying down, and balance. It may also be designed to help prevent shortening of the muscles (contractures) and swelling (edema).  · Occupational therapy. This therapy helps you to relearn skills needed for leading a normal life. These could include eating, using the restroom, dressing, and taking care of yourself. It helps to make you more independent.  · Vision therapy. This can help you to retrain, strengthen, and improve your vision after a stroke.  · Speech therapy. This can help to improve your speech and communication skills. It also teaches you and your family members to cope with problems of being unable to communicate.  · Cognitive therapy. This therapy can help with problems caused by lack of memory, attention, or concentration.  · Psychological or psychiatric therapy. This can help you cope with problems of frustration and emotional problems that may develop after a stroke.       This information is not intended to replace advice given to you by your health care provider. Make sure you discuss any questions you have with your health care provider.     Document Released: 01/06/2009 Document Revised: 05/03/2016 Document Reviewed: 05/22/2014  [employer] Interactive Patient Education ©2016 [employer]

## 2017-10-30 NOTE — DISCHARGE PLANNING
Received call back from Rj at [facility]. They are able to accept patient today. Carrie) notified via voicemail.

## 2017-10-30 NOTE — DISCHARGE PLANNING
Per Adia(TA), patient will be transport to [facility] via Uber. Transport with [facility] van canceled. Rj([facility]) notified of transport time via phone.

## 2017-10-30 NOTE — DISCHARGE PLANNING
Medical Social Work     was notified that pt is accepted to [facility] and they are requesting a doctor to doctor [phone] on the patient before transfer.

## 2017-10-30 NOTE — DISCHARGE SUMMARY
CHIEF COMPLAINT ON ADMISSION  Difficulty speaking    CODE STATUS  DNR    HPI & HOSPITAL COURSE  This is a 86 y.o. female here with history of ovarian cancer 18 years ago, she was recently admitted to the hospital for severe dehydration with a syncopal episode/confusion.  Patient woke up and she felt that something was off and became concerned when she was unable to operate the  or dial a phone number.  She was having some trouble with her speech.  She had no weakness in her extremities.  No difficulty ambulating.  She normally ambulates with a walker.  She denied difficulty with her balance.  Speech was not slurred, but she did endorse word finding difficulty.    She was transferred to [facility] as MRI and neurology consultation unavailable at her local facility. MRI was performed and she is found to have tiny acute lacunar right infarcts. She did have a very mild left facial droop at admission. Dr Avila with neurology consulted and cleared her for discharge to home with Stroke bridge clinic follow up and escalate her asa to plavix and add on a statin. Her recent echocardiogram was reviewed and is overall not significant and carotids are also unremarkable.   PT and OT evaluated and determined that she does have some weakness and balance concerns and would benefit from a short rehab stay. She decided against rehab even though she was accepted and insurance approved. She is agreeable to [facility]. She will have to reschedule her PET scan that she was due to get 10/25.     Therefore, she is discharged in good and stable condition with close outpatient follow-up.    SPECIFIC OUTPATIENT FOLLOW-UP  Primary care provider upon discharge from [facility]    Neurology stroke bridge clinic    DISCHARGE PROBLEM LIST  Principal Problem:    CVA (cerebral vascular accident) (CMS-[facility]) POA: Yes  Active Problems:    Personal history of ovarian cancer POA:  Yes    Systolic murmur POA: Yes    DNR (do not resuscitate) POA: Yes    Diarrhea (Chronic) POA: No  Resolved Problems:    Expressive aphasia POA: Yes      FOLLOW UP  Future Appointments  Date Time Provider Department Center   11/3/2017 10:30 AM 75 YONNY RAMSEY 1 [id number] 75 YONNY Thomas M.D.  [address] [facility]  [address]  [facility] [address]  [phone]    Schedule an appointment as soon as possible for a visit      STROKE BRIDGE CLINIC    Schedule an appointment as soon as possible for a visit        MEDICATIONS ON DISCHARGE   Marifer Eldridge   Home Medication Instructions RATNA:[id number]    Printed on:10/30/17 1322   Medication Information                      alendronate (FOSAMAX) 70 MG Tab  TAKE 1 TABLET EVERY 7 DAYS             atorvastatin (LIPITOR) 40 MG Tab  Take 1 Tab by mouth every evening.             clopidogrel (PLAVIX) 75 MG Tab  Take 1 Tab by mouth every day.             diphenoxylate-atropine (LOMOTIL) 2.5-0.025 MG Tab  TAKE  (1)  TABLET  FOUR TIMES DAILY AS NEEDED FOR DIARRHEA.             hydrocodone-acetaminophen (NORCO) 5-325 MG Tab per tablet  Take 1-2 Tabs by mouth every four hours as needed.             latanoprost (XALATAN) 0.005 % SOLN  Place 1 Drop in right eye every evening.             magnesium oxide (MAG-OX) 400 (241.3 Mg) MG Tab tablet  Take 1 Tab by mouth every day.             metaxalone 400 MG Tab  Take 400-800 mg by mouth 3 times a day as needed for Muscle Spasms.             oxybutynin (DITROPAN) 5 MG Tab  Take 5 mg by mouth 4 times a day as needed.             paroxetine (PAXIL) 20 MG Tab  TAKE 1 TABLET DAILY WITH   FOOD             potassium chloride SA (KDUR) 20 MEQ Tab CR  Take 1 Tab by mouth every day.             propranolol (INDERAL) 10 MG Tab  TAKE (1) TABLET BY MOUTH TWICE DAILY.             triamterene/hctz (MAXZIDE-25/DYAZIDE) 37.5-25 MG Cap  TAKE 1 CAPSULE EVERY       MORNING             vitamin D (CHOLECALCIFEROL) 1000 UNIT TABS  Take 1 Tab by mouth every day.                  DIET  Orders Placed This Encounter   Procedures   • DIET ORDER     Standing Status:   Standing     Number of Occurrences:   1     Order Specific Question:   Diet:     Answer:   Regular [1]       ACTIVITY  As tolerated and directed by skilled nursing.  Weight bearing as tolerated      CONSULTATIONS  neurology    PROCEDURES  none    LABORATORY  Lab Results   Component Value Date/Time    SODIUM 137 10/24/2017 04:31 AM    POTASSIUM 3.9 10/24/2017 04:31 AM    CHLORIDE 102 10/24/2017 04:31 AM    CO2 29 10/24/2017 04:31 AM    GLUCOSE 97 10/24/2017 04:31 AM    BUN 15 10/24/2017 04:31 AM    CREATININE 0.55 10/24/2017 04:31 AM    CREATININE 1.0 04/30/2008 12:30 PM        Lab Results   Component Value Date/Time    WBC 5.4 10/24/2017 04:30 AM    HEMOGLOBIN 11.6 (L) 10/24/2017 04:30 AM    HEMATOCRIT 34.7 (L) 10/24/2017 04:30 AM    PLATELETCT 245 10/24/2017 04:30 AM        Total time of the discharge process exceeds 45 minutes

## 2017-10-30 NOTE — CARE PLAN
Problem: Safety  Goal: Will remain free from injury  Outcome: PROGRESSING AS EXPECTED  Pt instructed to call before getting OOB. Pt verbalized understanding and calls appropriately.    Problem: Discharge Barriers/Planning  Goal: Patient's continuum of care needs will be met  Outcome: PROGRESSING AS EXPECTED  Pt able to ambulate to restroom SB assist, voids appropriately, able to communicate needs effectively.

## 2017-10-30 NOTE — DISCHARGE PLANNING
Received transport form from [facility] however, [facility] van unable to transport patient today. Arranged patient's transport to [facility] tomorrow, 10/31, at 0930 via [facility] van. Adia() and Rj([facility]) notified of transport time via phone.

## 2017-12-07 ENCOUNTER — OFFICE VISIT (OUTPATIENT)
Dept: MEDICAL GROUP | Facility: PHYSICIAN GROUP | Age: 82
End: 2017-12-07
Payer: COMMERCIAL

## 2017-12-07 VITALS
SYSTOLIC BLOOD PRESSURE: 108 MMHG | HEIGHT: 63 IN | DIASTOLIC BLOOD PRESSURE: 60 MMHG | OXYGEN SATURATION: 95 % | WEIGHT: 113 LBS | TEMPERATURE: 97.5 F | RESPIRATION RATE: 14 BRPM | HEART RATE: 67 BPM | BODY MASS INDEX: 20.02 KG/M2

## 2017-12-07 DIAGNOSIS — I63.81 LACUNAR STROKE (HCC): ICD-10-CM

## 2017-12-07 DIAGNOSIS — M21.372 FOOT DROP, BILATERAL: ICD-10-CM

## 2017-12-07 DIAGNOSIS — M21.371 FOOT DROP, BILATERAL: ICD-10-CM

## 2017-12-07 PROCEDURE — 99214 OFFICE O/P EST MOD 30 MIN: CPT | Performed by: NURSE PRACTITIONER

## 2017-12-07 RX ORDER — ASPIRIN 325 MG
325 TABLET ORAL EVERY 6 HOURS PRN
COMMUNITY
End: 2017-12-12

## 2017-12-07 NOTE — ASSESSMENT & PLAN NOTE
Had stroke end of October and was seen at [facility].  Discharged and then readmitted to [facility] with extension ? TIA's? MRI from [facility] did not show extension of her lacunar stroke. She has a personal health aide with her 3 days a week helping her with meds and food. She is using a walker. She reports bilateral foot drop. States that the right foot drop was there previous to the stroke but now she is having left foot drop. Denies visual changes. Denies issues with reading and comprehension. She has not been contacted by the stroke Bridge program. Referral placed. Patient reports that she is eating and drinking well. She is 100% compliant with her medications. Her blood pressure is well controlled 108/60. Patient's home health aide reports that she is home alone at night. They tried to get formal home health but this was denied per her insurance. She did have some physical and occupational therapy prior to her discharge from [facility]. Records obtained from [facility] and reviewed.

## 2017-12-07 NOTE — Clinical Note
Dr. Thomas this very pleasant patient was a delight for me to see. I put in urgent referral to the stroke Bridge clinic as this did not happen from discharge from her now. She would like to see you prior to the end of the year and we were able to have a follow-up visit with you in a few weeks. We obtained records from [place] and they are on your desk for your review. [facility]. I sent a copy of my notes around her foot drop to a [facility] her prosthetics and orthotics.

## 2017-12-07 NOTE — LETTER
[facility]  Angelito Thomas M.D.  [address]  [facility] [address]  Fax: [phone]   Authorization for Release/Disclosure of   Protected Health Information   Name: GABRIEL ELDRIDGE : 1931 SSN: [id number]   Address: [place] [address] Phone:    [phone] (home)    I authorize the entity listed below to release/disclose the PHI below to:   [facility]/Angelito Thomas M.D. and SHAUNA Joshi   Provider or Entity Name:     Address   City, State, [facility]   Phone:      Fax:     Reason for request: continuity of care   Information to be released:    [  ] LAST COLONOSCOPY,  including any PATH REPORT and follow-up  [  ] LAST FIT/COLOGUARD RESULT [  ] LAST DEXA  [  ] LAST MAMMOGRAM  [  ] LAST PAP  [  ] LAST LABS [  ] RETINA EXAM REPORT  [  ] IMMUNIZATION RECORDS  [  ] Release all info      [  ] Check here and initial the line next to each item to release ALL health information INCLUDING  _____ Care and treatment for drug and / or alcohol abuse  _____ HIV testing, infection status, or AIDS  _____ Genetic Testing    DATES OF SERVICE OR TIME PERIOD TO BE DISCLOSED: _____________  I understand and acknowledge that:  * This Authorization may be revoked at any time by you in writing, except if your health information has already been used or disclosed.  * Your health information that will be used or disclosed as a result of you signing this authorization could be re-disclosed by the recipient. If this occurs, your re-disclosed health information may no longer be protected by State or Federal laws.  * You may refuse to sign this Authorization. Your refusal will not affect your ability to obtain treatment.  * This Authorization becomes effective upon signing and will  on (date) __________.      If no date is indicated, this Authorization will  one (1) year from the signature date.    Name: Gabriel Eldridge    Signature:   Date:     2017       PLEASE FAX  REQUESTED RECORDS BACK TO: [phone]

## 2017-12-08 NOTE — ASSESSMENT & PLAN NOTE
Patient presents with history of foot drop on the right side. She also has new onset of left foot drop. She is using a walker but has difficulty with gait and safe ambulation.

## 2017-12-08 NOTE — PROGRESS NOTES
Chief Complaint   Patient presents with   • Hospital Follow-up       Subjective:   Marifer Eldridge is a 86 y.o. White, pleasant female patient of Dr. Thomas here today for evaluation and management of:    Lacunar stroke (CMS-HCC)  Had stroke end of October and was seen at [facility].  Discharged and then readmitted to [facility] with extension ? TIA's? MRI from [facility] did not show extension of her lacunar stroke. She has a personal health aide with her 3 days a week helping her with meds and food. She is using a walker. She reports bilateral foot drop. States that the right foot drop was there previous to the stroke but now she is having left foot drop. Denies visual changes. Denies issues with reading and comprehension. She has not been contacted by the stroke Bridge program. Referral placed. Patient reports that she is eating and drinking well. She is 100% compliant with her medications. Her blood pressure is well controlled 108/60. Patient's home health aide reports that she is home alone at night. They tried to get formal home health but this was denied per her insurance. She did have some physical and occupational therapy prior to her discharge from [facility]. Records obtained from [facility] and reviewed.    Foot drop, bilateral  Patient presents with history of foot drop on the right side. She also has new onset of left foot drop. She is using a walker but has difficulty with gait and safe ambulation.         Current medicines (including changes today)  Current Outpatient Prescriptions   Medication Sig Dispense Refill   • aspirin (ASA) 325 MG Tab Take 325 mg by mouth every 6 hours as needed.     • atorvastatin (LIPITOR) 40 MG Tab Take 1 Tab by mouth every evening. 30 Tab 0   • clopidogrel (PLAVIX) 75 MG Tab Take 1 Tab by mouth every day. 30 Tab 0   • magnesium oxide (MAG-OX) 400 (241.3 Mg) MG Tab tablet Take 1 Tab by mouth every day. 30 Tab 0   • potassium chloride SA (KDUR) 20 MEQ Tab CR Take 1 Tab by  "mouth every day. 30 Tab 2   • oxybutynin (DITROPAN) 5 MG Tab Take 5 mg by mouth 4 times a day as needed.     • triamterene/hctz (MAXZIDE-25/DYAZIDE) 37.5-25 MG Cap TAKE 1 CAPSULE EVERY       MORNING 90 Cap 3   • paroxetine (PAXIL) 20 MG Tab TAKE 1 TABLET DAILY WITH   FOOD 90 Tab 3   • diphenoxylate-atropine (LOMOTIL) 2.5-0.025 MG Tab TAKE  (1)  TABLET  FOUR TIMES DAILY AS NEEDED FOR DIARRHEA. 90 Tab 1   • propranolol (INDERAL) 10 MG Tab TAKE (1) TABLET BY MOUTH TWICE DAILY. 180 Tab 3   • alendronate (FOSAMAX) 70 MG Tab TAKE 1 TABLET EVERY 7 DAYS 12 Tab 3   • metaxalone 400 MG Tab Take 400-800 mg by mouth 3 times a day as needed for Muscle Spasms. 60 Tab 2   • latanoprost (XALATAN) 0.005 % SOLN Place 1 Drop in right eye every evening.     • vitamin D (CHOLECALCIFEROL) 1000 UNIT TABS Take 1 Tab by mouth every day. 30 Tab 3     No current facility-administered medications for this visit.      She  has a past medical history of Arthritis; Cancer (CMS-[facility]) (2004); Dental disorder; Essential hypertension, benign; Generalized anxiety disorder; Heart murmur; Other specified disorder of intestines; Overactive bladder; Pain; Panic attacks; Reactive airway disease; Symptomatic menopausal or female climacteric states; Unspecified cataract; Unspecified pleural effusion; and Unspecified urinary incontinence. She also has no past medical history of Hyperlipidemia.    [place] stated in history of present illness  No chest pain, no shortness of breath, no abdominal pain       Objective:     Blood pressure 108/60, pulse 67, temperature 36.4 °C (97.5 °F), resp. rate 14, height 1.6 m (5' 3\"), weight 51.3 kg (113 lb), last menstrual period 03/20/2008, SpO2 95 %. Body mass index is 20.02 kg/m². Weight has improved since discharge from the hospital she is up 4 pounds.  Physical Exam:  Constitutional: Alert, no distress.  Skin: Warm, dry, good turgor,no cyanosis, no rashes in visible areas.  Eye: Equal, round and reactive, conjunctiva clear, " lids normal.  Ears: No tenderness, no discharge.  External canals are without any significant edema or erythema.  Tympanic membranes are without any inflammation, no effusion.  Gross auditory acuity is intact.  Nose: symmetrical without tenderness, no discharge.  Mouth/Throat: lips without lesion.  Oropharynx clear.  Throat without erythema, exudates or tonsillar enlargement. No facial droop appreciated  Neck: Trachea midline, no masses, no obvious thyroid enlargement.. No cervical or supraclavicular lymphadenopathy. Range of motion within normal limits.  Neuro: Cranial nerves 2-12 grossly intact.  No sensory deficit. DTRs 2+ bilaterally  Respiratory: Unlabored respiratory effort, lungs clear to auscultation, no wheezes, no ronchi.  Cardiovascular: Normal S1, S2, no murmur, no edema. No carotid bruit appreciated  Abdomen: Soft, non-tender, no masses, no guarding,  no hepatosplenomegaly.  MSK:  Weakness and foot drop bilaterally. Difficulty with gait in the room and balance. Using a walker. Weakness and ankles and feet.  Psych: Alert and oriented x3, normal affect and mood and judgement.        Assessment and Plan:   The following treatment plan was discussed    1. Lacunar stroke (CMS-HCC)  This is a new problem to me. Acute. Stabilized. Urgent referral placed to the stroke Bridge program. This did not happen when she was in the hospital. Continue with medications including aspirin, Plavix and atorvastatin for stroke prevention. Continue with blood pressure medication blood pressure is well controlled today. Patient to return to see her primary care provider in 3 weeks. Appointment made. Monitor.  - REFERRAL TO NEUROLOGY    2. Foot drop, bilateral  This is a new problem to me. Ongoing issue. She has a chronic right foot drop with long-standing neurological conditions that effect both lower extremities her gait and balance. This seems to have increased with the recent stroke to include her left foot and ankle. Patient  needs some help with control of her ankles bilaterally and her foot in multiple planes. Patient would benefit greatly from bilateral bilateral custom orthotics to help her with gait and balance to prevent falls. Patient does have osteoporosis documented in her chart she is taking Fosamax weekly. Patient did have a recent fall and was seen at [facility].      Followup: Return in about 3 weeks (around 12/28/2017) for stroke follow-up with PCP.

## 2017-12-12 ENCOUNTER — OFFICE VISIT (OUTPATIENT)
Dept: NEUROLOGY | Facility: MEDICAL CENTER | Age: 82
End: 2017-12-12
Payer: COMMERCIAL

## 2017-12-12 VITALS
TEMPERATURE: 98.7 F | HEIGHT: 63 IN | HEART RATE: 70 BPM | RESPIRATION RATE: 16 BRPM | BODY MASS INDEX: 19.49 KG/M2 | OXYGEN SATURATION: 94 % | WEIGHT: 110 LBS | SYSTOLIC BLOOD PRESSURE: 108 MMHG | DIASTOLIC BLOOD PRESSURE: 60 MMHG

## 2017-12-12 DIAGNOSIS — I63.019 CEREBROVASCULAR ACCIDENT (CVA) DUE TO THROMBOSIS OF VERTEBRAL ARTERY, UNSPECIFIED BLOOD VESSEL LATERALITY (HCC): Primary | ICD-10-CM

## 2017-12-12 DIAGNOSIS — G62.9 POLYNEUROPATHY: ICD-10-CM

## 2017-12-12 PROCEDURE — 99205 OFFICE O/P NEW HI 60 MIN: CPT | Performed by: PSYCHIATRY & NEUROLOGY

## 2017-12-12 RX ORDER — CHLORAL HYDRATE 500 MG
1000 CAPSULE ORAL DAILY
Qty: 30 CAP | Refills: 0 | COMMUNITY
Start: 2017-12-12 | End: 2019-08-16

## 2017-12-12 ASSESSMENT — ENCOUNTER SYMPTOMS
MEMORY LOSS: 0
SPEECH CHANGE: 0
LOSS OF CONSCIOUSNESS: 0
FALLS: 1
SENSORY CHANGE: 1
TINGLING: 0
DEPRESSION: 0
FOCAL WEAKNESS: 1
TREMORS: 1

## 2017-12-12 ASSESSMENT — PAIN SCALES - GENERAL: PAINLEVEL: NO PAIN

## 2017-12-12 NOTE — PROGRESS NOTES
Subjective:      Marifer Eldridge is a 86 y.o. female who presents with her brother Sea, for consultation, hospitalized on October 21, 2017, with acute onset of aphasia and confusion [facility] imaging studies demonstrated acute embolic stroke.    HPI    Patient is a pleasant 86-year-old ambidextrous female who had just been discharged from the hospital several days before her symptoms started. She was admitted for confusion and subsequently was diagnosed with a UTI and sepsis. With her discharge set, she was at home with care, the confusion had improved, and then she had a sudden episode of what sounds like was an apraxia and speech arrest. She had difficulty manipulating a TV controller at the time. Brought to the hospital again, the cognitive symptoms improved notably, but she was left with some worsening balance problems.    I reviewed the electronic health record, she was admitted on aspirin and a long list of other medicines, discharge with the addition of Plavix and atorvastatin 40 mg daily. A workup was thorough, including MRI scan of the brain which revealed 2 small punctate areas of infarct in the right cerebellar vermis and hemisphere, another in the right head of the caudate nucleus. There is evidence of a moderate degree of brainstem atherosclerotic change and lacunar infarction in the natacha, echocardiogram revealed a normal ejection fraction without evidence of thrombus, vascular imaging studies revealed no critical stenosis. Blood work revealed a normal lipid panel, unremarkable TSH and inflammatory markers, so she was discharged on aspirin/Plavix/atorvastatin.    She still has some residual balance problems, though the balance difficulties predated this. She is always suffered from foot drop with numbness in her feet, though she cannot be sure when exactly things started, Sea believes it was after her diagnosis of ovarian cancer and treatment. The weakness is bilateral and symmetric, the numbness as  "well, nothing has ascended or progressed. She describes the balance problems now is one of an unsteadiness when she walks, unaffected by ambient light intensities. She is now using a walker, hopefully with therapy she will become less dependent on an assistive device. Speech is clear, there is no loss of dexterity with the hands but she has noted some tremulousness with her hands which seems to worsen with action and activity as well as when she is tired and anxious. She denies any tremor at rest. Voice volume and quality are maintained, she is not drooling and denies issues of swallowing compromise.    Medical, surgical and family histories are reviewed, there are no new drug allergies. Other than hypertension, ovarian cancer, and osteopenia, she denies a history of CAD, prior CVA, PVD, thyroid disease, autoimmune disease, demyelinating disease, neurodegenerative disease, ASHLI, periodontal disease, diabetes or psychiatric disease. There is no surgical history of note from my standpoint. Neither she nor Sea are aware of anyone in the family tree with a history of stroke, there is a history of heart disease. She does not smoke or drink. A , she is still at home with home health care assistance.    She is on adult aspirin daily, Plavix 75 mg daily, Lipitor 40 mg daily, Maxide, Ditropan, Paxil, Inderal, Fosamax, the rest as per the electronic health record.    Review of Systems   Constitutional: Positive for malaise/fatigue.   Musculoskeletal: Positive for falls.   Neurological: Positive for tremors, sensory change and focal weakness. Negative for tingling, speech change and loss of consciousness.   Psychiatric/Behavioral: Negative for depression and memory loss.   All other systems reviewed and are negative.       Objective:     /60   Pulse 70   Temp 37.1 °C (98.7 °F)   Resp 16   Ht 1.6 m (5' 3\")   Wt 49.9 kg (110 lb)   LMP 03/20/2008   SpO2 94%   BMI 19.49 kg/m²      Physical Exam    She appears " in no acute distress. Vital signs are stable. There is no malar rash. The neck is supple, range of motion is full, carotid pulses are present bilaterally without asymmetry or bruits. Cardiac evaluation reveals a regular rhythm. There is some darkened red discoloration of the distal lower extremities below the midshin. Distal pulses are diminished. There is slight edema.    She is fully oriented, her mental processing speed is just slowed. There is no aphasia, apraxia, or inattention.    PERRLA/EOMI, there is no bradykinesia or hypophonia, visual fields are full, there is no facial asymmetry or sensory loss across the midline, the tongue and uvula midline, jaw jerk is absent, shoulder shrug is symmetric.    The fine tremulousness is seen with the hands through a full range of motion, of low amplitude high frequency, it attenuates at rest. Tone is normal in the upper extremities, even with distraction. There is no asterixis or drift. Strength is intact in the upper extremities, there is weakness with both dorsiflexion and to a lesser degree plantar flexion in the feet, inversion and eversion are also slightly affected. Reflexes are diffusely hypoactive, they are symmetric in the upper extremities, the ankle jerks and knee jerks are absent bilaterally. Both toes are downgoing.    She is unsteady when she walks, she requires use of her walker or the examiner's arm simply stand as well. There is no appendicular dystaxia in the upper extremities. Repetitive movements with the hands are intact with good amplitude and frequencies. The tremulousness she has in the upper extremities is seen through a full range of motion and does not increase with intention.    Sensory exam reveals a dense loss of vibration to the knees, and a graded loss of temperature to the midshin. These modalities are intact in the upper extremities.     Assessment/Plan:     1. 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 Regardless, she also has a significant sensorimotor neuropathy combined with loss of endurance that she is still dealing with following her sepsis, all of his contributing. She will continue her activities at home, physical and occupational therapies critical in this regard.    Face-to-face time spent reviewing all of the above. Her workup was thorough when she was admitted. It does not need to be repeated. I would recommend discontinuing aspirin, using Plavix monotherapy over the long-term combined with Lipitor 40 mg and fish oil 1000 mg daily, the latter to even in the setting of normalized lipid panels. Additional neurologic follow-up is not indicated at this time.    - Omega-3 Fatty Acids (FISH OIL) 1000 MG Cap capsule; Take 1 Cap by mouth every day.  Dispense: 30 Cap; Refill: 0    2. Polyneuropathy (CMS-HCC)  I suspect this may be a process related to the chemotherapy she had received for her ovarian cancer in the past. Subjectively things have not worsened, but the neuropathy itself is quite significant. She is scheduled to receive bilateral ankle braces which should help her walking. For now, there is no need for long-term and continuing neurologic follow-up.    Time: Evaluation of 60 minutes were exam, review, discussion, and education  Discussion: As mentioned in the assessment, over 50% of the time spent face-to-face counseling and coordinating care

## 2017-12-19 DIAGNOSIS — M85.80 OSTEOPENIA: ICD-10-CM

## 2017-12-19 RX ORDER — ALENDRONATE SODIUM 70 MG/1
TABLET ORAL
Qty: 12 TAB | Refills: 3 | Status: SHIPPED | OUTPATIENT
Start: 2017-12-19 | End: 2019-01-22 | Stop reason: SDUPTHER

## 2017-12-19 NOTE — TELEPHONE ENCOUNTER
Was the patient seen in the last year in this department? Yes  LOV 08/23/17    Does patient have an active prescription for medications requested? No     Received Request Via: Pharmacy

## 2017-12-20 ENCOUNTER — OFFICE VISIT (OUTPATIENT)
Dept: MEDICAL GROUP | Facility: PHYSICIAN GROUP | Age: 82
End: 2017-12-20
Payer: COMMERCIAL

## 2017-12-20 VITALS
TEMPERATURE: 98.2 F | OXYGEN SATURATION: 95 % | DIASTOLIC BLOOD PRESSURE: 66 MMHG | BODY MASS INDEX: 20.24 KG/M2 | HEART RATE: 67 BPM | SYSTOLIC BLOOD PRESSURE: 118 MMHG | WEIGHT: 110 LBS | HEIGHT: 62 IN

## 2017-12-20 DIAGNOSIS — I10 ESSENTIAL HYPERTENSION: ICD-10-CM

## 2017-12-20 DIAGNOSIS — Z85.43 PERSONAL HISTORY OF OVARIAN CANCER: ICD-10-CM

## 2017-12-20 DIAGNOSIS — R25.1 TREMOR: ICD-10-CM

## 2017-12-20 DIAGNOSIS — I63.81 LACUNAR STROKE (HCC): ICD-10-CM

## 2017-12-20 DIAGNOSIS — R01.1 SYSTOLIC MURMUR: ICD-10-CM

## 2017-12-20 DIAGNOSIS — M21.371 FOOT DROP, BILATERAL: ICD-10-CM

## 2017-12-20 DIAGNOSIS — M21.372 FOOT DROP, BILATERAL: ICD-10-CM

## 2017-12-20 PROCEDURE — 99214 OFFICE O/P EST MOD 30 MIN: CPT | Performed by: FAMILY MEDICINE

## 2017-12-20 RX ORDER — GAUZE BANDAGE 2" X 2"
BANDAGE TOPICAL
COMMUNITY
End: 2019-08-16

## 2017-12-20 RX ORDER — PROPRANOLOL HYDROCHLORIDE 20 MG/1
TABLET ORAL
Qty: 180 TAB | Refills: 3 | Status: SHIPPED | OUTPATIENT
Start: 2017-12-20 | End: 2019-08-16

## 2017-12-20 ASSESSMENT — PAIN SCALES - GENERAL: PAINLEVEL: NO PAIN

## 2017-12-21 NOTE — PATIENT INSTRUCTIONS
Patient given written instructions regarding labs,  medications, referrals, dietary and lifestyle management, and return visit.    Angelito Thomas MD

## 2017-12-21 NOTE — PROGRESS NOTES
Patient comes in. She has gotten the braces for her bilateral foot drop and she greatly appreciates these. She is complaining however still of some weakness and she would like to have some physical therapy to see if she can build up the strength in her legs. She has [facility] and they have advised Lipitor and Plavix and she is taking these medications.  She complains of a tremor we had had her on low-dose propranolol 10 mg by mouth twice a day which didn't really help that much. She was not bothered by this medicine, however. I'm going to increase the dose to 20 mg twice a day.  Her insurance has changed and she is probably going to have to get care from [facility] or [facility].  I appreciate Yenifer Barraza' past care and management of this patient    I reviewed the following    Past Medical History:   Diagnosis Date   • Arthritis     back   • Cancer (CMS-[facility]) 2004    ovarian   • Dental disorder     upper partial   • Essential hypertension, benign    • Generalized anxiety disorder    • Heart murmur     systolic   • Other specified disorder of intestines     diarrhea from some foods   • Overactive bladder    • Pain     low back pain   • Panic attacks    • Reactive airway disease     uses no inhalers   • Symptomatic menopausal or female climacteric states    • Unspecified cataract     bilateral IOL   • Unspecified pleural effusion    • Unspecified urinary incontinence         Past Surgical History:   Procedure Laterality Date   • LUMBAR LAMINECTOMY DISKECTOMY  1/27/2015    Performed by Maxwell Lei M.D. at SURGERY [facility] ORS   • COLONOSCOPY  3/2010    Normal    • OOPHORECTOMY  5/2008       Dr Dee   • THORACOTOMY  01/2008    pleurodesis   • ANGIOGRAM  02/23/2007    cardiac  wnl   • ABDOMINAL EXPLORATION     • ABDOMINAL HYSTERECTOMY TOTAL      still has 1 ovary   • COLONOSCOPY  1999, 2005    both ok        Allergies   Allergen Reactions   • Metrogel      Headaches   • Tessalon  [Benzonatate]      Headache     • Zoloft      headache       Current Outpatient Prescriptions   Medication Sig Dispense Refill   • B Complex-C (SUPER B COMPLEX/VITAMIN C) Tab Take  by mouth.     • propranolol (INDERAL) 20 MG Tab TAKE (1) TABLET BY MOUTH TWICE DAILY. 180 Tab 3   • alendronate (FOSAMAX) 70 MG Tab TAKE 1 TABLET BY MOUTH ONCE WEEKLY. 12 Tab 3   • Omega-3 Fatty Acids (FISH OIL) 1000 MG Cap capsule Take 1 Cap by mouth every day. 30 Cap 0   • atorvastatin (LIPITOR) 40 MG Tab Take 1 Tab by mouth every evening. 30 Tab 0   • clopidogrel (PLAVIX) 75 MG Tab Take 1 Tab by mouth every day. 30 Tab 0   • magnesium oxide (MAG-OX) 400 (241.3 Mg) MG Tab tablet Take 1 Tab by mouth every day. 30 Tab 0   • potassium chloride SA (KDUR) 20 MEQ Tab CR Take 1 Tab by mouth every day. 30 Tab 2   • oxybutynin (DITROPAN) 5 MG Tab Take 5 mg by mouth 4 times a day as needed.     • triamterene/hctz (MAXZIDE-25/DYAZIDE) 37.5-25 MG Cap TAKE 1 CAPSULE EVERY       MORNING 90 Cap 3   • paroxetine (PAXIL) 20 MG Tab TAKE 1 TABLET DAILY WITH   FOOD 90 Tab 3   • diphenoxylate-atropine (LOMOTIL) 2.5-0.025 MG Tab TAKE  (1)  TABLET  FOUR TIMES DAILY AS NEEDED FOR DIARRHEA. 90 Tab 1   • metaxalone 400 MG Tab Take 400-800 mg by mouth 3 times a day as needed for Muscle Spasms. 60 Tab 2   • latanoprost (XALATAN) 0.005 % SOLN Place 1 Drop in right eye every evening.     • vitamin D (CHOLECALCIFEROL) 1000 UNIT TABS Take 1 Tab by mouth every day. 30 Tab 3     No current facility-administered medications for this visit.         History reviewed. No pertinent family history.    Social History     Social History   • Marital status:      Spouse name: N/A   • Number of children: N/A   • Years of education: N/A     Occupational History   • Not on file.     Social History Main Topics   • Smoking status: Never Smoker   • Smokeless tobacco: Never Used   • Alcohol use No   • Drug use: No   • Sexual activity: No     Other Topics Concern   • Not on file      Social History Narrative   • No narrative on file      Physical Exam   Constitutional: She is oriented. She appears well-developed and well-nourished. No distress.   HENT:  Head: Normocephalic and atraumatic.   Right Ear: External ear normal. Ear canal and TM normal   Left Ear: External ear normal. Ear canal and TM normal  Nose: Nose normal.   Mouth/Throat: Oropharynx is clear and moist.   Eyes: Conjunctivae and extraocular motions are normal. Pupils are equal, round, and reactive to light. Fundi benign bilaterally   Neck: No thyromegaly present.   Cardiovascular: Normal rate, regular rhythm, normal heart sounds and intact distal pulses.  Exam reveals no gallop.    Grade 2/6 systolic murmur lower left sternal border. This is unchanged from prior exams  Pulmonary/Chest: Effort normal and breath sounds normal. No respiratory distress. She has no wheezes. She has no rales.   Abdominal: Soft. Bowel sounds are normal. No hepatosplenomegaly She exhibits no distension. No tenderness. She has no rebound and no guarding.   Musculoskeletal: She has bilateral braces on the backs of her calves and ankles which are helping her quite a bit. She does have some weakness in the extensors of her legs.  is equal bilaterally. She exhibits no edema and no tenderness.   Lymphadenopathy:     She has no cervical adenopathy.   No supraclavicular adenopathy  Neurological: She is alert and oriented. She has normal reflexes.        Babinskis downgoing bilaterally   Skin: Skin is warm and dry. No rash noted. No erythema.   Psychiatric: She has a normal mood and appropriate affect. Her behavior is normal. Judgment and thought content normal.     1. Foot drop, bilateral  REFERRAL TO PHYSICAL THERAPY Reason for Therapy: Eval/Treat/Report--[facility]    2. Personal history of ovarian cancer   Stable for now    3. Essential hypertension   Controlled    4. Lacunar stroke (CMS-HCC)  REFERRAL TO PHYSICAL THERAPY  Reason for Therapy: Eval/Treat/Report   5. Tremor  propranolol (INDERAL) 20 MG Tab--increase dose to one by mouth twice a day    6. Systolic murmur   Stable      I wish the patient the best. I'm going to miss her in the future.    Please note that this dictation was created using voice recognition software. I have worked with consultants from the vendor as well as technical experts from [facility] to optimize the interface. I have made every reasonable attempt to correct obvious errors, but I expect that there are errors of grammar and possibly content that I did not discover before finalizing the note.

## 2018-04-05 ENCOUNTER — HOSPITAL ENCOUNTER (OUTPATIENT)
Dept: RADIOLOGY | Facility: MEDICAL CENTER | Age: 83
End: 2018-04-05
Attending: INTERNAL MEDICINE
Payer: COMMERCIAL

## 2018-04-05 DIAGNOSIS — C56.9 MALIGNANT NEOPLASM OF OVARY, UNSPECIFIED LATERALITY (HCC): ICD-10-CM

## 2018-04-05 PROCEDURE — 78815 PET IMAGE W/CT SKULL-THIGH: CPT

## 2018-08-15 RX ORDER — METAXALONE 800 MG/1
800 TABLET ORAL 3 TIMES DAILY PRN
Qty: 90 TAB | Refills: 0 | Status: SHIPPED | OUTPATIENT
Start: 2018-08-15 | End: 2018-11-05 | Stop reason: SDUPTHER

## 2018-09-18 DIAGNOSIS — F41.0 PANIC ATTACKS: ICD-10-CM

## 2018-09-18 RX ORDER — PAROXETINE HYDROCHLORIDE 20 MG/1
TABLET, FILM COATED ORAL
Qty: 90 TAB | Refills: 1 | Status: SHIPPED | OUTPATIENT
Start: 2018-09-18 | End: 2019-03-18 | Stop reason: SDUPTHER

## 2018-09-18 NOTE — TELEPHONE ENCOUNTER
Was the patient seen in the last year in this department? Yes LOV 12/20/17    Does patient have an active prescription for medications requested? No     Received Request Via: Pharmacy

## 2018-10-22 DIAGNOSIS — I10 ESSENTIAL HYPERTENSION: ICD-10-CM

## 2018-10-23 RX ORDER — TRIAMTERENE AND HYDROCHLOROTHIAZIDE 37.5; 25 MG/1; MG/1
CAPSULE ORAL
Qty: 90 CAP | Refills: 0 | Status: SHIPPED | OUTPATIENT
Start: 2018-10-23

## 2018-10-23 NOTE — TELEPHONE ENCOUNTER
Refills approved.  Please notify patient that she needs to have labs drawn and have an appointment with PCP in the next month.  Thanks

## 2018-11-05 RX ORDER — OXYBUTYNIN CHLORIDE 5 MG/1
TABLET ORAL
Qty: 120 TAB | Refills: 3 | Status: SHIPPED | OUTPATIENT
Start: 2018-11-05 | End: 2019-12-30

## 2018-11-06 RX ORDER — METAXALONE 800 MG/1
800 TABLET ORAL 3 TIMES DAILY PRN
Qty: 90 TAB | Refills: 1 | Status: SHIPPED | OUTPATIENT
Start: 2018-11-06 | End: 2019-04-29 | Stop reason: SDUPTHER

## 2018-12-19 NOTE — TELEPHONE ENCOUNTER
Was the patient seen in the last year in this department? Yes LOV 12/20/17 No Upcoming appointment    Does patient have an active prescription for medications requested? No     Received Request Via: Pharmacy

## 2019-01-22 DIAGNOSIS — M85.80 OSTEOPENIA: ICD-10-CM

## 2019-01-22 RX ORDER — ALENDRONATE SODIUM 70 MG/1
TABLET ORAL
Qty: 12 TAB | Refills: 0 | Status: SHIPPED | OUTPATIENT
Start: 2019-01-22 | End: 2019-04-29 | Stop reason: SDUPTHER

## 2019-01-22 NOTE — TELEPHONE ENCOUNTER
Was the patient seen in the last year in this department? No     Does patient have an active prescription for medications requested? NO      Received Request Via: Pharmacy

## 2019-03-18 DIAGNOSIS — F41.0 PANIC ATTACKS: ICD-10-CM

## 2019-03-18 RX ORDER — PAROXETINE HYDROCHLORIDE 20 MG/1
TABLET, FILM COATED ORAL
Qty: 90 TAB | Refills: 0 | Status: SHIPPED | OUTPATIENT
Start: 2019-03-18 | End: 2019-06-17 | Stop reason: SDUPTHER

## 2019-03-18 NOTE — TELEPHONE ENCOUNTER
Was the patient seen in the last year in this department? No last seen 12/20/2017    Does patient have an active prescription for medications requested? No     Received Request Via: Pharmacy

## 2019-04-29 DIAGNOSIS — M85.80 OSTEOPENIA, UNSPECIFIED LOCATION: ICD-10-CM

## 2019-04-29 RX ORDER — ALENDRONATE SODIUM 70 MG/1
TABLET ORAL
Qty: 12 TAB | Refills: 0 | Status: SHIPPED | OUTPATIENT
Start: 2019-04-29 | End: 2019-07-17 | Stop reason: SDUPTHER

## 2019-04-29 RX ORDER — METAXALONE 800 MG/1
TABLET ORAL
Qty: 90 TAB | Refills: 0 | Status: SHIPPED | OUTPATIENT
Start: 2019-04-29 | End: 2019-07-17 | Stop reason: SDUPTHER

## 2019-04-29 NOTE — TELEPHONE ENCOUNTER
Was the patient seen in the last year in this department? No LOV 12/20/17 NO UP COMING APPOINTMENT.  LABS 10/24/17 LAST DEXA SCAN 12/05/13    Does patient have an active prescription for medications requested? No     Received Request Via: Pharmacy

## 2019-06-17 DIAGNOSIS — F41.0 PANIC ATTACKS: ICD-10-CM

## 2019-06-17 RX ORDER — PAROXETINE HYDROCHLORIDE 20 MG/1
TABLET, FILM COATED ORAL
Qty: 90 TAB | Refills: 0 | Status: SHIPPED | OUTPATIENT
Start: 2019-06-17

## 2019-06-17 NOTE — TELEPHONE ENCOUNTER
Was the patient seen in the last year in this department? No   LOV 12/20/17 No Up coming Denny. LABS 10/23/17   Does patient have an active prescription for medications requested? No     Received Request Via: Pharmacy

## 2019-08-16 ENCOUNTER — HOSPITAL ENCOUNTER (INPATIENT)
Facility: MEDICAL CENTER | Age: 84
LOS: 1 days | DRG: 395 | End: 2019-08-17
Attending: EMERGENCY MEDICINE | Admitting: HOSPITALIST
Payer: COMMERCIAL

## 2019-08-16 ENCOUNTER — APPOINTMENT (OUTPATIENT)
Dept: RADIOLOGY | Facility: MEDICAL CENTER | Age: 84
DRG: 395 | End: 2019-08-16
Attending: EMERGENCY MEDICINE
Payer: COMMERCIAL

## 2019-08-16 DIAGNOSIS — K92.2 LOWER GI BLEED: ICD-10-CM

## 2019-08-16 DIAGNOSIS — Z79.02 ANTIPLATELET OR ANTITHROMBOTIC LONG-TERM USE: ICD-10-CM

## 2019-08-16 PROBLEM — K92.1 HEMATOCHEZIA: Status: ACTIVE | Noted: 2019-08-16

## 2019-08-16 PROBLEM — Z86.73 HISTORY OF STROKE: Status: ACTIVE | Noted: 2019-08-16

## 2019-08-16 LAB
ABO GROUP BLD: NORMAL
ALBUMIN SERPL BCP-MCNC: 3.7 G/DL (ref 3.2–4.9)
ALBUMIN/GLOB SERPL: 1.5 G/DL
ALP SERPL-CCNC: 64 U/L (ref 30–99)
ALT SERPL-CCNC: 29 U/L (ref 2–50)
ANION GAP SERPL CALC-SCNC: 7 MMOL/L (ref 0–11.9)
APTT PPP: 28.1 SEC (ref 24.7–36)
AST SERPL-CCNC: 37 U/L (ref 12–45)
BASOPHILS # BLD AUTO: 0.5 % (ref 0–1.8)
BASOPHILS # BLD: 0.02 K/UL (ref 0–0.12)
BILIRUB SERPL-MCNC: 0.7 MG/DL (ref 0.1–1.5)
BLD GP AB SCN SERPL QL: NORMAL
BUN SERPL-MCNC: 24 MG/DL (ref 8–22)
CALCIUM SERPL-MCNC: 8.9 MG/DL (ref 8.5–10.5)
CHLORIDE SERPL-SCNC: 101 MMOL/L (ref 96–112)
CO2 SERPL-SCNC: 29 MMOL/L (ref 20–33)
CREAT SERPL-MCNC: 0.73 MG/DL (ref 0.5–1.4)
EOSINOPHIL # BLD AUTO: 0.11 K/UL (ref 0–0.51)
EOSINOPHIL NFR BLD: 2.8 % (ref 0–6.9)
ERYTHROCYTE [DISTWIDTH] IN BLOOD BY AUTOMATED COUNT: 42.9 FL (ref 35.9–50)
GLOBULIN SER CALC-MCNC: 2.5 G/DL (ref 1.9–3.5)
GLUCOSE SERPL-MCNC: 86 MG/DL (ref 65–99)
HCT VFR BLD AUTO: 36.2 % (ref 37–47)
HGB BLD-MCNC: 12.2 G/DL (ref 12–16)
IMM GRANULOCYTES # BLD AUTO: 0.01 K/UL (ref 0–0.11)
IMM GRANULOCYTES NFR BLD AUTO: 0.3 % (ref 0–0.9)
INR PPP: 1.04 (ref 0.87–1.13)
LIPASE SERPL-CCNC: 25 U/L (ref 11–82)
LYMPHOCYTES # BLD AUTO: 1.06 K/UL (ref 1–4.8)
LYMPHOCYTES NFR BLD: 27.2 % (ref 22–41)
MCH RBC QN AUTO: 34 PG (ref 27–33)
MCHC RBC AUTO-ENTMCNC: 33.7 G/DL (ref 33.6–35)
MCV RBC AUTO: 100.8 FL (ref 81.4–97.8)
MONOCYTES # BLD AUTO: 0.37 K/UL (ref 0–0.85)
MONOCYTES NFR BLD AUTO: 9.5 % (ref 0–13.4)
NEUTROPHILS # BLD AUTO: 2.33 K/UL (ref 2–7.15)
NEUTROPHILS NFR BLD: 59.7 % (ref 44–72)
NRBC # BLD AUTO: 0 K/UL
NRBC BLD-RTO: 0 /100 WBC
PLATELET # BLD AUTO: 178 K/UL (ref 164–446)
PMV BLD AUTO: 9.8 FL (ref 9–12.9)
POTASSIUM SERPL-SCNC: 3.9 MMOL/L (ref 3.6–5.5)
PROT SERPL-MCNC: 6.2 G/DL (ref 6–8.2)
PROTHROMBIN TIME: 13.9 SEC (ref 12–14.6)
RBC # BLD AUTO: 3.59 M/UL (ref 4.2–5.4)
RH BLD: NORMAL
SODIUM SERPL-SCNC: 137 MMOL/L (ref 135–145)
WBC # BLD AUTO: 3.9 K/UL (ref 4.8–10.8)

## 2019-08-16 PROCEDURE — 99285 EMERGENCY DEPT VISIT HI MDM: CPT

## 2019-08-16 PROCEDURE — 85025 COMPLETE CBC W/AUTO DIFF WBC: CPT

## 2019-08-16 PROCEDURE — 86900 BLOOD TYPING SEROLOGIC ABO: CPT

## 2019-08-16 PROCEDURE — 302135 SEQUENTIAL COMPRESSION MACHINE: Performed by: HOSPITALIST

## 2019-08-16 PROCEDURE — 700102 HCHG RX REV CODE 250 W/ 637 OVERRIDE(OP): Performed by: HOSPITALIST

## 2019-08-16 PROCEDURE — 80053 COMPREHEN METABOLIC PANEL: CPT

## 2019-08-16 PROCEDURE — 71045 X-RAY EXAM CHEST 1 VIEW: CPT

## 2019-08-16 PROCEDURE — A9270 NON-COVERED ITEM OR SERVICE: HCPCS | Performed by: HOSPITALIST

## 2019-08-16 PROCEDURE — 85610 PROTHROMBIN TIME: CPT

## 2019-08-16 PROCEDURE — 99221 1ST HOSP IP/OBS SF/LOW 40: CPT | Performed by: HOSPITALIST

## 2019-08-16 PROCEDURE — 86850 RBC ANTIBODY SCREEN: CPT

## 2019-08-16 PROCEDURE — 85730 THROMBOPLASTIN TIME PARTIAL: CPT

## 2019-08-16 PROCEDURE — 86901 BLOOD TYPING SEROLOGIC RH(D): CPT

## 2019-08-16 PROCEDURE — 83690 ASSAY OF LIPASE: CPT

## 2019-08-16 PROCEDURE — 770006 HCHG ROOM/CARE - MED/SURG/GYN SEMI*

## 2019-08-16 RX ORDER — ONDANSETRON 4 MG/1
4 TABLET, ORALLY DISINTEGRATING ORAL EVERY 4 HOURS PRN
Status: DISCONTINUED | OUTPATIENT
Start: 2019-08-16 | End: 2019-08-17 | Stop reason: HOSPADM

## 2019-08-16 RX ORDER — HYDROCORTISONE ACETATE 25 MG/1
25 SUPPOSITORY RECTAL EVERY 12 HOURS
Status: DISCONTINUED | OUTPATIENT
Start: 2019-08-16 | End: 2019-08-17 | Stop reason: HOSPADM

## 2019-08-16 RX ORDER — METAXALONE 800 MG/1
800 TABLET ORAL EVERY 8 HOURS PRN
Status: DISCONTINUED | OUTPATIENT
Start: 2019-08-16 | End: 2019-08-17 | Stop reason: HOSPADM

## 2019-08-16 RX ORDER — TRIAMTERENE AND HYDROCHLOROTHIAZIDE 37.5; 25 MG/1; MG/1
1 CAPSULE ORAL DAILY
Status: DISCONTINUED | OUTPATIENT
Start: 2019-08-17 | End: 2019-08-17 | Stop reason: HOSPADM

## 2019-08-16 RX ORDER — PRIMIDONE 50 MG/1
50 TABLET ORAL DAILY
COMMUNITY

## 2019-08-16 RX ORDER — ACETAMINOPHEN 325 MG/1
650 TABLET ORAL EVERY 6 HOURS PRN
Status: DISCONTINUED | OUTPATIENT
Start: 2019-08-16 | End: 2019-08-17 | Stop reason: HOSPADM

## 2019-08-16 RX ORDER — OXYBUTYNIN CHLORIDE 5 MG/1
5 TABLET ORAL 2 TIMES DAILY
Status: DISCONTINUED | OUTPATIENT
Start: 2019-08-16 | End: 2019-08-17 | Stop reason: HOSPADM

## 2019-08-16 RX ORDER — PROPRANOLOL HYDROCHLORIDE 10 MG/1
5 TABLET ORAL 2 TIMES DAILY
COMMUNITY

## 2019-08-16 RX ORDER — ACETAMINOPHEN 500 MG
500 TABLET ORAL EVERY 6 HOURS PRN
COMMUNITY

## 2019-08-16 RX ORDER — ONDANSETRON 2 MG/ML
4 INJECTION INTRAMUSCULAR; INTRAVENOUS EVERY 4 HOURS PRN
Status: DISCONTINUED | OUTPATIENT
Start: 2019-08-16 | End: 2019-08-17 | Stop reason: HOSPADM

## 2019-08-16 RX ORDER — ATORVASTATIN CALCIUM 40 MG/1
40 TABLET, FILM COATED ORAL EVERY EVENING
Status: DISCONTINUED | OUTPATIENT
Start: 2019-08-16 | End: 2019-08-17 | Stop reason: HOSPADM

## 2019-08-16 RX ORDER — PAROXETINE 10 MG/1
20 TABLET, FILM COATED ORAL DAILY
Status: DISCONTINUED | OUTPATIENT
Start: 2019-08-17 | End: 2019-08-17 | Stop reason: HOSPADM

## 2019-08-16 RX ORDER — ENALAPRILAT 1.25 MG/ML
1.25 INJECTION INTRAVENOUS EVERY 6 HOURS PRN
Status: DISCONTINUED | OUTPATIENT
Start: 2019-08-16 | End: 2019-08-17 | Stop reason: HOSPADM

## 2019-08-16 RX ADMIN — HYDROCORTISONE ACETATE 25 MG: 25 SUPPOSITORY RECTAL at 19:48

## 2019-08-16 ASSESSMENT — COPD QUESTIONNAIRES
IN THE PAST 12 MONTHS DO YOU DO LESS THAN YOU USED TO BECAUSE OF YOUR BREATHING PROBLEMS: DISAGREE/UNSURE
COPD SCREENING SCORE: 2
DURING THE PAST 4 WEEKS HOW MUCH DID YOU FEEL SHORT OF BREATH: NONE/LITTLE OF THE TIME
HAVE YOU SMOKED AT LEAST 100 CIGARETTES IN YOUR ENTIRE LIFE: NO/DON'T KNOW
DO YOU EVER COUGH UP ANY MUCUS OR PHLEGM?: NO/ONLY WITH OCCASIONAL COLDS OR INFECTIONS

## 2019-08-16 ASSESSMENT — ENCOUNTER SYMPTOMS
WEIGHT LOSS: 0
CONSTIPATION: 0
DIZZINESS: 0
FEVER: 0
DIARRHEA: 1
BLOOD IN STOOL: 1
CHILLS: 0
CLAUDICATION: 0
HEMOPTYSIS: 0
COUGH: 0
CONSTITUTIONAL NEGATIVE: 1
ORTHOPNEA: 0
NERVOUS/ANXIOUS: 0
VOMITING: 0
PALPITATIONS: 0
WHEEZING: 0
NAUSEA: 0
DEPRESSION: 0
SPUTUM PRODUCTION: 0
HEARTBURN: 1
NECK PAIN: 0
ABDOMINAL PAIN: 0
FOCAL WEAKNESS: 0
BLURRED VISION: 0
BACK PAIN: 0
SHORTNESS OF BREATH: 0
TINGLING: 0
HEADACHES: 0
DIARRHEA: 0
BRUISES/BLEEDS EASILY: 0
DOUBLE VISION: 0
PND: 0
MYALGIAS: 0

## 2019-08-16 ASSESSMENT — PATIENT HEALTH QUESTIONNAIRE - PHQ9
1. LITTLE INTEREST OR PLEASURE IN DOING THINGS: SEVERAL DAYS
9. THOUGHTS THAT YOU WOULD BE BETTER OFF DEAD, OR OF HURTING YOURSELF: NOT AT ALL
SUM OF ALL RESPONSES TO PHQ9 QUESTIONS 1 AND 2: 2
4. FEELING TIRED OR HAVING LITTLE ENERGY: NOT AT ALL
5. POOR APPETITE OR OVEREATING: NOT AT ALL
7. TROUBLE CONCENTRATING ON THINGS, SUCH AS READING THE NEWSPAPER OR WATCHING TELEVISION: NOT AT ALL
3. TROUBLE FALLING OR STAYING ASLEEP OR SLEEPING TOO MUCH: NOT AT ALL
SUM OF ALL RESPONSES TO PHQ QUESTIONS 1-9: 2
8. MOVING OR SPEAKING SO SLOWLY THAT OTHER PEOPLE COULD HAVE NOTICED. OR THE OPPOSITE, BEING SO FIGETY OR RESTLESS THAT YOU HAVE BEEN MOVING AROUND A LOT MORE THAN USUAL: NOT AT ALL
6. FEELING BAD ABOUT YOURSELF - OR THAT YOU ARE A FAILURE OR HAVE LET YOURSELF OR YOUR FAMILY DOWN: NOT AL ALL
2. FEELING DOWN, DEPRESSED, IRRITABLE, OR HOPELESS: SEVERAL DAYS

## 2019-08-16 ASSESSMENT — LIFESTYLE VARIABLES
TOTAL SCORE: 0
EVER_SMOKED: NEVER
ALCOHOL_USE: NO
HOW MANY TIMES IN THE PAST YEAR HAVE YOU HAD 5 OR MORE DRINKS IN A DAY: 0
HAVE PEOPLE ANNOYED YOU BY CRITICIZING YOUR DRINKING: NO
ON A TYPICAL DAY WHEN YOU DRINK ALCOHOL HOW MANY DRINKS DO YOU HAVE: 0
EVER FELT BAD OR GUILTY ABOUT YOUR DRINKING: NO
AVERAGE NUMBER OF DAYS PER WEEK YOU HAVE A DRINK CONTAINING ALCOHOL: 0
CONSUMPTION TOTAL: NEGATIVE
HAVE YOU EVER FELT YOU SHOULD CUT DOWN ON YOUR DRINKING: NO
TOTAL SCORE: 0
TOTAL SCORE: 0
EVER HAD A DRINK FIRST THING IN THE MORNING TO STEADY YOUR NERVES TO GET RID OF A HANGOVER: NO

## 2019-08-16 ASSESSMENT — COGNITIVE AND FUNCTIONAL STATUS - GENERAL
CLIMB 3 TO 5 STEPS WITH RAILING: A LITTLE
WALKING IN HOSPITAL ROOM: A LITTLE
MOVING FROM LYING ON BACK TO SITTING ON SIDE OF FLAT BED: A LITTLE
SUGGESTED CMS G CODE MODIFIER DAILY ACTIVITY: CH
DAILY ACTIVITIY SCORE: 24
STANDING UP FROM CHAIR USING ARMS: A LITTLE
SUGGESTED CMS G CODE MODIFIER MOBILITY: CJ
MOBILITY SCORE: 20

## 2019-08-16 NOTE — H&P
Hospital Medicine History & Physical Note    Date of Service  8/16/2019    Primary Care Physician  JAYE Coffey    Consultants  Gastroenterology    Code Status  DNR/DNI    Chief Complaint  Bright red blood per rectum this morning uncontrolled    History of Presenting Illness  This is a frail 88 y.o. female with a history of prior stroke, essential tremor, prior ovarian cancer 2007, essential hypertension overactive bladder who presented 8/16/2019 with maroon to bright red blood in her pad when she awoke this am and had more bleeding upon using the toilet.  She denies any abdominal pain. She is on plavix for what appears to be since 2017 for a stroke.  She states having a unremarkable colonoscopy ~8 months ago.  She does not recall ever hearing the mention of diverticulosis or hemorrhoids.  In her chart there is prior mention of hemorrhoids on her problem list.  Patient denies any recent straining or constipation.  Patient states she has been eating normally and has not had any major weight loss.    Review of Systems  Review of Systems   Constitutional: Negative.  Negative for malaise/fatigue and weight loss.   HENT: Negative for congestion.    Respiratory: Negative for shortness of breath.    Cardiovascular: Negative for chest pain, palpitations and leg swelling.   Gastrointestinal: Positive for blood in stool and diarrhea. Negative for abdominal pain, nausea and vomiting.   Genitourinary: Negative for dysuria and frequency.   Musculoskeletal: Negative for back pain and neck pain.   Neurological: Negative for dizziness, focal weakness and headaches.   Psychiatric/Behavioral: The patient is not nervous/anxious.        Past Medical History   has a past medical history of Arthritis, Cancer (CMS-HCC) (HCC) (2004), Dental disorder, Essential hypertension, benign, Generalized anxiety disorder, Heart murmur, Other specified disorder of intestines, Overactive bladder, Pain, Panic attacks, Reactive airway disease,  Symptomatic menopausal or female climacteric states, Unspecified cataract, Unspecified pleural effusion, and Unspecified urinary incontinence. She also has no past medical history of Hyperlipidemia.    Surgical History   has a past surgical history that includes colonoscopy (3/2010); angiogram (02/23/2007); thoracotomy (01/2008); colonoscopy (1999, 2005); abdominal hysterectomy total; oophorectomy (5/2008); lumbar laminectomy diskectomy (1/27/2015); and abdominal exploration.     Family History  family history is not on file.     Social History   reports that she has never smoked. She has never used smokeless tobacco. She reports that she does not drink alcohol or use drugs.    Allergies  Allergies   Allergen Reactions   • Metrogel      Headaches   • Tessalon [Benzonatate]      Headache     • Zoloft      headache       Medications  Prior to Admission Medications   Prescriptions Last Dose Informant Patient Reported? Taking?   PARoxetine (PAXIL) 20 MG Tab 8/16/2019 at AM Patient's Home Pharmacy No No   Sig: TAKE 1 TABLET BY MOUTH ONCE DAILY WITH FOOD.   acetaminophen (TYLENOL) 500 MG Tab 8/16/2019 at AM Patient's Home Pharmacy Yes Yes   Sig: Take 500 mg by mouth every 6 hours as needed.   alendronate (FOSAMAX) 70 MG Tab UNK at UNK Patient's Home Pharmacy No No   Sig: TAKE 1 TABLET BY MOUTH ONCE WEEKLY.   atorvastatin (LIPITOR) 40 MG Tab 8/15/2019 at PM Patient's Home Pharmacy No No   Sig: Take 1 Tab by mouth every evening.   clopidogrel (PLAVIX) 75 MG Tab 8/16/2019 at AM Patient's Home Pharmacy No No   Sig: Take 1 Tab by mouth every day.   metaxalone (SKELAXIN) 800 MG Tab PRN at PRN Patient's Home Pharmacy No No   Sig: TAKE ONE TABLET BY MOUTH 3 TIMES DAILY IF NEEDED FOR MUSCLE SPASMS.   oxybutynin (DITROPAN) 5 MG Tab PRN at PRN Patient's Home Pharmacy No No   Sig: TAKE 1 TABLET BY MOUTH 4 TIMES DAILY AS NEEDED.   potassium chloride SA (KDUR) 20 MEQ Tab CR 8/16/2019 at AM Patient's Home Pharmacy No No   Sig: Take 1  Tab by mouth every day.   primidone (MYSOLINE) 50 MG Tab 8/16/2019 at AM Patient's Home Pharmacy Yes Yes   Sig: Take 50 mg by mouth every day.   propranolol (INDERAL) 10 MG Tab 8/16/2019 at AM Patient's Home Pharmacy Yes Yes   Sig: Take 5 mg by mouth 2 times a day.   triamterene/hctz (MAXZIDE-25/DYAZIDE) 37.5-25 MG Cap 8/16/2019 at AM Patient's Home Pharmacy No No   Sig: TAKE 1 CAPSULE EVERY       MORNING      Facility-Administered Medications: None       Physical Exam  Temp:  [36.4 °C (97.6 °F)] 36.4 °C (97.6 °F)  Pulse:  [71-82] 71  Resp:  [2-20] 2  BP: (126-166)/(69-98) 126/70  SpO2:  [90 %-93 %] 92 %    Physical Exam   Constitutional: She is oriented to person, place, and time. She appears well-developed. No distress.   Frail   HENT:   Head: Normocephalic and atraumatic.   Nose: Nose normal.   Mouth/Throat: No oropharyngeal exudate.   Eyes: Conjunctivae and EOM are normal. Right eye exhibits no discharge. Left eye exhibits no discharge. No scleral icterus.   Neck: No tracheal deviation present.   Cardiovascular: Normal rate and regular rhythm.   Murmur heard.  Pulses:       Radial pulses are 2+ on the right side, and 2+ on the left side.        Dorsalis pedis pulses are 2+ on the right side, and 2+ on the left side.   Pulmonary/Chest: No stridor. No respiratory distress. She has decreased breath sounds (throughout lung field). She has no wheezes. She has no rhonchi.   Abdominal: Bowel sounds are normal. She exhibits no distension. There is no tenderness.   Musculoskeletal: She exhibits no edema.   Neurological: She is alert and oriented to person, place, and time. No cranial nerve deficit.   Skin: Skin is warm. She is not diaphoretic. There is pallor.   Thin skin.  Two lesion on bridge of nose that need further eval by dermatology outpatient (discussed with patient)   Psychiatric: She has a normal mood and affect.       Laboratory:  Recent Labs     08/16/19  1340   WBC 3.9*   RBC 3.59*   HEMOGLOBIN 12.2  "  HEMATOCRIT 36.2*   .8*   MCH 34.0*   MCHC 33.7   RDW 42.9   PLATELETCT 178   MPV 9.8     Recent Labs     08/16/19  1340   SODIUM 137   POTASSIUM 3.9   CHLORIDE 101   CO2 29   GLUCOSE 86   BUN 24*   CREATININE 0.73   CALCIUM 8.9     Recent Labs     08/16/19  1340   ALTSGPT 29   ASTSGOT 37   ALKPHOSPHAT 64   TBILIRUBIN 0.7   LIPASE 25   GLUCOSE 86     Recent Labs     08/16/19  1340   APTT 28.1   INR 1.04     No results for input(s): NTPROBNP in the last 72 hours.      No results for input(s): TROPONINT in the last 72 hours.    Urinalysis:    No results found     Imaging:  DX-CHEST-PORTABLE (1 VIEW)   Final Result      Stable appearance of the chest with probable scarring in the right lung. No new abnormality.            Assessment/Plan:  I anticipate this patient will require at least two midnights for appropriate medical management, necessitating inpatient admission.    Personal history of ovarian cancer- (present on admission)  Assessment & Plan  Previously followed by Dr. Vee  Patient reports ovarian cancer diagnosed 2008    History of stroke  Assessment & Plan  2017  Hold Plavix      Hematochezia  Assessment & Plan  Dr Mabry, GI was consulted by emergency room physician.  Patient recently had colonoscopy 8 months ago per her report which was unremarkable with no polyps.  Patient denies any recent constipation states she has had some loose \"applesauce\" like bowel movements recently normal brown  Episode of diarrhea this morning bright red uncontrolled.  No abdominal pain  Follow hemoglobin and hematocrit and longer vitals  Question of diverticulosis versus internal hemorrhoids  Start Anusol  Hold PPI  Hold Plavix for now    Foot drop, bilateral- (present on admission)  Assessment & Plan  Will have bedside RN evaluate for any risk of falling    DNR (do not resuscitate)- (present on admission)  Assessment & Plan  Confirmed 8/16 in ER w/ patient.  DNR/DNI    Overactive bladder- (present on " admission)  Assessment & Plan  Continue ditropan  Normally wears a pad    Essential hypertension, benign- (present on admission)  Assessment & Plan  Continue triamterene/HCTZ  Patient states she does not take propranolol any longer for her essential tremor or blood pressure.  Monitor vitals      VTE prophylaxis: SCDs

## 2019-08-16 NOTE — CONSULTS
Date of service: 8/16/2019    Attending Physician: No att. providers found    History of Present Illness: Marifer Eldridge is a 88 y.o. female here for GI bleed.  Her PMH is as follows:  -Metastatic ovarian cancer in 2008 s/p resection- stable  -Stroke- lacunar infarct in 2017: no residual deficits; On Plavix and Lipitor  -HTN- On Triamterene/HCTZ and Propranolol  -Hemorrhoids- takes rectal hydrocortisone  -Back pain, chronic- takes 1 Advil a day, Metaxalone 800 mg TID  -Osteoporosis- on Alendronate    Patient woke up this morning at 5:30am and went to toilet and found her pad soaked with blood, and bright red blood filling toilet bowl. She denies light headedness or dizziness, but did have a generalized mild headache.   She went to her doctor who did a rectal exam and found maroon blood, guaiac positive.    Patient takes Plavix and 1 Advil a day. She drinks 1 cup of coffee a day, but denies alcohol, soda use. Her last Colonoscopy was in feb 2019 and was normal. Her last EGD was 2 years ago for dysphagia- and was normal; dysphagia resolved on its own  Patient had a similar episode of bleeding per rectum 2-3 years ago but did not see a doctor about it then  She occasionally gets hemorrhoid bleed but she says this was different from the dark red blood mixed with stool of hemorrhoids.    She denies abdominal pain, nausea, vomiting, diarrhea or constipation. She gets heartburn from Alendronate.    Ed- Hb was 12.2 Hct 36.2, /98, HR 75. Patient had another episode of maroon bloody bowel movement in the ED      Review of Systems:   Review of Systems   Constitutional: Negative for chills, fever and weight loss.   HENT: Negative for ear pain, hearing loss and tinnitus.    Eyes: Negative for blurred vision and double vision.   Respiratory: Negative for cough, hemoptysis, sputum production, shortness of breath and wheezing.    Cardiovascular: Negative for chest pain, palpitations, orthopnea, claudication, leg swelling and  PND.   Gastrointestinal: Positive for blood in stool and heartburn. Negative for abdominal pain, constipation, diarrhea, melena, nausea and vomiting.   Genitourinary: Negative for dysuria and urgency.   Musculoskeletal: Negative for myalgias and neck pain.   Skin: Negative for itching and rash.   Neurological: Negative for dizziness, tingling and headaches.   Endo/Heme/Allergies: Does not bruise/bleed easily.   Psychiatric/Behavioral: Negative for depression and suicidal ideas.       No current facility-administered medications for this encounter.      Current Outpatient Medications   Medication Sig Dispense Refill   • propranolol (INDERAL) 10 MG Tab Take 5 mg by mouth 2 times a day.     • primidone (MYSOLINE) 50 MG Tab Take 50 mg by mouth every day.     • acetaminophen (TYLENOL) 500 MG Tab Take 500 mg by mouth every 6 hours as needed.     • metaxalone (SKELAXIN) 800 MG Tab TAKE ONE TABLET BY MOUTH 3 TIMES DAILY IF NEEDED FOR MUSCLE SPASMS. 90 Tab 0   • alendronate (FOSAMAX) 70 MG Tab TAKE 1 TABLET BY MOUTH ONCE WEEKLY. 12 Tab 0   • PARoxetine (PAXIL) 20 MG Tab TAKE 1 TABLET BY MOUTH ONCE DAILY WITH FOOD. 90 Tab 0   • oxybutynin (DITROPAN) 5 MG Tab TAKE 1 TABLET BY MOUTH 4 TIMES DAILY AS NEEDED. 120 Tab 3   • triamterene/hctz (MAXZIDE-25/DYAZIDE) 37.5-25 MG Cap TAKE 1 CAPSULE EVERY       MORNING 90 Cap 0   • atorvastatin (LIPITOR) 40 MG Tab Take 1 Tab by mouth every evening. 30 Tab 0   • clopidogrel (PLAVIX) 75 MG Tab Take 1 Tab by mouth every day. 30 Tab 0   • potassium chloride SA (KDUR) 20 MEQ Tab CR Take 1 Tab by mouth every day. 30 Tab 2       Social History     Tobacco Use   • Smoking status: Never Smoker   • Smokeless tobacco: Never Used   Substance Use Topics   • Alcohol use: No   • Drug use: No        Past Medical History:   Diagnosis Date   • Arthritis     back   • Cancer (CMS-HCC) (HCC) 2004    ovarian   • Dental disorder     upper partial   • Essential hypertension, benign    • Generalized anxiety  "disorder    • Heart murmur     systolic   • Other specified disorder of intestines     diarrhea from some foods   • Overactive bladder    • Pain     low back pain   • Panic attacks    • Reactive airway disease     uses no inhalers   • Symptomatic menopausal or female climacteric states    • Unspecified cataract     bilateral IOL   • Unspecified pleural effusion    • Unspecified urinary incontinence        Past Surgical History:   Procedure Laterality Date   • LUMBAR LAMINECTOMY DISKECTOMY  1/27/2015    Performed by Maxwell Lei M.D. at SURGERY [facility]   • COLONOSCOPY  3/2010    Normal    • OOPHORECTOMY  5/2008       Dr Dee   • THORACOTOMY  01/2008    pleurodesis   • ANGIOGRAM  02/23/2007    cardiac  wnl   • ABDOMINAL EXPLORATION     • ABDOMINAL HYSTERECTOMY TOTAL      still has 1 ovary   • COLONOSCOPY  1999, 2005    both ok        Allergies: Metrogel; Tessalon [benzonatate]; and Zoloft    No family history on file.    Vitals:    08/16/19 1327 08/16/19 1328 08/16/19 1454 08/16/19 1500   Height: 1.575 m (5' 2\")      Weight: 54.4 kg (120 lb)      Weight % change since last entry.: 0 %      BP:  (!) 164/98 (!) 165/69 (!) 166/79   Pulse:  75 82 74   BMI (Calculated): 21.95      Resp:  20 (!) 11 (!) 11   Temp:  36.4 °C (97.6 °F)     TempSrc:  Temporal      08/16/19 1530   Height:    Weight:    Weight % change since last entry.:    BP: 126/70   Pulse: 71   BMI (Calculated):    Resp: (!) 2   Temp:    TempSrc:        Physical Examination:    Physical Exam   Constitutional: She is oriented to person, place, and time and well-developed, well-nourished, and in no distress. No distress.   HENT:   Head: Normocephalic and atraumatic.   Eyes: Pupils are equal, round, and reactive to light. Conjunctivae and EOM are normal. No scleral icterus.   Neck: Normal range of motion. Neck supple. No tracheal deviation present. No thyromegaly present.   Cardiovascular: Normal rate, regular rhythm and intact distal pulses. "   Pansystolic murmur heard throughout, more in aortic area   Pulmonary/Chest: Effort normal and breath sounds normal.   Abdominal: Soft. Bowel sounds are normal. She exhibits no distension. There is no tenderness. There is no rebound and no guarding.   Musculoskeletal: Normal range of motion. She exhibits no edema.   Neurological: She is alert and oriented to person, place, and time.   Skin: Skin is warm and dry. She is not diaphoretic. No erythema.   Psychiatric: Mood and affect normal.   Nursing note and vitals reviewed.        Lab Results   Component Value Date/Time    PROTHROMBTM 13.9 08/16/2019 01:40 PM    INR 1.04 08/16/2019 01:40 PM      Lab Results   Component Value Date/Time    WBC 3.9 (L) 08/16/2019 01:40 PM    RBC 3.59 (L) 08/16/2019 01:40 PM    HEMOGLOBIN 12.2 08/16/2019 01:40 PM    HEMATOCRIT 36.2 (L) 08/16/2019 01:40 PM    .8 (H) 08/16/2019 01:40 PM    MCH 34.0 (H) 08/16/2019 01:40 PM    MCHC 33.7 08/16/2019 01:40 PM    MPV 9.8 08/16/2019 01:40 PM    NEUTSPOLYS 59.70 08/16/2019 01:40 PM    LYMPHOCYTES 27.20 08/16/2019 01:40 PM    MONOCYTES 9.50 08/16/2019 01:40 PM    EOSINOPHILS 2.80 08/16/2019 01:40 PM    EOSINOPHILS 3 01/10/2008 01:00 PM    BASOPHILS 0.50 08/16/2019 01:40 PM    HYPOCHROMIA 2+ 04/30/2008 12:30 PM      Lab Results   Component Value Date/Time    SODIUM 137 08/16/2019 01:40 PM    POTASSIUM 3.9 08/16/2019 01:40 PM    CHLORIDE 101 08/16/2019 01:40 PM    CO2 29 08/16/2019 01:40 PM    GLUCOSE 86 08/16/2019 01:40 PM    BUN 24 (H) 08/16/2019 01:40 PM    CREATININE 0.73 08/16/2019 01:40 PM    CREATININE 1.0 04/30/2008 12:30 PM      Recent Labs     08/16/19  1340   ASTSGOT 37   ALTSGPT 29   TBILIRUBIN 0.7   ALKPHOSPHAT 64   GLOBULIN 2.5   INR 1.04       Imaging:   DX-CHEST-PORTABLE (1 VIEW)   Final Result      Stable appearance of the chest with probable scarring in the right lung. No new abnormality.              Assessment and Plan:    Lower GI bleed- likely from  hemorrhoids?diverticulosis  -Recent Colonoscopy was normal  -Vitals and Hb are stable    -Will monitor for now, if patient deteriorates we will prep her for Colonoscopy

## 2019-08-16 NOTE — ED TRIAGE NOTES
Pt arrived via EMS from [facility]. Pt states she has chronic diarrhea, but noted light colored blood in her stool this am. Pt denies abd pain, n/v. Pt denies hx of GI bleed. Pt states she takes a blood thinner, unsure of why.     Pt is alert and oriented x3. Respirations even and unlabored.

## 2019-08-16 NOTE — ED PROVIDER NOTES
ED Provider Note    Scribed for Michael Skinner M.D. by David Strickland. 8/16/2019  1:51 PM    Primary care provider: Angelito Thomas M.D.  Means of arrival: Ambulance  History obtained from: Patient  History limited by: None    CHIEF COMPLAINT  Chief Complaint   Patient presents with   • Lower GI Bleed     HPI  Marifer Eldridge is a 88 y.o. female who presents to the Emergency Department as a transfer from an outside hospital for evaluation of sudden bloody stool which began today. She states she had an episode of light red colored stool which she noticed this morning prompting her visit to the ED. The patient reports associated chronic diarrhea. She currently takes Plavax for an unknown chronic condition and she had a colonoscopy performed earlier this year with Dr. Le. Patient denies abdominal pain, fevers, chills, nausea, and vomiting. She denies any history of GI bleed.    REVIEW OF SYSTEMS  Pertinent positives include diarrhea, blood in stool.   Pertinent negatives include no abdominal pain, fevers, chills, nausea, vomiting.    All other systems reviewed and negative. See HPI for further details.     PAST MEDICAL HISTORY   has a past medical history of Arthritis, Cancer (CMS-HCC) (HCC) (2004), Dental disorder, Essential hypertension, benign, Generalized anxiety disorder, Heart murmur, Other specified disorder of intestines, Overactive bladder, Pain, Panic attacks, Reactive airway disease, Symptomatic menopausal or female climacteric states, Unspecified cataract, Unspecified pleural effusion, and Unspecified urinary incontinence.    SURGICAL HISTORY   has a past surgical history that includes colonoscopy (3/2010); angiogram (02/23/2007); thoracotomy (01/2008); colonoscopy (1999, 2005); abdominal hysterectomy total; oophorectomy (5/2008); lumbar laminectomy diskectomy (1/27/2015); and abdominal exploration.    SOCIAL HISTORY  Social History     Tobacco Use   • Smoking status: Never Smoker   • Smokeless  tobacco: Never Used   Substance Use Topics   • Alcohol use: No   • Drug use: No      Social History     Substance and Sexual Activity   Drug Use No     FAMILY HISTORY  None noted.    CURRENT MEDICATIONS  Current Outpatient Medications:   •  metaxalone (SKELAXIN) 800 MG Tab, TAKE ONE TABLET BY MOUTH 3 TIMES DAILY IF NEEDED FOR MUSCLE SPASMS., Disp: 90 Tab, Rfl: 0  •  alendronate (FOSAMAX) 70 MG Tab, TAKE 1 TABLET BY MOUTH ONCE WEEKLY., Disp: 12 Tab, Rfl: 0  •  PARoxetine (PAXIL) 20 MG Tab, TAKE 1 TABLET BY MOUTH ONCE DAILY WITH FOOD., Disp: 90 Tab, Rfl: 0  •  hydrocortisone rectal (PROCTOSOL HC) 2.5 % Cream, APPLY TO ANAL AREA 2 TIMES A DAY., Disp: 28.35 g, Rfl: 1  •  oxybutynin (DITROPAN) 5 MG Tab, TAKE 1 TABLET BY MOUTH 4 TIMES DAILY AS NEEDED., Disp: 120 Tab, Rfl: 3  •  triamterene/hctz (MAXZIDE-25/DYAZIDE) 37.5-25 MG Cap, TAKE 1 CAPSULE EVERY       MORNING, Disp: 90 Cap, Rfl: 0  •  B Complex-C (SUPER B COMPLEX/VITAMIN C) Tab, Take  by mouth., Disp: , Rfl:   •  propranolol (INDERAL) 20 MG Tab, TAKE (1) TABLET BY MOUTH TWICE DAILY., Disp: 180 Tab, Rfl: 3  •  Omega-3 Fatty Acids (FISH OIL) 1000 MG Cap capsule, Take 1 Cap by mouth every day., Disp: 30 Cap, Rfl: 0  •  atorvastatin (LIPITOR) 40 MG Tab, Take 1 Tab by mouth every evening., Disp: 30 Tab, Rfl: 0  •  clopidogrel (PLAVIX) 75 MG Tab, Take 1 Tab by mouth every day., Disp: 30 Tab, Rfl: 0  •  magnesium oxide (MAG-OX) 400 (241.3 Mg) MG Tab tablet, Take 1 Tab by mouth every day., Disp: 30 Tab, Rfl: 0  •  potassium chloride SA (KDUR) 20 MEQ Tab CR, Take 1 Tab by mouth every day., Disp: 30 Tab, Rfl: 2  •  oxybutynin (DITROPAN) 5 MG Tab, Take 5 mg by mouth 4 times a day as needed., Disp: , Rfl:   •  diphenoxylate-atropine (LOMOTIL) 2.5-0.025 MG Tab, TAKE  (1)  TABLET  FOUR TIMES DAILY AS NEEDED FOR DIARRHEA., Disp: 90 Tab, Rfl: 1  •  latanoprost (XALATAN) 0.005 % SOLN, Place 1 Drop in right eye every evening., Disp: , Rfl:   •  vitamin D (CHOLECALCIFEROL) 1000 UNIT  "TABS, Take 1 Tab by mouth every day., Disp: 30 Tab, Rfl: 3    ALLERGIES  Allergies   Allergen Reactions   • Metrogel      Headaches   • Tessalon [Benzonatate]      Headache     • Zoloft      headache     PHYSICAL EXAM  VITAL SIGNS: BP (!) 164/98   Pulse 75   Temp 36.4 °C (97.6 °F) (Temporal)   Resp 20   Ht 1.575 m (5' 2\")   Wt 54.4 kg (120 lb)   LMP 03/20/2008   BMI 21.95 kg/m²     Nursing note and vitals reviewed.  Constitutional: Well-developed and well-nourished. No distress.   HENT: Head is normocephalic and atraumatic. Oropharynx is clear and moist without exudate or erythema.   Eyes: Pupils are equal, round, and reactive to light. Conjunctiva are normal.   Cardiovascular: Normal rate and regular rhythm. No murmur heard. Normal radial pulses.  Pulmonary/Chest: Breath sounds normal. No wheezes or rales.   Abdominal: Soft, non-tender and non-distended. Normal active bowel sounds. No guarding or peritoneal signs. No palpable abdominal aortic aneurysm. No masses. No tenderness at McBurney's point.   Musculoskeletal: Extremities exhibit normal range of motion without edema or tenderness.   Neurological: Awake, alert and oriented to person, place, and time. No focal deficits noted.  Skin: Skin is warm and dry. No rash.  Psychiatric: Normal mood and affect. Appropriate for clinical situation    DIAGNOSTIC STUDIES / PROCEDURES    LABS  Results for orders placed or performed during the hospital encounter of 08/16/19   CBC WITH DIFFERENTIAL   Result Value Ref Range    WBC 3.9 (L) 4.8 - 10.8 K/uL    RBC 3.59 (L) 4.20 - 5.40 M/uL    Hemoglobin 12.2 12.0 - 16.0 g/dL    Hematocrit 36.2 (L) 37.0 - 47.0 %    .8 (H) 81.4 - 97.8 fL    MCH 34.0 (H) 27.0 - 33.0 pg    MCHC 33.7 33.6 - 35.0 g/dL    RDW 42.9 35.9 - 50.0 fL    Platelet Count 178 164 - 446 K/uL    MPV 9.8 9.0 - 12.9 fL    Neutrophils-Polys 59.70 44.00 - 72.00 %    Lymphocytes 27.20 22.00 - 41.00 %    Monocytes 9.50 0.00 - 13.40 %    Eosinophils 2.80 0.00 " - 6.90 %    Basophils 0.50 0.00 - 1.80 %    Immature Granulocytes 0.30 0.00 - 0.90 %    Nucleated RBC 0.00 /100 WBC    Neutrophils (Absolute) 2.33 2.00 - 7.15 K/uL    Lymphs (Absolute) 1.06 1.00 - 4.80 K/uL    Monos (Absolute) 0.37 0.00 - 0.85 K/uL    Eos (Absolute) 0.11 0.00 - 0.51 K/uL    Baso (Absolute) 0.02 0.00 - 0.12 K/uL    Immature Granulocytes (abs) 0.01 0.00 - 0.11 K/uL    NRBC (Absolute) 0.00 K/uL   COMP METABOLIC PANEL   Result Value Ref Range    Sodium 137 135 - 145 mmol/L    Potassium 3.9 3.6 - 5.5 mmol/L    Chloride 101 96 - 112 mmol/L    Co2 29 20 - 33 mmol/L    Anion Gap 7.0 0.0 - 11.9    Glucose 86 65 - 99 mg/dL    Bun 24 (H) 8 - 22 mg/dL    Creatinine 0.73 0.50 - 1.40 mg/dL    Calcium 8.9 8.5 - 10.5 mg/dL    AST(SGOT) 37 12 - 45 U/L    ALT(SGPT) 29 2 - 50 U/L    Alkaline Phosphatase 64 30 - 99 U/L    Total Bilirubin 0.7 0.1 - 1.5 mg/dL    Albumin 3.7 3.2 - 4.9 g/dL    Total Protein 6.2 6.0 - 8.2 g/dL    Globulin 2.5 1.9 - 3.5 g/dL    A-G Ratio 1.5 g/dL   LIPASE   Result Value Ref Range    Lipase 25 11 - 82 U/L   PROTHROMBIN TIME   Result Value Ref Range    PT 13.9 12.0 - 14.6 sec    INR 1.04 0.87 - 1.13   APTT   Result Value Ref Range    APTT 28.1 24.7 - 36.0 sec   ESTIMATED GFR   Result Value Ref Range    GFR If African American >60 >60 mL/min/1.73 m 2    GFR If Non African American >60 >60 mL/min/1.73 m 2   All labs reviewed by me.    RADIOLOGY  DX-CHEST-PORTABLE (1 VIEW)   Final Result      Stable appearance of the chest with probable scarring in the right lung. No new abnormality.      The radiologist's interpretation of all radiological studies have been reviewed by me.    COURSE & MEDICAL DECISION MAKING  Nursing notes, VS, PMSFHx reviewed in chart.     Review of past medical records from transferring facility shows the patient has a history of chronic diarrhea. She takes Plavax, and was seen at outside hospital today and as found to have maroon colored stool hemoccult positive with a  hemoglobin of 12.9.    1:45 PM - Called outside facility for further information on diagnostic studies performed.    1:51 PM - Patient seen and examined at bedside. Ordered COD, CBC with Diff, CMP, Lipase, Prothrombin, APTT, DX-Chest to evaluate her symptoms. The differential diagnoses include but are not limited to: Hemorrhoid, lower GI bleed    2:09 PM - Paged Dr. Fraire.    2:12 PM - Paged GI.    2:14 PM  - I discussed the patient's case and the above findings with Dr. Fraire (hospitalist) who agrees to admit patient    2:33 PM - I discussed the patient's case and the above findings with Dr. Mabry (GI) who will consult    Patient presents today with a GI bleed.  She is on antiplatelet therapy.  Repeat hemoglobin here is stable.  She will be admitted for further evaluation.    DISPOSITION:  Patient will be admitted to hospital in guarded condition.    FINAL IMPRESSION  1. Lower GI bleed    2. Antiplatelet or antithrombotic long-term use          I, David Strickland (Malena), am scribing for, and in the presence of, Michael Skinner M.D..    Electronically signed by: David Strickland (Malena), 8/16/2019    IMichael M.D. personally performed the services described in this documentation, as scribed by David Strickland in my presence, and it is both accurate and complete.    C    The note accurately reflects work and decisions made by me.  Michael Skinner  8/16/2019  3:02 PM

## 2019-08-17 ENCOUNTER — PATIENT OUTREACH (OUTPATIENT)
Dept: HEALTH INFORMATION MANAGEMENT | Facility: OTHER | Age: 84
End: 2019-08-17

## 2019-08-17 VITALS
DIASTOLIC BLOOD PRESSURE: 73 MMHG | HEART RATE: 70 BPM | RESPIRATION RATE: 18 BRPM | SYSTOLIC BLOOD PRESSURE: 166 MMHG | OXYGEN SATURATION: 94 % | WEIGHT: 120 LBS | TEMPERATURE: 97.5 F | HEIGHT: 62 IN | BODY MASS INDEX: 22.08 KG/M2

## 2019-08-17 LAB
ANION GAP SERPL CALC-SCNC: 7 MMOL/L (ref 0–11.9)
BUN SERPL-MCNC: 17 MG/DL (ref 8–22)
CALCIUM SERPL-MCNC: 8.8 MG/DL (ref 8.5–10.5)
CHLORIDE SERPL-SCNC: 102 MMOL/L (ref 96–112)
CO2 SERPL-SCNC: 27 MMOL/L (ref 20–33)
CREAT SERPL-MCNC: 0.65 MG/DL (ref 0.5–1.4)
ERYTHROCYTE [DISTWIDTH] IN BLOOD BY AUTOMATED COUNT: 44.7 FL (ref 35.9–50)
GLUCOSE SERPL-MCNC: 92 MG/DL (ref 65–99)
HCT VFR BLD AUTO: 38 % (ref 37–47)
HGB BLD-MCNC: 12.6 G/DL (ref 12–16)
HGB BLD-MCNC: 14.3 G/DL (ref 12–16)
MCH RBC QN AUTO: 34.1 PG (ref 27–33)
MCHC RBC AUTO-ENTMCNC: 33.2 G/DL (ref 33.6–35)
MCV RBC AUTO: 102.7 FL (ref 81.4–97.8)
PLATELET # BLD AUTO: 165 K/UL (ref 164–446)
PMV BLD AUTO: 9.5 FL (ref 9–12.9)
POTASSIUM SERPL-SCNC: 3.7 MMOL/L (ref 3.6–5.5)
RBC # BLD AUTO: 3.7 M/UL (ref 4.2–5.4)
SODIUM SERPL-SCNC: 136 MMOL/L (ref 135–145)
WBC # BLD AUTO: 3.7 K/UL (ref 4.8–10.8)

## 2019-08-17 PROCEDURE — 85027 COMPLETE CBC AUTOMATED: CPT

## 2019-08-17 PROCEDURE — 36415 COLL VENOUS BLD VENIPUNCTURE: CPT

## 2019-08-17 PROCEDURE — A9270 NON-COVERED ITEM OR SERVICE: HCPCS | Performed by: HOSPITALIST

## 2019-08-17 PROCEDURE — 99239 HOSP IP/OBS DSCHRG MGMT >30: CPT | Performed by: HOSPITALIST

## 2019-08-17 PROCEDURE — 80048 BASIC METABOLIC PNL TOTAL CA: CPT

## 2019-08-17 PROCEDURE — 85018 HEMOGLOBIN: CPT

## 2019-08-17 PROCEDURE — 700102 HCHG RX REV CODE 250 W/ 637 OVERRIDE(OP): Performed by: HOSPITALIST

## 2019-08-17 RX ORDER — PRIMIDONE 50 MG/1
50 TABLET ORAL DAILY
Status: DISCONTINUED | OUTPATIENT
Start: 2019-08-17 | End: 2019-08-17

## 2019-08-17 RX ORDER — CLOPIDOGREL BISULFATE 75 MG/1
75 TABLET ORAL DAILY
Qty: 30 TAB | Refills: 0
Start: 2019-08-21

## 2019-08-17 RX ORDER — PROPRANOLOL HYDROCHLORIDE 10 MG/1
5 TABLET ORAL TWICE DAILY
Status: DISCONTINUED | OUTPATIENT
Start: 2019-08-17 | End: 2019-08-17

## 2019-08-17 RX ADMIN — HYDROCORTISONE ACETATE 25 MG: 25 SUPPOSITORY RECTAL at 05:13

## 2019-08-17 RX ADMIN — OXYBUTYNIN CHLORIDE 5 MG: 5 TABLET ORAL at 05:08

## 2019-08-17 RX ADMIN — TRIAMTERENE AND HYDROCHLOROTHIAZIDE 1 CAPSULE: 25; 37.5 CAPSULE ORAL at 05:09

## 2019-08-17 RX ADMIN — PAROXETINE HYDROCHLORIDE 20 MG: 10 TABLET, FILM COATED ORAL at 05:03

## 2019-08-17 ASSESSMENT — ENCOUNTER SYMPTOMS: CONSTITUTIONAL NEGATIVE: 1

## 2019-08-17 NOTE — PROGRESS NOTES
· 2 RN skin check complete with JOHNNY Topete  · Devices in place: glasses  · Skin assessed under devices: yes - some abrasions to nose   · Confirmed pressure ulcers found: N/A  · New potential pressure ulcers found on: N/A  Wound consult placed? N/A Photo uploaded? N/A  · The following interventions are in place: pt reminded to turn self and notify of any new bruises/wounds.

## 2019-08-17 NOTE — PROGRESS NOTES
Report received JOHNNY Jefferson. Assumed care at 0700, assessment complete. Pt is A & O x 4.  Pt denies having any pain at this time. Fall precautions and appropriate signs in place. Pt oriented to unit routine, call light/phone system and RN extension number provided. Pt educated regarding fall precautions. Bed alarm in use. Pt denies any additional needs at this time. Call light within reach.

## 2019-08-17 NOTE — CARE PLAN
Problem: Safety  Goal: Will remain free from falls  Outcome: PROGRESSING AS EXPECTED   Fall precautions in place, call light within reach, frequent rounding initiated

## 2019-08-17 NOTE — DISCHARGE INSTRUCTIONS
Hold Plavix x 5 days.  [facility] in [facility].  Return to ER if recurrent bleeding, weakness shortness of breath,  fatigue, not feeling well.   Discharge Instructions    Discharged to home by car with relative. Discharged via wheelchair, hospital escort: Yes.  Special equipment needed: Not Applicable    Be sure to schedule a follow-up appointment with your primary care doctor or any specialists as instructed.     Discharge Plan:   Diet Plan: Discussed  Activity Level: Discussed  Confirmed Follow up Appointment: Appointment Scheduled  Confirmed Symptoms Management: Discussed  Medication Reconciliation Updated: Yes  Influenza Vaccine Indication: Not indicated: Previously immunized this influenza season and > 8 years of age    I understand that a diet low in cholesterol, fat, and sodium is recommended for good health. Unless I have been given specific instructions below for another diet, I accept this instruction as my diet prescription.   Other diet: Regular    Special Instructions: None    · Is patient discharged on Warfarin / Coumadin?   No     Depression / Suicide Risk    As you are discharged from this [facility] facility, it is important to learn how to keep safe from harming yourself.    Recognize the warning signs:  · Abrupt changes in personality, positive or negative- including increase in energy   · Giving away possessions  · Change in eating patterns- significant weight changes-  positive or negative  · Change in sleeping patterns- unable to sleep or sleeping all the time   · Unwillingness or inability to communicate  · Depression  · Unusual sadness, discouragement and loneliness  · Talk of wanting to die  · Neglect of personal appearance   · Rebelliousness- reckless behavior  · Withdrawal from people/activities they love  · Confusion- inability to concentrate     If you or a loved one observes any of these behaviors or has concerns about self-harm, here's what you can  do:  · Talk about it- your feelings and reasons for harming yourself  · Remove any means that you might use to hurt yourself (examples: pills, rope, extension cords, firearm)  · Get professional help from the community (Mental Health, Substance Abuse, psychological counseling)  · Do not be alone:Call your Safe Contact- someone whom you trust who will be there for you.  · Call your local CRISIS HOTLINE 805-7420 or 940-354-6532  · Call your local Children's Mobile Crisis Response Team [place] (504) 738-5266 or www.Everyclick  · Call the toll free National Suicide Prevention Hotlines   · National Suicide Prevention Lifeline 777-013-FENX (6360)  · National Hope Line Network 800-SUICIDE (017-8433)

## 2019-08-17 NOTE — CARE PLAN
Problem: Safety  Goal: Will remain free from falls  Note:   Pt will remain free from falls during stay, pt was educated on call light use and calling for help when needed. Pt bed alarm will remain on as well.     Problem: Bowel/Gastric:  Goal: Normal bowel function is maintained or improved  Note:   Will bowel function will improve, pt stool will be monitored for sandor red blood or any s/s of GI bleed. Pt will also update with any changes.

## 2019-08-17 NOTE — DISCHARGE SUMMARY
Hospital Medicine Discharge Note     Admit Date:  8/16/2019       Discharge Date:   8/17/2019    Attending Physician:  Jimmy Guthrie M.D.      Diagnoses (includes active and resolved):     Active Problems:    Hematochezia    Personal history of ovarian cancer POA: Yes    Essential hypertension, benign POA: Yes    Overactive bladder POA: Yes    DNR (do not resuscitate) POA: Yes    Foot drop, bilateral POA: Yes    History of stroke POA: Unknown  Resolved Problems:  Hematochezia    Hospital Summary (Brief Narrative):         Patient is a pleasant 80-year-old female history of stroke on Plavix, essential tremor, ovarian cancer 2007, hypertension, overactive bladder admitted with symptoms of maroon to bright red bloody stools.  Patient had no abdominal pain.  No nausea vomiting.  She had colonoscopy earlier this year and reportedly unremarkable.  Hemoglobin 12.2 on admission.  Coagulation studies normal.  She reported no routine use of aspirin or NSAIDs.  Following admission  Hemoglobin was stable 12 range and increased to 14 prior to discharge.  She had a large bowel movement dark greenish in color.  She had no further symptoms of GI bleed.  Dr. Darryn Mabry, GI was consulted given her resolution of symptoms and stable hemoglobin and recent unremarkable colonoscopy is recommended she could follow-up outpatient in [facility].  Patient Plavix will be held for 5 days.  Patient tolerated diet.  Abdominal exam benign.  Hemodynamically stable.  She has chronic debility and uses a walker.  Function at baseline.  Her brother with help in her care.  On day of discharge I examined patient, discussed findings, plan of care and follow-up.  She was discharged home in stable condition.    Consultants:        Dr. Darryn Mabry, GI    Imaging/ Testing:      DX-CHEST-PORTABLE (1 VIEW)   Final Result      Stable appearance of the chest with probable scarring in the right lung. No new abnormality.             Procedures:          none    Discharge Medications:             Medication List      CHANGE how you take these medications      Instructions   clopidogrel 75 MG Tabs  Start taking on:  8/21/2019  What changed:  These instructions start on 8/21/2019. If you are unsure what to do until then, ask your doctor or other care provider.  Commonly known as:  PLAVIX   Take 1 Tab by mouth every day.  Dose:  75 mg        CONTINUE taking these medications      Instructions   acetaminophen 500 MG Tabs  Commonly known as:  TYLENOL   Take 500 mg by mouth every 6 hours as needed.  Dose:  500 mg     alendronate 70 MG Tabs  Commonly known as:  FOSAMAX   Doctor's comments:  Last refill--patient must be seen Angelito Thomas M.D.  TAKE 1 TABLET BY MOUTH ONCE WEEKLY.     atorvastatin 40 MG Tabs  Commonly known as:  LIPITOR   Take 1 Tab by mouth every evening.  Dose:  40 mg     metaxalone 800 MG Tabs  Commonly known as:  SKELAXIN   Doctor's comments:  Last refill--patient must be seen Angelito Thomas M.D.  TAKE ONE TABLET BY MOUTH 3 TIMES DAILY IF NEEDED FOR MUSCLE SPASMS.     oxybutynin 5 MG Tabs  Commonly known as:  DITROPAN   TAKE 1 TABLET BY MOUTH 4 TIMES DAILY AS NEEDED.     PARoxetine 20 MG Tabs  Commonly known as:  PAXIL   Doctor's comments:  Patient to be seen.  Angelito Thomas MD  TAKE 1 TABLET BY MOUTH ONCE DAILY WITH FOOD.     potassium chloride SA 20 MEQ Tbcr  Commonly known as:  Kdur   Take 1 Tab by mouth every day.  Dose:  20 mEq     primidone 50 MG Tabs  Commonly known as:  MYSOLINE   Take 50 mg by mouth every day.  Dose:  50 mg     propranolol 10 MG Tabs  Commonly known as:  INDERAL   Take 5 mg by mouth 2 times a day.  Dose:  5 mg     triamterene/hctz 37.5-25 MG Caps  Commonly known as:  MAXZIDE-25/DYAZIDE   TAKE 1 CAPSULE EVERY       MORNING               Diet:          Cardiac as tolerated         Activity:   As tolerated.      Code status:   Full code    Primary Care Provider:    Alexandra SEGURA  JAYE Fofana    Follow up appointment details :      .  JAYE Coffey  [address]  [place] [address]  [phone]    In 1 week      Darryn Mabry M.D.  [address] [place] [address]  [phone]    In 4 weeks            Time spent on discharge day patient visit: 40 minutes    #################################################

## 2019-08-17 NOTE — PROGRESS NOTES
Report received JOHNNY Ramirez. Assumed care at 1900, assessment complete. Pt is A & O x 4.  Pt denies having any pain at this time. New admit, 2 RN skin check done. Fall precautions and appropriate signs in place. Pt oriented to unit routine, call light/phone system and RN extension number provided. Pt educated regarding fall precautions. Bed alarm in use. Pt denies any additional needs at this time. Call light within reach.

## 2019-12-30 RX ORDER — OXYBUTYNIN CHLORIDE 5 MG/1
TABLET ORAL
Qty: 120 TAB | Refills: 0 | Status: SHIPPED | OUTPATIENT
Start: 2019-12-30

## 2019-12-30 NOTE — TELEPHONE ENCOUNTER
Was the patient seen in the last year in this department? No  lov 12/20/17    Does patient have an active prescription for medications requested? No     Received Request Via: Pharmacy

## 2019-12-31 NOTE — TELEPHONE ENCOUNTER
Requested Prescriptions     Signed Prescriptions Disp Refills   • oxybutynin (DITROPAN) 5 MG Tab 120 Tab 0     Sig: TAKE 1 TABLET BY MOUTH 4 TIMES DAILY AS NEEDED.     Authorizing Provider: SIERRA NIXON A.P.R.N.

## 2020-01-05 ENCOUNTER — HOSPITAL ENCOUNTER (OUTPATIENT)
Dept: HOSPITAL 8 - ED | Age: 85
Setting detail: OBSERVATION
LOS: 2 days | Discharge: HOME | End: 2020-01-07
Attending: HOSPITALIST | Admitting: HOSPITALIST
Payer: MEDICARE

## 2020-01-05 VITALS — WEIGHT: 108.03 LBS | BODY MASS INDEX: 18.44 KG/M2 | HEIGHT: 64 IN

## 2020-01-05 DIAGNOSIS — G45.9: Primary | ICD-10-CM

## 2020-01-05 DIAGNOSIS — I63.9: ICD-10-CM

## 2020-01-05 DIAGNOSIS — E87.1: ICD-10-CM

## 2020-01-05 DIAGNOSIS — I10: ICD-10-CM

## 2020-01-05 DIAGNOSIS — Z23: ICD-10-CM

## 2020-01-05 DIAGNOSIS — G47.00: ICD-10-CM

## 2020-01-05 DIAGNOSIS — R41.82: ICD-10-CM

## 2020-01-05 DIAGNOSIS — Z66: ICD-10-CM

## 2020-01-05 DIAGNOSIS — Z85.43: ICD-10-CM

## 2020-01-05 DIAGNOSIS — R79.89: ICD-10-CM

## 2020-01-05 LAB
ALBUMIN SERPL-MCNC: 3.5 G/DL (ref 3.4–5)
ANION GAP SERPL CALC-SCNC: 5 MMOL/L (ref 5–15)
BASOPHILS # BLD AUTO: 0.01 X10^3/UL (ref 0–0.1)
BASOPHILS NFR BLD AUTO: 0 % (ref 0–1)
CALCIUM SERPL-MCNC: 9.5 MG/DL (ref 8.5–10.1)
CHLORIDE SERPL-SCNC: 102 MMOL/L (ref 98–107)
CREAT SERPL-MCNC: 0.74 MG/DL (ref 0.55–1.02)
EOSINOPHIL # BLD AUTO: 0.08 X10^3/UL (ref 0–0.4)
EOSINOPHIL NFR BLD AUTO: 2 % (ref 1–7)
ERYTHROCYTE [DISTWIDTH] IN BLOOD BY AUTOMATED COUNT: 12.4 % (ref 9.6–15.2)
LYMPHOCYTES # BLD AUTO: 1.06 X10^3/UL (ref 1–3.4)
LYMPHOCYTES NFR BLD AUTO: 22 % (ref 22–44)
MCH RBC QN AUTO: 34 PG (ref 27–34.8)
MCHC RBC AUTO-ENTMCNC: 33.3 G/DL (ref 32.4–35.8)
MCV RBC AUTO: 102.2 FL (ref 80–100)
MD: NO
MONOCYTES # BLD AUTO: 0.34 X10^3/UL (ref 0.2–0.8)
MONOCYTES NFR BLD AUTO: 7 % (ref 2–9)
NEUTROPHILS # BLD AUTO: 3.28 X10^3/UL (ref 1.8–6.8)
NEUTROPHILS NFR BLD AUTO: 69 % (ref 42–75)
PLATELET # BLD AUTO: 191 X10^3/UL (ref 130–400)
PMV BLD AUTO: 8 FL (ref 7.4–10.4)
RBC # BLD AUTO: 4.15 X10^6/UL (ref 3.82–5.3)

## 2020-01-05 PROCEDURE — 90686 IIV4 VACC NO PRSV 0.5 ML IM: CPT

## 2020-01-05 PROCEDURE — 96361 HYDRATE IV INFUSION ADD-ON: CPT

## 2020-01-05 PROCEDURE — G0378 HOSPITAL OBSERVATION PER HR: HCPCS

## 2020-01-05 PROCEDURE — 92523 SPEECH SOUND LANG COMPREHEN: CPT

## 2020-01-05 PROCEDURE — 93880 EXTRACRANIAL BILAT STUDY: CPT

## 2020-01-05 PROCEDURE — 97166 OT EVAL MOD COMPLEX 45 MIN: CPT

## 2020-01-05 PROCEDURE — 93306 TTE W/DOPPLER COMPLETE: CPT

## 2020-01-05 PROCEDURE — 70551 MRI BRAIN STEM W/O DYE: CPT

## 2020-01-05 PROCEDURE — 36415 COLL VENOUS BLD VENIPUNCTURE: CPT

## 2020-01-05 PROCEDURE — 97162 PT EVAL MOD COMPLEX 30 MIN: CPT

## 2020-01-05 PROCEDURE — 90471 IMMUNIZATION ADMIN: CPT

## 2020-01-05 PROCEDURE — 82040 ASSAY OF SERUM ALBUMIN: CPT

## 2020-01-05 PROCEDURE — 96372 THER/PROPH/DIAG INJ SC/IM: CPT

## 2020-01-05 PROCEDURE — 99284 EMERGENCY DEPT VISIT MOD MDM: CPT

## 2020-01-05 PROCEDURE — 80048 BASIC METABOLIC PNL TOTAL CA: CPT

## 2020-01-05 PROCEDURE — 99285 EMERGENCY DEPT VISIT HI MDM: CPT

## 2020-01-05 PROCEDURE — 96360 HYDRATION IV INFUSION INIT: CPT

## 2020-01-05 PROCEDURE — 70450 CT HEAD/BRAIN W/O DYE: CPT

## 2020-01-05 PROCEDURE — 93005 ELECTROCARDIOGRAM TRACING: CPT

## 2020-01-05 PROCEDURE — 85025 COMPLETE CBC W/AUTO DIFF WBC: CPT

## 2020-01-05 PROCEDURE — 80061 LIPID PANEL: CPT

## 2020-01-05 RX ADMIN — SODIUM CHLORIDE SCH MLS/HR: 0.9 INJECTION, SOLUTION INTRAVENOUS at 19:00

## 2020-01-05 RX ADMIN — ATORVASTATIN CALCIUM SCH MG: 80 TABLET, FILM COATED ORAL at 21:00

## 2020-01-05 RX ADMIN — HEPARIN SODIUM SCH UNITS: 5000 INJECTION, SOLUTION INTRAVENOUS; SUBCUTANEOUS at 18:22

## 2020-01-05 NOTE — NUR
back in room from mri. will update brother. vss. no complaints at this time. 
awaiting bed. as

CT: 6 mm chronic lacunar infarct in the right midbrain

Diffuse atrophy.  Moderate chronic white matter small vessel ischemic changes.

Small calcified meningioma along the right posterior temporoparietal convexity

without mass effect.

## 2020-01-05 NOTE — NUR
1445 PT BIBA TO ED FROM [facility]. AT 0500 NOTED THAT SHE HAD "CLOUDY 
VISION" AT 1045 NEIGHBOR CALLED 911 BC SHE BACKED OUT OF GARAGE INTO A DITCH. 
1100 EMS NOTED SLIGHT L SIDED WEAKNESS AND L SIDED FACIAL DROOP. AT [facility] NOTED K3.2 . NO MEDS GIVEN ASIDE FROM 50 CC NS. ON ARRIVAL TO THIS 
ED PT A&OX4 GCS15. 4/5 STRENGTH L SIDE 5/5 R SIDE. STS THIS IS NORMAL FOR HER 
D/T OLD STROKE. [facility] CT SHOWS ?OLD MENINGIOMA. +SENSATION, SLIGHT L FACIAL 
DROOP, PEARRL. NSR ON MONITOR. VSS. ERMD IN ROOM FOR EVAL. PLAN FOR ADMIT. 
LABS. 

1500 PASSED SWALLOW EVAL. GIVEN APAP PER MAR FOR HA. COUGHED AFTER. STS HAS 
"CHOKING TROUBLE" AT HOME ERMD NOTIFIED PT NPO PLAN FOR SPEECH.AS

## 2020-01-06 VITALS — DIASTOLIC BLOOD PRESSURE: 75 MMHG | SYSTOLIC BLOOD PRESSURE: 136 MMHG

## 2020-01-06 VITALS — SYSTOLIC BLOOD PRESSURE: 122 MMHG | DIASTOLIC BLOOD PRESSURE: 65 MMHG

## 2020-01-06 VITALS — SYSTOLIC BLOOD PRESSURE: 125 MMHG | DIASTOLIC BLOOD PRESSURE: 74 MMHG

## 2020-01-06 LAB
ANION GAP SERPL CALC-SCNC: 4 MMOL/L (ref 5–15)
BASOPHILS # BLD AUTO: 0.01 X10^3/UL (ref 0–0.1)
BASOPHILS NFR BLD AUTO: 0 % (ref 0–1)
CALCIUM SERPL-MCNC: 8.9 MG/DL (ref 8.5–10.1)
CHLORIDE SERPL-SCNC: 106 MMOL/L (ref 98–107)
CHOL/HDL RATIO: 1.5
CREAT SERPL-MCNC: 0.74 MG/DL (ref 0.55–1.02)
EOSINOPHIL # BLD AUTO: 0.11 X10^3/UL (ref 0–0.4)
EOSINOPHIL NFR BLD AUTO: 3 % (ref 1–7)
ERYTHROCYTE [DISTWIDTH] IN BLOOD BY AUTOMATED COUNT: 12.3 % (ref 9.6–15.2)
HDL CHOL %: 66 % (ref 28–40)
HDL CHOLESTEROL (DIRECT): 103 MG/DL (ref 40–60)
LDL CHOLESTEROL,CALCULATED: 43 MG/DL (ref 54–169)
LDLC/HDLC SERPL: 0.4 {RATIO} (ref 0.5–3)
LYMPHOCYTES # BLD AUTO: 0.89 X10^3/UL (ref 1–3.4)
LYMPHOCYTES NFR BLD AUTO: 22 % (ref 22–44)
MCH RBC QN AUTO: 34.2 PG (ref 27–34.8)
MCHC RBC AUTO-ENTMCNC: 33.4 G/DL (ref 32.4–35.8)
MCV RBC AUTO: 102.3 FL (ref 80–100)
MD: NO
MONOCYTES # BLD AUTO: 0.34 X10^3/UL (ref 0.2–0.8)
MONOCYTES NFR BLD AUTO: 9 % (ref 2–9)
NEUTROPHILS # BLD AUTO: 2.64 X10^3/UL (ref 1.8–6.8)
NEUTROPHILS NFR BLD AUTO: 66 % (ref 42–75)
PLATELET # BLD AUTO: 158 X10^3/UL (ref 130–400)
PMV BLD AUTO: 8.2 FL (ref 7.4–10.4)
RBC # BLD AUTO: 3.84 X10^6/UL (ref 3.82–5.3)
TRIGL SERPL-MCNC: 56 MG/DL (ref 50–200)
VLDLC SERPL CALC-MCNC: 11 MG/DL (ref 0–25)

## 2020-01-06 RX ADMIN — ATORVASTATIN CALCIUM SCH MG: 80 TABLET, FILM COATED ORAL at 21:30

## 2020-01-06 RX ADMIN — HEPARIN SODIUM SCH UNITS: 5000 INJECTION, SOLUTION INTRAVENOUS; SUBCUTANEOUS at 15:44

## 2020-01-06 RX ADMIN — ASPIRIN SCH MG: 325 TABLET, FILM COATED ORAL at 09:00

## 2020-01-06 RX ADMIN — HEPARIN SODIUM SCH UNITS: 5000 INJECTION, SOLUTION INTRAVENOUS; SUBCUTANEOUS at 03:00

## 2020-01-06 RX ADMIN — SODIUM CHLORIDE SCH MLS/HR: 0.9 INJECTION, SOLUTION INTRAVENOUS at 21:30

## 2020-01-06 NOTE — NUR
PT CONTINUES TO REST ON HOSP BED. CALLED HOSPITALIST FOR DIET ORDER. NADN. NO 
NEEDS REQUESTED AT THIS TIME.

## 2020-01-06 NOTE — NUR
PT BEING TRANSFERRED TO FLOOR. PT LEFT WITH ALL PERSONAL BELONGINGS. PT HAS 
RIGHT HEARING AID IN HER EAR AT TIME OF TRANSPORT TO FLOOR.

## 2020-01-06 NOTE — NUR
PT ASSISTED TO BATHROOM. PT GIVEN NEW BRIEFS AND PAD, PER PT REQUEST. MARGARITA NORRIS. 
ULTRASOUND BEDSIDE FOR PROCEDURES. NO OTHER NEEDS REQUESTED AT THIS TIME

## 2020-01-07 VITALS — DIASTOLIC BLOOD PRESSURE: 63 MMHG | SYSTOLIC BLOOD PRESSURE: 109 MMHG

## 2020-01-07 VITALS — DIASTOLIC BLOOD PRESSURE: 80 MMHG | SYSTOLIC BLOOD PRESSURE: 163 MMHG

## 2020-01-07 VITALS — DIASTOLIC BLOOD PRESSURE: 76 MMHG | SYSTOLIC BLOOD PRESSURE: 150 MMHG

## 2020-01-07 RX ADMIN — HEPARIN SODIUM SCH UNITS: 5000 INJECTION, SOLUTION INTRAVENOUS; SUBCUTANEOUS at 04:25

## 2020-01-07 RX ADMIN — HEPARIN SODIUM SCH UNITS: 5000 INJECTION, SOLUTION INTRAVENOUS; SUBCUTANEOUS at 16:05

## 2020-01-07 RX ADMIN — ASPIRIN SCH MG: 325 TABLET, FILM COATED ORAL at 09:05

## 2024-11-04 NOTE — PROGRESS NOTES
Pre-charting done   Pt A&Ox4, pt declines pain, N/V, and N/T. Pt experiencing loose stools, holding stool softeners. Bed alarm is on, call light and personal belongings within reach.

## 2025-03-20 NOTE — ASSESSMENT & PLAN NOTE
"Dr Mabry, GI was consulted by emergency room physician.  Patient recently had colonoscopy 8 months ago per her report which was unremarkable with no polyps.  Patient denies any recent constipation states she has had some loose \"applesauce\" like bowel movements recently normal brown  Episode of diarrhea this morning bright red uncontrolled.  No abdominal pain  Follow hemoglobin and hematocrit and longer vitals  Question of diverticulosis versus internal hemorrhoids  Start Anusol  Hold PPI  Hold Plavix for now  "
2017  Hold Plavix    
Confirmed 8/16 in ER w/ patient.  DNR/DNI  
Continue ditropan  Normally wears a pad  
Continue triamterene/HCTZ  Patient states she does not take propranolol any longer for her essential tremor or blood pressure.  Monitor vitals  
Previously followed by Dr. Vee  Patient reports ovarian cancer diagnosed 2008  
Will have bedside RN evaluate for any risk of falling  
verbal instruction/written material